# Patient Record
Sex: FEMALE | Race: WHITE | NOT HISPANIC OR LATINO | Employment: OTHER | URBAN - METROPOLITAN AREA
[De-identification: names, ages, dates, MRNs, and addresses within clinical notes are randomized per-mention and may not be internally consistent; named-entity substitution may affect disease eponyms.]

---

## 2017-03-15 DIAGNOSIS — R63.5 ABNORMAL WEIGHT GAIN: ICD-10-CM

## 2017-03-15 DIAGNOSIS — R73.02 IMPAIRED GLUCOSE TOLERANCE: ICD-10-CM

## 2017-03-15 DIAGNOSIS — Z12.31 ENCOUNTER FOR SCREENING MAMMOGRAM FOR MALIGNANT NEOPLASM OF BREAST: ICD-10-CM

## 2017-03-17 ENCOUNTER — ALLSCRIPTS OFFICE VISIT (OUTPATIENT)
Dept: OTHER | Facility: OTHER | Age: 62
End: 2017-03-17

## 2017-04-10 ENCOUNTER — ALLSCRIPTS OFFICE VISIT (OUTPATIENT)
Dept: OTHER | Facility: OTHER | Age: 62
End: 2017-04-10

## 2017-12-07 ENCOUNTER — ALLSCRIPTS OFFICE VISIT (OUTPATIENT)
Dept: OTHER | Facility: OTHER | Age: 62
End: 2017-12-07

## 2017-12-08 NOTE — PROGRESS NOTES
Assessment    1  Former smoker (V15 82) (Q26 777)   2  Benign paroxysmal vertigo, unspecified laterality (386 11) (H81 10)   3  Benign essential hypertension (401 1) (I10)    Plan  Benign essential hypertension    · HydroCHLOROthiazide 12 5 MG Oral Tablet; TAKE 1 TABLET DAILY   · Lisinopril 5 MG Oral Tablet; TAKE 1 TABLET BY MOUTH ONCE DAILY   · Meclizine HCl - 25 MG Oral Tablet; TAKE 1 TABLET 3 times daily   · EKG/ECG- POC; Status:Active - Perform Order; Requested for:[id number];   Need for influenza vaccination    · Stop: Fluzone Quadrivalent 0 5 ML Intramuscular Suspension PrefilledSyringe    Discussion/Summary    Patient has considerable stress due to family diagreements BP up due to stress  Chief Complaint  Two weeks ago patient had dental procedures done which caused considerable pain she has now reached a point where she no longer has dental pain but she has been feeling a little bit dizzy and a little bit nauseous for the past 48 hours today when she got up out of bed she felt extremely vertiginous she also had nausea and she had some vague chest discomfort in the subxiphoid area of her chest no dyspnea patient also is acutely vertiginous here in the office when she changed positions after having her cardiogram done she became vertiginous      History of Present Illness  HPI: see chief complaint      Review of Systems   Constitutional: feeling poorly-- and-- feeling tired, but-- no fever-- and-- no chills  ENT: severe vertigo  Cardiovascular: no complaints of slow or fast heart rate, no chest pain, no palpitations, no leg claudication or lower extremity edema  Respiratory: no complaints of shortness of breath, no wheezing, no dyspnea on exertion, no orthopnea or PND  Breasts: no complaints of breast pain, breast lump or nipple discharge  Gastrointestinal: no complaints of abdominal pain, no constipation, no nausea or diarrhea, no vomiting, no bloody stools    Genitourinary: no complaints of dysuria, no incontinence, no pelvic pain, no dysmenorrhea, no vaginal discharge or abnormal vaginal bleeding  Musculoskeletal: no complaints of arthralgia, no myalgia, no joint swelling or stiffness, no limb pain or swelling  Integumentary: no complaints of skin rash or lesion, no itching or dry skin, no skin wounds  Neurological: dizziness  ROS reviewed  Active Problems    1  Abnormal weight gain (783 1) (R63 5)   2  Bell's palsy (351 0) (G51 0)   3  Benign paroxysmal vertigo, unspecified laterality (386 11) (H81 10)   4  Encounter for screening mammogram for breast cancer (V76 12) (Z12 31)   5  Gastroesophageal reflux disease, esophagitis presence not specified (530 81) (K21 9)   6  Glucose intolerance (impaired glucose tolerance) (790 22) (R73 02)   7  Need for influenza vaccination (V04 81) (Z23)   8  Screening for colorectal cancer (V76 51) (Z12 11,Z12 12)   9  Screening for depression (V79 0) (Z13 89)    Past Medical History  1  History of Chronic maxillary sinusitis (473 0) (J32 0)   2  History of Chronic pansinusitis (473 8) (J32 4)   3  History of Community acquired pneumonia (5) (J18 9)   4  History of Exposure To Epidemic Keratoconjunctivitis (V01 79)   5  History of common cold (V12 09) (Z86 19)   6  History of common cold (V12 09) (Z86 19)   7  History of urinary tract infection (V13 02) (Z87 440)  Active Problems And Past Medical History Reviewed: The active problems and past medical history were reviewed and updated today  Family History  Mother    1  Family history of Heart disease (429 9) (I51 9)   2  Family history of Thyroid disorder (246 9) (E07 9)  Family History Reviewed: The family history was reviewed and updated today  Social History   · Former smoker (B03 56) (I22 813)   · Social drinker  The social history was reviewed and updated today  The social history was reviewed and is unchanged  Surgical History    1  History of Gallbladder Surgery   2   History of Laparoscopy (Diagnostic)   3  History of Tonsillectomy   4  History of Uterine Surgery  Surgical History Reviewed: The surgical history was reviewed and updated today  Current Meds   1  Chlorophyll TABS; TAKE 2 TABLET  with each meal as needed; Therapy: (Recorded:17Mar2017) to Recorded    The medication list was reviewed and updated today  Allergies  1  Cipro TABS   2  TraMADol HCl TABS    Vitals   Recorded: 59STC9794 52:68CA   Systolic 020   Diastolic 139   Height 5 ft 2 in   Weight 185 lb 3 2 oz   BMI Calculated 33 87   BSA Calculated 1 85       Physical Exam   Constitutional  General appearance: No acute distress, well appearing and well nourished  Eyes  Conjunctiva and lids: No swelling, erythema or discharge  Pupils and irises: Equal, round and reactive to light  Ears, Nose, Mouth, and Throat  External inspection of ears and nose: Normal    Otoscopic examination: Tympanic membranes translucent with normal light reflex  Canals patent without erythema  Nasal mucosa, septum, and turbinates: Normal without edema or erythema  Oropharynx: Normal with no erythema, edema, exudate or lesions  Pulmonary  Respiratory effort: No increased work of breathing or signs of respiratory distress  Auscultation of lungs: Clear to auscultation  Cardiovascular  Palpation of heart: Normal PMI, no thrills  Auscultation of heart: Normal rate and rhythm, normal S1 and S2, without murmurs  Examination of extremities for edema and/or varicosities: Normal    Carotid pulses: Normal    Abdomen  Abdomen: Non-tender, no masses  Liver and spleen: No hepatomegaly or splenomegaly  Lymphatic  Palpation of lymph nodes in neck: No lymphadenopathy  Musculoskeletal  Gait and station: Normal    Digits and nails: Normal without clubbing or cyanosis  Inspection/palpation of joints, bones, and muscles: Normal    Skin  Skin and subcutaneous tissue: Normal without rashes or lesions     Neurologic  Cranial nerves: Cranial nerves 2-12 intact  Reflexes: 2+ and symmetric  Sensation: No sensory loss     Psychiatric  Orientation to person, place, and time: Normal    Mood and affect: Normal          Signatures   Electronically signed by : Supriya Anderson DO; Dec  7 2017  1:56PM EST                       (Author)

## 2017-12-09 ENCOUNTER — HOSPITAL ENCOUNTER (EMERGENCY)
Facility: HOSPITAL | Age: 62
Discharge: HOME | End: 2017-12-09
Attending: EMERGENCY MEDICINE

## 2017-12-09 ENCOUNTER — APPOINTMENT (EMERGENCY)
Dept: RADIOLOGY | Facility: HOSPITAL | Age: 62
End: 2017-12-09

## 2017-12-09 VITALS
OXYGEN SATURATION: 98 % | HEART RATE: 82 BPM | RESPIRATION RATE: 18 BRPM | SYSTOLIC BLOOD PRESSURE: 124 MMHG | WEIGHT: 180.5 LBS | DIASTOLIC BLOOD PRESSURE: 78 MMHG | TEMPERATURE: 98 F | BODY MASS INDEX: 33.01 KG/M2

## 2017-12-09 DIAGNOSIS — I10 HYPERTENSION COMPLICATIONS: Primary | ICD-10-CM

## 2017-12-09 DIAGNOSIS — R42 EPISODIC LIGHTHEADEDNESS: ICD-10-CM

## 2017-12-09 LAB
ALBUMIN SERPL BCP-MCNC: 4 G/DL (ref 3.5–5)
ALP SERPL-CCNC: 96 U/L (ref 46–116)
ALT SERPL W P-5'-P-CCNC: 58 U/L (ref 12–78)
ANION GAP SERPL CALCULATED.3IONS-SCNC: 10 MMOL/L (ref 4–13)
AST SERPL W P-5'-P-CCNC: 29 U/L (ref 5–45)
BASOPHILS # BLD AUTO: 0.08 THOUSANDS/ΜL (ref 0–0.1)
BASOPHILS NFR BLD AUTO: 1 % (ref 0–1)
BILIRUB SERPL-MCNC: 0.4 MG/DL (ref 0.2–1)
BUN SERPL-MCNC: 20 MG/DL (ref 5–25)
CALCIUM SERPL-MCNC: 9.5 MG/DL (ref 8.3–10.1)
CHLORIDE SERPL-SCNC: 103 MMOL/L (ref 100–108)
CO2 SERPL-SCNC: 28 MMOL/L (ref 21–32)
CREAT SERPL-MCNC: 0.82 MG/DL (ref 0.6–1.3)
DEPRECATED D DIMER PPP: <270 NG/ML (FEU) (ref 0–424)
EOSINOPHIL # BLD AUTO: 0.3 THOUSAND/ΜL (ref 0–0.61)
EOSINOPHIL NFR BLD AUTO: 3 % (ref 0–6)
ERYTHROCYTE [DISTWIDTH] IN BLOOD BY AUTOMATED COUNT: 11.8 % (ref 11.6–15.1)
GFR SERPL CREATININE-BSD FRML MDRD: 77 ML/MIN/1.73SQ M
GLUCOSE SERPL-MCNC: 118 MG/DL (ref 65–140)
HCT VFR BLD AUTO: 49.7 % (ref 34.8–46.1)
HGB BLD-MCNC: 17 G/DL (ref 11.5–15.4)
LYMPHOCYTES # BLD AUTO: 3.04 THOUSANDS/ΜL (ref 0.6–4.47)
LYMPHOCYTES NFR BLD AUTO: 33 % (ref 14–44)
MCH RBC QN AUTO: 31.1 PG (ref 26.8–34.3)
MCHC RBC AUTO-ENTMCNC: 34.2 G/DL (ref 31.4–37.4)
MCV RBC AUTO: 91 FL (ref 82–98)
MONOCYTES # BLD AUTO: 0.99 THOUSAND/ΜL (ref 0.17–1.22)
MONOCYTES NFR BLD AUTO: 11 % (ref 4–12)
NEUTROPHILS # BLD AUTO: 4.71 THOUSANDS/ΜL (ref 1.85–7.62)
NEUTS SEG NFR BLD AUTO: 52 % (ref 43–75)
PLATELET # BLD AUTO: 321 THOUSANDS/UL (ref 149–390)
PMV BLD AUTO: 10.3 FL (ref 8.9–12.7)
POTASSIUM SERPL-SCNC: 4.4 MMOL/L (ref 3.5–5.3)
PROT SERPL-MCNC: 8.6 G/DL (ref 6.4–8.2)
RBC # BLD AUTO: 5.47 MILLION/UL (ref 3.81–5.12)
SODIUM SERPL-SCNC: 141 MMOL/L (ref 136–145)
TROPONIN I SERPL-MCNC: <0.02 NG/ML
WBC # BLD AUTO: 9.12 THOUSAND/UL (ref 4.31–10.16)

## 2017-12-09 PROCEDURE — 93005 ELECTROCARDIOGRAM TRACING: CPT | Performed by: EMERGENCY MEDICINE

## 2017-12-09 PROCEDURE — 85025 COMPLETE CBC W/AUTO DIFF WBC: CPT | Performed by: EMERGENCY MEDICINE

## 2017-12-09 PROCEDURE — 80053 COMPREHEN METABOLIC PANEL: CPT | Performed by: EMERGENCY MEDICINE

## 2017-12-09 PROCEDURE — 85379 FIBRIN DEGRADATION QUANT: CPT | Performed by: EMERGENCY MEDICINE

## 2017-12-09 PROCEDURE — 71020 HB CHEST X-RAY 2VW FRONTAL&LATL: CPT

## 2017-12-09 PROCEDURE — 99285 EMERGENCY DEPT VISIT HI MDM: CPT

## 2017-12-09 PROCEDURE — 93005 ELECTROCARDIOGRAM TRACING: CPT

## 2017-12-09 PROCEDURE — 36415 COLL VENOUS BLD VENIPUNCTURE: CPT | Performed by: EMERGENCY MEDICINE

## 2017-12-09 PROCEDURE — 84484 ASSAY OF TROPONIN QUANT: CPT | Performed by: EMERGENCY MEDICINE

## 2017-12-09 RX ORDER — ASPIRIN 81 MG/1
162 TABLET, CHEWABLE ORAL ONCE
Status: COMPLETED | OUTPATIENT
Start: 2017-12-09 | End: 2017-12-09

## 2017-12-09 RX ORDER — 0.9 % SODIUM CHLORIDE 0.9 %
3 VIAL (ML) INJECTION AS NEEDED
Status: DISCONTINUED | OUTPATIENT
Start: 2017-12-09 | End: 2017-12-09 | Stop reason: HOSPADM

## 2017-12-09 RX ORDER — HYDROCHLOROTHIAZIDE 12.5 MG/1
12.5 CAPSULE, GELATIN COATED ORAL DAILY
COMMUNITY
End: 2018-06-18 | Stop reason: SDUPTHER

## 2017-12-09 RX ADMIN — ASPIRIN 81 MG CHEWABLE TABLET 162 MG: 81 TABLET CHEWABLE at 12:24

## 2017-12-09 NOTE — ED PROCEDURE NOTE
PROCEDURE  ECG 12 Lead Documentation  Date/Time: 12/9/2017 12:07 PM  Performed by: Adriane Aguayo  Authorized by: Adriane Aguayo     Indications / Diagnosis:  Chest pain  ECG reviewed by me, the ED Provider: yes    Patient location:  ED  Interpretation:     Interpretation: non-specific    Quality:     Tracing quality:  Limited by artifact  Rate:     ECG rate:  95    ECG rate assessment: normal    Rhythm:     Rhythm: sinus rhythm    Ectopy:     Ectopy: none    Conduction:     Conduction: normal    ST segments:     ST segments:  Non-specific  T waves:     T waves: normal

## 2017-12-09 NOTE — DISCHARGE INSTRUCTIONS

## 2017-12-09 NOTE — ED PROVIDER NOTES
History  Chief Complaint   Patient presents with    Chest Pain     feels chest fluttering today     Patient presents for complaint of lightheadedness, twinges of left-sided chest pain, nausea and episodes of occipital headache occurring for the past week  She saw her PCP 2 days ago and was newly diagnosed with hypertension for which she was given lisinopril and HCTZ  After 1 dose of the lisinopril she became dizzy/lightheaded, had micturition near syncope and was unable to tolerate further dosing  In consultation with her PCP she stopped the lisinopril and continues the HCTZ  She states her blood pressure was 180/101 2 days ago prior to starting medications  Prior to that she indicates that her pressure was always in the 130s over 80s  She states she has a prior h/o vertigo like symptoms but has never taken medication for it  She denies other medical conditions or medications  She denies prior h/o cardiac stress testing  Today she states she got diaphoretic and a fluttering sensation in her chest while shopping and this made her more nervous  In the ED she has no chest pain or associated cardiac symptoms  No recent travel or sick contacts  Denies f/c, HA, LH/dizziness, diaphoresis, SOB, abdominal pain, n/v/d  12 system ROS o/w negative                History provided by:  Medical records and patient  Chest Pain   Pain location:  L chest  Pain quality: sharp    Pain radiates to:  Does not radiate  Pain radiates to the back: no    Pain severity:  Mild  Onset quality:  Sudden  Duration:  3 days  Timing:  Sporadic  Progression:  Waxing and waning  Chronicity:  New  Context: at rest    Context: not breathing, not eating, not lifting, no movement, not raising an arm, no stress and no trauma    Relieved by:  None tried  Worsened by:  Nothing tried  Ineffective treatments:  None tried  Associated symptoms: anxiety, diaphoresis, headache (Occipital), nausea and near-syncope    Associated symptoms: no abdominal pain, no altered mental status, no back pain, no claudication, no cough, no dizziness, no fatigue, no fever, no lower extremity edema, no numbness, no palpitations, no shortness of breath, no syncope, not vomiting and no weakness    Nausea:     Severity:  Mild    Onset quality:  Unable to specify    Timing:  Sporadic    Progression:  Resolved  Risk factors: hypertension and obesity    Risk factors: no coronary artery disease, no diabetes mellitus, no high cholesterol, no immobilization, not male, not pregnant, no prior DVT/PE, no smoking and no surgery        Prior to Admission Medications   Prescriptions Last Dose Informant Patient Reported? Taking?   hydrochlorothiazide (MICROZIDE) 12 5 mg capsule   Yes Yes   Sig: Take 12 5 mg by mouth daily      Facility-Administered Medications: None       Past Medical History:   Diagnosis Date    Bell's palsy     Hypertension     Kidney calculi        Past Surgical History:   Procedure Laterality Date    CHOLECYSTECTOMY      ENDOMETRIAL ABLATION         History reviewed  No pertinent family history  I have reviewed and agree with the history as documented  Social History   Substance Use Topics    Smoking status: Never Smoker    Smokeless tobacco: Never Used    Alcohol use No        Review of Systems   Constitutional: Positive for diaphoresis  Negative for activity change, appetite change, chills, fatigue and fever  HENT: Negative for rhinorrhea and sore throat  Respiratory: Negative for cough, shortness of breath and wheezing  Cardiovascular: Positive for chest pain and near-syncope  Negative for palpitations, claudication, leg swelling and syncope  Gastrointestinal: Positive for nausea  Negative for abdominal distention, abdominal pain, constipation, diarrhea and vomiting  Genitourinary: Negative for difficulty urinating, dysuria, flank pain, frequency, hematuria and urgency  Musculoskeletal: Negative for back pain and neck pain     Skin: Negative for pallor and rash  Neurological: Positive for headaches (Occipital)  Negative for dizziness, syncope, weakness, light-headedness and numbness  Hematological: Negative for adenopathy  Psychiatric/Behavioral: Negative for confusion  The patient is nervous/anxious  All other systems reviewed and are negative  Physical Exam  ED Triage Vitals [12/09/17 1209]   Temperature Pulse Respirations Blood Pressure SpO2   98 °F (36 7 °C) 98 18 (!) 176/123 99 %      Temp Source Heart Rate Source Patient Position - Orthostatic VS BP Location FiO2 (%)   Temporal Monitor Lying Left arm --      Pain Score       No Pain           Orthostatic Vital Signs  Vitals:    12/09/17 1209 12/09/17 1226 12/09/17 1245   BP: (!) 176/123 148/86 131/80   Pulse: 98 92 87   Patient Position - Orthostatic VS: Lying Sitting Sitting       Physical Exam   Constitutional: She is oriented to person, place, and time  She appears well-developed and well-nourished  No distress  HENT:   Head: Normocephalic  Right Ear: External ear normal    Left Ear: External ear normal    Mouth/Throat: Oropharynx is clear and moist    Eyes: Conjunctivae and EOM are normal  Pupils are equal, round, and reactive to light  Normal funduscopic exam   Neck: Normal range of motion  Neck supple  Cardiovascular: Normal rate and regular rhythm  No murmur heard  Pulmonary/Chest: Effort normal and breath sounds normal    Abdominal: Soft  Bowel sounds are normal  She exhibits no distension  There is no tenderness  Obese   Musculoskeletal: Normal range of motion  She exhibits no edema or tenderness  Lymphadenopathy:     She has no cervical adenopathy  Neurological: She is alert and oriented to person, place, and time  She has normal reflexes  No cranial nerve deficit or sensory deficit  She exhibits normal muscle tone  Skin: Skin is warm and dry  Capillary refill takes less than 2 seconds  No rash noted  She is not diaphoretic  No erythema  No pallor  Psychiatric: Her behavior is normal  Thought content normal    Anxious   Vitals reviewed  ED Medications  Medications   sodium chloride (PF) 0 9 % injection 3 mL (not administered)   aspirin chewable tablet 162 mg (162 mg Oral Given 12/9/17 1224)       Diagnostic Studies  Results Reviewed     Procedure Component Value Units Date/Time    D-dimer, quantitative [[id number]]  (Normal) Resulted:  12/09/17 1256    Lab Status:  Final result Specimen:  Blood Updated:  12/09/17 1256     D-Dimer, Quant <270 ng/ml (FEU)     Troponin I [[id number]]  (Normal) Collected:  12/09/17 1210    Lab Status:  Final result Specimen:  Blood from Arm, Right Updated:  12/09/17 1243     Troponin I <0 02 ng/mL     Narrative:         [employer] Chemistry analyzer 99% cutoff is > 0 04 ng/mL in network labs    o cTnI 99% cutoff is useful only when applied to patients in the clinical setting of myocardial ischemia  o cTnI 99% cutoff should be interpreted in the context of clinical history, ECG findings and possibly cardiac imaging to establish correct diagnosis  o cTnI 99% cutoff may be suggestive but clearly not indicative of a coronary event without the clinical setting of myocardial ischemia      Comprehensive metabolic panel [[id number]]  (Abnormal) Collected:  12/09/17 1210    Lab Status:  Final result Specimen:  Blood from Arm, Right Updated:  12/09/17 1238     Sodium 141 mmol/L      Potassium 4 4 mmol/L      Chloride 103 mmol/L      CO2 28 mmol/L      Anion Gap 10 mmol/L      BUN 20 mg/dL      Creatinine 0 82 mg/dL      Glucose 118 mg/dL      Calcium 9 5 mg/dL      AST 29 U/L      ALT 58 U/L      Alkaline Phosphatase 96 U/L      Total Protein 8 6 (H) g/dL      Albumin 4 0 g/dL      Total Bilirubin 0 40 mg/dL      eGFR 77 ml/min/1 73sq m     Narrative:         National Kidney Disease Education Program recommendations are as follows:  GFR calculation is accurate only with a steady state creatinine  Chronic Kidney disease less than 60 ml/min/1 73 sq  meters  Kidney failure less than 15 ml/min/1 73 sq  meters  CBC and differential [[id number]]  (Abnormal) Collected:  12/09/17 1210    Lab Status:  Final result Specimen:  Blood from Arm, Right Updated:  12/09/17 1224     WBC 9 12 Thousand/uL      RBC 5 47 (H) Million/uL      Hemoglobin 17 0 (H) g/dL      Hematocrit 49 7 (H) %      MCV 91 fL      MCH 31 1 pg      MCHC 34 2 g/dL      RDW 11 8 %      MPV 10 3 fL      Platelets 039 Thousands/uL      Neutrophils Relative 52 %      Lymphocytes Relative 33 %      Monocytes Relative 11 %      Eosinophils Relative 3 %      Basophils Relative 1 %      Neutrophils Absolute 4 71 Thousands/µL      Lymphocytes Absolute 3 04 Thousands/µL      Monocytes Absolute 0 99 Thousand/µL      Eosinophils Absolute 0 30 Thousand/µL      Basophils Absolute 0 08 Thousands/µL                  X-ray chest 2 views   ED Interpretation by Ronnald Collet, DO (12/09 1241)   No acute cardiopulmonary pathology      Final Result by Claudine Pacheco DO (12/09 1302)      No active pulmonary disease  Workstation performed: [id number]                    Procedures  Procedures       Phone Contacts  ED Phone Contact    ED Course  ED Course as of Dec 09 1305   Sat Dec 09, 2017   1241 Blood pressure much improved spontaneously with decreased anxiety  1302 Results reviewed with patient  Feels better  Recommend continued treatment at home and follow up with PCP as scheduled in 4 days            HEART Risk Score    Flowsheet Row Most Recent Value   History  0 Filed at: 12/09/2017 1219   ECG  1 Filed at: 12/09/2017 1219   Age  1 Filed at: 12/09/2017 1219   Risk Factors  1 Filed at: 12/09/2017 1219   Troponin  0 Filed at: 12/09/2017 1219   Heart Score Risk Calculator   History  0 Filed at: 12/09/2017 1219   ECG  1 Filed at: 12/09/2017 1219   Age  1 Filed at: 12/09/2017 1219   Risk Factors  1 Filed at: 12/09/2017 1219   Troponin  0 Filed at: 12/09/2017 1219   HEART Score  3 Filed at: 12/09/2017 1219   HEART Score  3 Filed at: 12/09/2017 1219                            [facility]  Number of Diagnoses or Management Options  Diagnosis management comments: DDx: Chest pain - uncontrolled hypertension, GERD, less likely ACS/MI, doubt pneumonia, pneumothorax, PE  A/P: Will check cardiac w/u, treat symptoms, reevaluate for disposition  Amount and/or Complexity of Data Reviewed  Clinical lab tests: reviewed and ordered  Tests in the radiology section of CPT®: ordered and reviewed  Obtain history from someone other than the patient: yes ()  Review and summarize past medical records: yes      CritCare Time    Disposition  Final diagnoses:   Hypertension complications   Episodic lightheadedness     Time reflects when diagnosis was documented in both MDM as applicable and the Disposition within this note     Time User Action Codes Description Comment    12/9/2017  1:03 PM [address]  [address] Hypertension complications     01/4/2288  1:04 PM [address],[place]  [address], [address] [R42] Intermittent lightheadedness     12/9/2017  1:04 PM Mavis Carrillo Modify [R42] Intermittent lightheadedness     12/9/2017  1:04 PM [address],[place]  [address], Jordin Remove [I10] Hypertension complications     66/4/2205  1:04 PM Mavis Gerardo Remove [R42] Intermittent lightheadedness     12/9/2017  1:04 PM [address],[place]  [address], [facility] Hypertension complications     32/7/3998  1:04 PM Mavis Gerardo Add [R42] Episodic lightheadedness       ED Disposition     ED Disposition Condition Comment    Discharge  [facility] discharge to home/self care  Condition at discharge: Stable        Follow-up Information     Follow up With Specialties Details Why Contact Claire Hernandez DO Family Medicine Go on 12/14/2017 As scheduled [address]  [phone]          Patient's Medications   Discharge Prescriptions    No medications on file     No discharge procedures on file      ED Provider  Electronically Signed by           Santana Rodriguez DO  12/09/17 1305

## 2017-12-11 LAB
ATRIAL RATE: 95 BPM
P AXIS: 40 DEGREES
PR INTERVAL: 158 MS
QRS AXIS: 29 DEGREES
QRSD INTERVAL: 78 MS
QT INTERVAL: 338 MS
QTC INTERVAL: 424 MS
T WAVE AXIS: 22 DEGREES
VENTRICULAR RATE: 95 BPM

## 2017-12-14 ENCOUNTER — GENERIC CONVERSION - ENCOUNTER (OUTPATIENT)
Dept: OTHER | Facility: OTHER | Age: 62
End: 2017-12-14

## 2018-01-12 VITALS
SYSTOLIC BLOOD PRESSURE: 138 MMHG | WEIGHT: 184.4 LBS | BODY MASS INDEX: 33.93 KG/M2 | HEIGHT: 62 IN | DIASTOLIC BLOOD PRESSURE: 92 MMHG

## 2018-01-12 VITALS
BODY MASS INDEX: 33.86 KG/M2 | DIASTOLIC BLOOD PRESSURE: 88 MMHG | HEIGHT: 62 IN | SYSTOLIC BLOOD PRESSURE: 124 MMHG | WEIGHT: 184 LBS | TEMPERATURE: 98.1 F

## 2018-01-15 NOTE — PROGRESS NOTES
Assessment    1  Bell's palsy (351 0) (G51 0)    Plan  Bell's palsy    · Acyclovir 800 MG Oral Tablet; one po tid x 10 days   · PredniSONE 20 MG Oral Tablet; one po tid x 3 days, bid x 3 days, qd x 3 days    Discussion/Summary    Bell's Palsy:   - Acyclovir 800 tid x 10 days  - Prednisone as prescribed  - Lacrilube eye drops q1 hour while awake  - avoid contact lenses, discussed eye rest    The patient was counseled regarding diagnostic results, instructions for management, risk factor reductions, prognosis, patient and family education, impressions, risks and benefits of treatment options, importance of compliance with treatment  Possible side effects of new medications were reviewed with the patient/guardian today  The treatment plan was reviewed with the patient/guardian  The patient/guardian understands and agrees with the treatment plan      Chief Complaint    1  Facial Pain    History of Present Illness  Syed Palsy (Brief): The patient is being seen for an initial evaluation of Bell palsy  Symptoms:  asymmetric facial paresis and asymmetric facial paralysis, but no increased tearing and no facial numbness    The patient presents with complaints of sudden onset of constant episodes of moderate difficulty closing eyelid  Episodes started about 1 day ago  The patient is currently experiencing symptoms  Associated symptoms:  ear pain and facial pain, but no facial rash and no facial vesicles  The patient is not currently being treated for this problem  Pertinent medical history:  prior Bell palsy  This problem has not been previously evaluated  Review of Systems    Constitutional: No fever, no chills, feels well, no tiredness, no recent weight gain or loss  ENT: no ear ache, no loss of hearing, no nosebleeds or nasal discharge, no sore throat or hoarseness  Cardiovascular: no complaints of slow or fast heart rate, no chest pain, no palpitations, no leg claudication or lower extremity edema  Respiratory: no complaints of shortness of breath, no wheezing, no dyspnea on exertion, no orthopnea or PND  Breasts: no complaints of breast pain, breast lump or nipple discharge  Gastrointestinal: no complaints of abdominal pain, no constipation, no nausea or diarrhea, no vomiting, no bloody stools  Genitourinary: no complaints of dysuria, no incontinence, no pelvic pain, no dysmenorrhea, no vaginal discharge or abnormal vaginal bleeding  Musculoskeletal: no complaints of arthralgia, no myalgia, no joint swelling or stiffness, no limb pain or swelling  Integumentary: no complaints of skin rash or lesion, no itching or dry skin, no skin wounds  Neurological: no complaints of headache, no confusion, no numbness or tingling, no dizziness or fainting  Active Problems    1  Exposure To Epidemic Keratoconjunctivitis (V01 79)    Past Medical History    1  History of common cold (V12 09) (Z86 19)   2  History of common cold (V12 09) (Z86 19)  Active Problems And Past Medical History Reviewed: The active problems and past medical history were reviewed and updated today  Family History    1  Family history of Heart disease (429 9) (I51 9)   2  Family history of Thyroid disorder (246 9) (E07 9)  Family History Reviewed: The family history was reviewed and updated today  Social History    · Former smoker (G76 68) (C41 236)   · Social drinker  The social history was reviewed and updated today  The social history was reviewed and is unchanged  Surgical History    1  History of Gallbladder Surgery   2  History of Laparoscopy (Diagnostic)   3  History of Tonsillectomy   4  History of Uterine Surgery  Surgical History Reviewed: The surgical history was reviewed and updated today  Current Meds   1  No Reported Medications Recorded    The medication list was reviewed and updated today  Allergies    1   No Known Drug Allergies    Vitals   Recorded: [id number] 11:14AM   Heart Rate 79 Systolic 038, LUE, Sitting   Diastolic 98, LUE, Sitting   Height 5 ft 2 in   Weight 185 lb 2 08 oz   BMI Calculated 33 86   BSA Calculated 1 85     Physical Exam    Constitutional   General appearance: No acute distress, well appearing and well nourished  Eyes   Conjunctiva and lids: No swelling, erythema or discharge  Pupils and irises: Equal, round and reactive to light  Ears, Nose, Mouth, and Throat   External inspection of ears and nose: Normal     Otoscopic examination: Tympanic membranes translucent with normal light reflex  Canals patent without erythema  Nasal mucosa, septum, and turbinates: Normal without edema or erythema  Oropharynx: Normal with no erythema, edema, exudate or lesions  Pulmonary   Respiratory effort: No increased work of breathing or signs of respiratory distress  Auscultation of lungs: Clear to auscultation  Cardiovascular   Auscultation of heart: Normal rate and rhythm, normal S1 and S2, without murmurs  Examination of extremities for edema and/or varicosities: Normal     Abdomen   Abdomen: Non-tender, no masses  Liver and spleen: No hepatomegaly or splenomegaly  Lymphatic   Palpation of lymph nodes in neck: No lymphadenopathy  Musculoskeletal   Gait and station: Normal     Digits and nails: Normal without clubbing or cyanosis  Inspection/palpation of joints, bones, and muscles: Abnormal   right sided facial droop involving the right forehead, unable to close right eye c/w bell's palsy  Skin   Skin and subcutaneous tissue: Normal without rashes or lesions  Neurologic   Cranial nerves: Cranial nerves 2-12 intact  no pronator drift, no hand or leg weakness  Reflexes: 2+ and symmetric  Sensation: No sensory loss      Psychiatric   Orientation to person, place, and time: Normal     Mood and affect: Normal          Signatures   Electronically signed by : TYRONE Coleman; Jan 25 2016 12:47PM EST                       (Author) Electronically signed by : Ananth Jacobson DO; Jan 25 2016  3:41PM EST                       (Author)

## 2018-01-23 VITALS
WEIGHT: 185.2 LBS | HEIGHT: 62 IN | BODY MASS INDEX: 34.08 KG/M2 | SYSTOLIC BLOOD PRESSURE: 162 MMHG | DIASTOLIC BLOOD PRESSURE: 100 MMHG

## 2018-01-24 VITALS
DIASTOLIC BLOOD PRESSURE: 86 MMHG | WEIGHT: 181 LBS | BODY MASS INDEX: 33.31 KG/M2 | HEIGHT: 62 IN | SYSTOLIC BLOOD PRESSURE: 128 MMHG

## 2018-06-18 DIAGNOSIS — I10 ESSENTIAL HYPERTENSION: Primary | ICD-10-CM

## 2018-06-18 RX ORDER — HYDROCHLOROTHIAZIDE 12.5 MG/1
12.5 CAPSULE, GELATIN COATED ORAL DAILY
Qty: 30 CAPSULE | Refills: 0 | Status: SHIPPED | OUTPATIENT
Start: 2018-06-18 | End: 2018-11-28 | Stop reason: ALTCHOICE

## 2018-08-27 ENCOUNTER — OFFICE VISIT (OUTPATIENT)
Dept: FAMILY MEDICINE CLINIC | Facility: CLINIC | Age: 63
End: 2018-08-27

## 2018-08-27 VITALS
HEIGHT: 62 IN | DIASTOLIC BLOOD PRESSURE: 68 MMHG | WEIGHT: 181.2 LBS | BODY MASS INDEX: 33.34 KG/M2 | SYSTOLIC BLOOD PRESSURE: 126 MMHG | TEMPERATURE: 97.8 F

## 2018-08-27 DIAGNOSIS — J32.9 SINUSITIS, UNSPECIFIED CHRONICITY, UNSPECIFIED LOCATION: ICD-10-CM

## 2018-08-27 DIAGNOSIS — J02.9 PHARYNGITIS, UNSPECIFIED ETIOLOGY: Primary | ICD-10-CM

## 2018-08-27 DIAGNOSIS — J40 BRONCHITIS: ICD-10-CM

## 2018-08-27 PROBLEM — R73.02 GLUCOSE INTOLERANCE (IMPAIRED GLUCOSE TOLERANCE): Status: ACTIVE | Noted: 2017-03-17

## 2018-08-27 PROBLEM — I10 BENIGN ESSENTIAL HYPERTENSION: Status: ACTIVE | Noted: 2017-12-07

## 2018-08-27 PROBLEM — K21.9 GASTROESOPHAGEAL REFLUX DISEASE: Status: ACTIVE | Noted: 2017-03-17

## 2018-08-27 PROCEDURE — 99212 OFFICE O/P EST SF 10 MIN: CPT | Performed by: PHYSICIAN ASSISTANT

## 2018-08-27 RX ORDER — AMOXICILLIN 500 MG/1
500 CAPSULE ORAL EVERY 8 HOURS SCHEDULED
Qty: 30 CAPSULE | Refills: 0 | Status: SHIPPED | OUTPATIENT
Start: 2018-08-27 | End: 2018-09-06

## 2018-08-27 NOTE — PROGRESS NOTES
Assessment/Plan:         Diagnoses and all orders for this visit:    Pharyngitis, unspecified etiology  -     amoxicillin (AMOXIL) 500 mg capsule; Take 1 capsule (500 mg total) by mouth every 8 (eight) hours for 10 days    Sinusitis, unspecified chronicity, unspecified location  -     amoxicillin (AMOXIL) 500 mg capsule; Take 1 capsule (500 mg total) by mouth every 8 (eight) hours for 10 days    Bronchitis  -     amoxicillin (AMOXIL) 500 mg capsule; Take 1 capsule (500 mg total) by mouth every 8 (eight) hours for 10 days          Subjective:      Patient ID: Tyler Horner is a 61 y o  female  Earache    There is pain in both ears  This is a new problem  The current episode started 1 to 4 weeks ago  The problem occurs constantly  The problem has been unchanged  There has been no fever  The pain is mild  Associated symptoms include coughing and a sore throat  Pertinent negatives include no abdominal pain, diarrhea, ear discharge, headaches, rash, rhinorrhea or vomiting  She has tried NSAIDs for the symptoms  The treatment provided mild relief  Sore Throat    This is a new problem  The current episode started 1 to 4 weeks ago  The problem has been unchanged  There has been no fever  Associated symptoms include coughing, ear pain and swollen glands  Pertinent negatives include no abdominal pain, diarrhea, ear discharge, headaches, hoarse voice, shortness of breath, trouble swallowing or vomiting  She has tried NSAIDs for the symptoms  The treatment provided mild relief  The following portions of the patient's history were reviewed and updated as appropriate:   She  has a past medical history of Bell's palsy; Hypertension; and Kidney calculi    She   Patient Active Problem List    Diagnosis Date Noted    Benign essential hypertension 12/07/2017    Glucose intolerance (impaired glucose tolerance) 03/17/2017    Gastroesophageal reflux disease 03/17/2017    Benign paroxysmal vertigo 03/08/2016    Bell's palsy 01/25/2016     She  has a past surgical history that includes Cholecystectomy and Endometrial ablation  Her family history is not on file  She  reports that she has never smoked  She has never used smokeless tobacco  She reports that she does not drink alcohol or use drugs  Current Outpatient Prescriptions   Medication Sig Dispense Refill    amoxicillin (AMOXIL) 500 mg capsule Take 1 capsule (500 mg total) by mouth every 8 (eight) hours for 10 days 30 capsule 0    hydrochlorothiazide (MICROZIDE) 12 5 mg capsule Take 1 capsule (12 5 mg total) by mouth daily (Patient not taking: Reported on 8/27/2018 ) 30 capsule 0     No current facility-administered medications for this visit  Current Outpatient Prescriptions on File Prior to Visit   Medication Sig    hydrochlorothiazide (MICROZIDE) 12 5 mg capsule Take 1 capsule (12 5 mg total) by mouth daily (Patient not taking: Reported on 8/27/2018 )     No current facility-administered medications on file prior to visit  She is allergic to ciprofloxacin; lisinopril; prednisone; and tramadol       Review of Systems   Constitutional: Negative for chills, fatigue and fever  HENT: Positive for ear pain and sore throat  Negative for ear discharge, hoarse voice, postnasal drip, rhinorrhea, sinus pressure and trouble swallowing  Eyes: Negative for pain  Respiratory: Positive for cough  Negative for shortness of breath and wheezing  Cardiovascular: Negative for chest pain, palpitations and leg swelling  Gastrointestinal: Negative for abdominal pain, constipation, diarrhea and vomiting  Genitourinary: Negative for dysuria, flank pain and hematuria  Musculoskeletal: Negative for arthralgias, gait problem and joint swelling  Skin: Negative for rash  Neurological: Negative for dizziness, light-headedness and headaches  Psychiatric/Behavioral: Negative for agitation  The patient is not nervous/anxious and is not hyperactive  Objective:      /68   Temp 97 8 °F (36 6 °C)   Ht 5' 2" (1 575 m)   Wt 82 2 kg (181 lb 3 2 oz)   BMI 33 14 kg/m²          Physical Exam   Constitutional: She is oriented to person, place, and time  She appears well-developed and well-nourished  No distress  HENT:   Head: Normocephalic and atraumatic  Right Ear: Hearing, external ear and ear canal normal  Tympanic membrane is erythematous  Left Ear: External ear and ear canal normal  Tympanic membrane is erythematous  Mouth/Throat: Uvula is midline, oropharynx is clear and moist and mucous membranes are normal  No oropharyngeal exudate  Eyes: Conjunctivae are normal    Neck: Neck supple  Carotid bruit is not present  Cardiovascular: Normal rate, regular rhythm and normal heart sounds  Pulmonary/Chest: Effort normal  No respiratory distress  She has no wheezes  Coarse bs   Musculoskeletal: Normal range of motion  Lymphadenopathy:     She has cervical adenopathy  Neurological: She is alert and oriented to person, place, and time  Skin: Skin is warm  No rash noted  She is not diaphoretic  No erythema  Psychiatric: She has a normal mood and affect  Her behavior is normal  Judgment and thought content normal    Vitals reviewed

## 2018-10-26 ENCOUNTER — TELEPHONE (OUTPATIENT)
Dept: FAMILY MEDICINE CLINIC | Facility: CLINIC | Age: 63
End: 2018-10-26

## 2018-10-26 DIAGNOSIS — N30.00 ACUTE CYSTITIS WITHOUT HEMATURIA: Primary | ICD-10-CM

## 2018-10-26 RX ORDER — NITROFURANTOIN 25; 75 MG/1; MG/1
100 CAPSULE ORAL 2 TIMES DAILY
Qty: 14 CAPSULE | Refills: 0 | Status: SHIPPED | OUTPATIENT
Start: 2018-10-26 | End: 2018-11-02

## 2018-10-26 NOTE — TELEPHONE ENCOUNTER
She has a uti, started last night, now she has the chills and feels sick to her stomach  Uses [facility]

## 2018-11-16 DIAGNOSIS — Z12.31 ENCOUNTER FOR SCREENING MAMMOGRAM FOR BREAST CANCER: Primary | ICD-10-CM

## 2018-11-28 ENCOUNTER — OFFICE VISIT (OUTPATIENT)
Dept: FAMILY MEDICINE CLINIC | Facility: CLINIC | Age: 63
End: 2018-11-28

## 2018-11-28 VITALS
TEMPERATURE: 97.8 F | WEIGHT: 185 LBS | BODY MASS INDEX: 34.04 KG/M2 | SYSTOLIC BLOOD PRESSURE: 142 MMHG | DIASTOLIC BLOOD PRESSURE: 96 MMHG | HEIGHT: 62 IN

## 2018-11-28 DIAGNOSIS — J04.2 ACUTE LARYNGITIS AND TRACHEITIS: Primary | ICD-10-CM

## 2018-11-28 PROCEDURE — 99213 OFFICE O/P EST LOW 20 MIN: CPT | Performed by: FAMILY MEDICINE

## 2018-11-28 RX ORDER — MELATONIN
5000 DAILY
COMMUNITY

## 2018-11-28 RX ORDER — AZITHROMYCIN 250 MG/1
TABLET, FILM COATED ORAL
Qty: 6 TABLET | Refills: 0 | Status: SHIPPED | OUTPATIENT
Start: 2018-11-28 | End: 2018-12-02

## 2018-11-28 NOTE — PROGRESS NOTES
Assessment/Plan:  Patient will be given a Zithromax and mucolytic agent for her laryngitis    No problem-specific Assessment & Plan notes found for this encounter  Diagnoses and all orders for this visit:    Acute laryngitis and tracheitis    Other orders  -     cholecalciferol (VITAMIN D3) 1,000 units tablet; Take 2,000 Units by mouth daily  -     Ascorbic Acid (VITAMIN C PO); Take by mouth daily 2 gummies daily          Subjective:      Patient ID: Milli Taylor is a 61 y o  female  Patient here for upper respiratory tract infection with hoarseness pressure in her left ear sinus congestion cough expectoration of mucus no fever chills or signs of sepsis        The following portions of the patient's history were reviewed and updated as appropriate:   She  has a past medical history of Bell's palsy; Hypertension; and Kidney calculi  She   Patient Active Problem List    Diagnosis Date Noted    Benign essential hypertension 12/07/2017    Glucose intolerance (impaired glucose tolerance) 03/17/2017    Gastroesophageal reflux disease 03/17/2017    Benign paroxysmal vertigo 03/08/2016    Bell's palsy 01/25/2016     She  has a past surgical history that includes Cholecystectomy and Endometrial ablation  Her family history includes Dementia in her mother; Emphysema in her father; Heart disease in her mother  She  reports that she has never smoked  She has never used smokeless tobacco  She reports that she does not drink alcohol or use drugs  Current Outpatient Prescriptions   Medication Sig Dispense Refill    Ascorbic Acid (VITAMIN C PO) Take by mouth daily 2 gummies daily      cholecalciferol (VITAMIN D3) 1,000 units tablet Take 2,000 Units by mouth daily       No current facility-administered medications for this visit        Current Outpatient Prescriptions on File Prior to Visit   Medication Sig    [DISCONTINUED] hydrochlorothiazide (MICROZIDE) 12 5 mg capsule Take 1 capsule (12 5 mg total) by mouth daily     No current facility-administered medications on file prior to visit  She is allergic to ciprofloxacin; lisinopril; prednisone; and tramadol       Review of Systems   Constitutional: Negative for activity change, appetite change, diaphoresis, fatigue and fever  HENT: Positive for sinus pain, sinus pressure, sore throat and voice change  Eyes: Negative  Respiratory: Negative for apnea, cough, chest tightness, shortness of breath and wheezing  Cardiovascular: Negative for chest pain, palpitations and leg swelling  Gastrointestinal: Negative for abdominal distention, abdominal pain, anal bleeding, constipation, diarrhea, nausea and vomiting  Endocrine: Negative for cold intolerance, heat intolerance, polydipsia, polyphagia and polyuria  Genitourinary: Negative for difficulty urinating, dysuria, flank pain, hematuria and urgency  Musculoskeletal: Negative for arthralgias, back pain, gait problem, joint swelling and myalgias  Skin: Negative for color change, rash and wound  Allergic/Immunologic: Negative for environmental allergies, food allergies and immunocompromised state  Neurological: Negative for dizziness, seizures, syncope, speech difficulty, numbness and headaches  Hematological: Negative for adenopathy  Does not bruise/bleed easily  Psychiatric/Behavioral: Negative for agitation, behavioral problems, hallucinations, sleep disturbance and suicidal ideas  Objective:      /96 (BP Location: Left arm, Patient Position: Sitting, Cuff Size: Standard)   Temp 97 8 °F (36 6 °C) (Tympanic) Comment (Src): Left  Ht 5' 2" (1 575 m)   Wt 83 9 kg (185 lb)   BMI 33 84 kg/m²          Physical Exam   Constitutional: She is oriented to person, place, and time  She appears well-developed and well-nourished  No distress  HENT:   Head: Normocephalic     Right Ear: External ear normal    Left Ear: External ear normal    Nose: Nose normal    Mouth/Throat: Oropharyngeal exudate present  Eyes: Pupils are equal, round, and reactive to light  Conjunctivae and EOM are normal  Right eye exhibits no discharge  Left eye exhibits no discharge  No scleral icterus  Neck: Normal range of motion  No tracheal deviation present  No thyromegaly present  Cardiovascular: Normal rate, regular rhythm and normal heart sounds  Exam reveals no gallop and no friction rub  No murmur heard  Pulmonary/Chest: Effort normal and breath sounds normal  No respiratory distress  She has no wheezes  Abdominal: Soft  Bowel sounds are normal  She exhibits no mass  There is no tenderness  There is no guarding  Musculoskeletal: She exhibits no edema or deformity  Lymphadenopathy:     She has no cervical adenopathy  Neurological: She is alert and oriented to person, place, and time  No cranial nerve deficit  Skin: Skin is warm and dry  No rash noted  She is not diaphoretic  No erythema  Psychiatric: She has a normal mood and affect   Thought content normal

## 2019-02-01 ENCOUNTER — OFFICE VISIT (OUTPATIENT)
Dept: FAMILY MEDICINE CLINIC | Facility: CLINIC | Age: 64
End: 2019-02-01
Payer: COMMERCIAL

## 2019-02-01 VITALS
WEIGHT: 184.6 LBS | DIASTOLIC BLOOD PRESSURE: 82 MMHG | HEIGHT: 62 IN | BODY MASS INDEX: 33.97 KG/M2 | SYSTOLIC BLOOD PRESSURE: 142 MMHG

## 2019-02-01 DIAGNOSIS — E66.9 CLASS 1 OBESITY WITHOUT SERIOUS COMORBIDITY WITH BODY MASS INDEX (BMI) OF 31.0 TO 31.9 IN ADULT, UNSPECIFIED OBESITY TYPE: ICD-10-CM

## 2019-02-01 DIAGNOSIS — R73.02 GLUCOSE INTOLERANCE (IMPAIRED GLUCOSE TOLERANCE): ICD-10-CM

## 2019-02-01 DIAGNOSIS — Z12.31 ENCOUNTER FOR SCREENING MAMMOGRAM FOR BREAST CANCER: ICD-10-CM

## 2019-02-01 DIAGNOSIS — Z12.11 SCREEN FOR COLON CANCER: Primary | ICD-10-CM

## 2019-02-01 DIAGNOSIS — M48.062 SPINAL STENOSIS OF LUMBAR REGION WITH NEUROGENIC CLAUDICATION: ICD-10-CM

## 2019-02-01 PROCEDURE — 99213 OFFICE O/P EST LOW 20 MIN: CPT | Performed by: FAMILY MEDICINE

## 2019-02-01 PROCEDURE — 3008F BODY MASS INDEX DOCD: CPT | Performed by: FAMILY MEDICINE

## 2019-02-01 NOTE — PROGRESS NOTES
Assessment/Plan:    No problem-specific Assessment & Plan notes found for this encounter  Diagnoses and all orders for this visit:    Screen for colon cancer  -     Occult Bloood,Fecal Immunochemical; Future    Encounter for screening mammogram for breast cancer  -     Mammo screening bilateral w 3d & cad; Future    Spinal stenosis of lumbar region with neurogenic claudication  -     XR spine lumbar minimum 4 views non injury; Future  -     MRI lumbar spine wo contrast; Future          Subjective:      Patient ID: Anjelica Lock is a 61 y o  female  Patient here today with symptoms of of severe neurogenic claudication of both legs and severe sciatic pain in the right leg and numbness and anesthesia in the left leg patient was diagnosed over 20 years ago by a neurosurgeon with lumbar spinal stenosis and was advised to have surgery but she refused the surgery at that time she has been symptomatic all on a intermittent basis up until about a year ago symptoms have become daily they are limiting her ability to carry out her activities of daily living she can walk about 100 ft and till she has so much neurogenic claudication that she is unable to continue she cannot walk up even small slight grades and she has so much discomfort in the morning that her  massages her legs in order to allow her to get out of her bed and go to the restroom she is not having any on neurogenic bladder or bowel symptoms just has severe ambulatory dysfunction secondary to neurogenic claudication                                                                              secong issue is weight gain and elevated sugars because of inactivity  Family history of diabetes  Needs screening for diabetes        The following portions of the patient's history were reviewed and updated as appropriate:   She  has a past medical history of Bell's palsy; Hypertension; Kidney calculi; and Spinal stenosis    She   Patient Active Problem List Diagnosis Date Noted    Benign essential hypertension 12/07/2017    Glucose intolerance (impaired glucose tolerance) 03/17/2017    Gastroesophageal reflux disease 03/17/2017    Benign paroxysmal vertigo 03/08/2016    Bell's palsy 01/25/2016     She  has a past surgical history that includes Cholecystectomy and Endometrial ablation  Her family history includes Dementia in her mother; Emphysema in her father; Heart disease in her mother  She  reports that she has never smoked  She has never used smokeless tobacco  She reports that she does not drink alcohol or use drugs  Current Outpatient Prescriptions   Medication Sig Dispense Refill    Ascorbic Acid (VITAMIN C PO) Take by mouth daily 2 gummies daily      cholecalciferol (VITAMIN D3) 1,000 units tablet Take 2,000 Units by mouth daily       No current facility-administered medications for this visit  Current Outpatient Prescriptions on File Prior to Visit   Medication Sig    Ascorbic Acid (VITAMIN C PO) Take by mouth daily 2 gummies daily    cholecalciferol (VITAMIN D3) 1,000 units tablet Take 2,000 Units by mouth daily     No current facility-administered medications on file prior to visit  She is allergic to ciprofloxacin; lisinopril; prednisone; and tramadol       Review of Systems   Constitutional: Positive for activity change  Negative for appetite change, diaphoresis, fatigue and fever  HENT: Negative  Eyes: Negative  Respiratory: Negative for apnea, cough, chest tightness, shortness of breath and wheezing  Cardiovascular: Negative for chest pain, palpitations and leg swelling  Gastrointestinal: Negative for abdominal distention, abdominal pain, anal bleeding, constipation, diarrhea, nausea and vomiting  Endocrine: Negative for cold intolerance, heat intolerance, polydipsia, polyphagia and polyuria  Genitourinary: Negative for difficulty urinating, dysuria, flank pain, hematuria and urgency     Musculoskeletal: Positive for gait problem  Negative for arthralgias, back pain, joint swelling and myalgias  Severe ambulatory dysfunction   Skin: Negative for color change, rash and wound  Allergic/Immunologic: Negative for environmental allergies, food allergies and immunocompromised state  Neurological: Positive for numbness  Negative for dizziness, seizures, syncope, speech difficulty and headaches  Numbness left leg   Hematological: Negative for adenopathy  Does not bruise/bleed easily  Psychiatric/Behavioral: Negative for agitation, behavioral problems, hallucinations, sleep disturbance and suicidal ideas  Objective:      /82 (BP Location: Left arm, Patient Position: Sitting, Cuff Size: Standard)   Ht 5' 2" (1 575 m)   Wt 83 7 kg (184 lb 9 6 oz)   BMI 33 76 kg/m²          Physical Exam   Constitutional: She is oriented to person, place, and time  She appears well-developed and well-nourished  No distress  HENT:   Head: Normocephalic  Right Ear: External ear normal    Left Ear: External ear normal    Nose: Nose normal    Mouth/Throat: Oropharynx is clear and moist    Eyes: Pupils are equal, round, and reactive to light  Conjunctivae and EOM are normal  Right eye exhibits no discharge  Left eye exhibits no discharge  No scleral icterus  Neck: Normal range of motion  No tracheal deviation present  No thyromegaly present  Cardiovascular: Normal rate, regular rhythm and normal heart sounds  Exam reveals no gallop and no friction rub  No murmur heard  Pulmonary/Chest: Effort normal and breath sounds normal  No respiratory distress  She has no wheezes  Abdominal: Soft  Bowel sounds are normal  She exhibits no mass  There is no tenderness  There is no guarding  Musculoskeletal: She exhibits no edema or deformity  Lymphadenopathy:     She has no cervical adenopathy  Neurological: She is alert and oriented to person, place, and time  No cranial nerve deficit     Skin: Skin is warm and dry  No rash noted  She is not diaphoretic  No erythema  Psychiatric: She has a normal mood and affect   Thought content normal

## 2019-02-05 ENCOUNTER — LAB (OUTPATIENT)
Dept: LAB | Facility: MEDICAL CENTER | Age: 64
End: 2019-02-05
Payer: COMMERCIAL

## 2019-02-05 DIAGNOSIS — R73.02 GLUCOSE INTOLERANCE (IMPAIRED GLUCOSE TOLERANCE): ICD-10-CM

## 2019-02-05 LAB
ALBUMIN SERPL BCP-MCNC: 3.9 G/DL (ref 3.5–5)
ALP SERPL-CCNC: 81 U/L (ref 46–116)
ALT SERPL W P-5'-P-CCNC: 39 U/L (ref 12–78)
ANION GAP SERPL CALCULATED.3IONS-SCNC: 6 MMOL/L (ref 4–13)
AST SERPL W P-5'-P-CCNC: 27 U/L (ref 5–45)
BILIRUB SERPL-MCNC: 0.44 MG/DL (ref 0.2–1)
BUN SERPL-MCNC: 19 MG/DL (ref 5–25)
CALCIUM SERPL-MCNC: 9.3 MG/DL (ref 8.3–10.1)
CHLORIDE SERPL-SCNC: 108 MMOL/L (ref 100–108)
CHOLEST SERPL-MCNC: 198 MG/DL (ref 50–200)
CO2 SERPL-SCNC: 24 MMOL/L (ref 21–32)
CREAT SERPL-MCNC: 0.75 MG/DL (ref 0.6–1.3)
EST. AVERAGE GLUCOSE BLD GHB EST-MCNC: 128 MG/DL
GFR SERPL CREATININE-BSD FRML MDRD: 85 ML/MIN/1.73SQ M
GLUCOSE P FAST SERPL-MCNC: 108 MG/DL (ref 65–99)
HBA1C MFR BLD: 6.1 % (ref 4.2–6.3)
HDLC SERPL-MCNC: 55 MG/DL (ref 40–60)
LDLC SERPL CALC-MCNC: 109 MG/DL (ref 0–100)
NONHDLC SERPL-MCNC: 143 MG/DL
POTASSIUM SERPL-SCNC: 4.2 MMOL/L (ref 3.5–5.3)
PROT SERPL-MCNC: 8 G/DL (ref 6.4–8.2)
SODIUM SERPL-SCNC: 138 MMOL/L (ref 136–145)
TRIGL SERPL-MCNC: 169 MG/DL

## 2019-02-05 PROCEDURE — 83036 HEMOGLOBIN GLYCOSYLATED A1C: CPT

## 2019-02-05 PROCEDURE — 80061 LIPID PANEL: CPT | Performed by: FAMILY MEDICINE

## 2019-02-05 PROCEDURE — 36415 COLL VENOUS BLD VENIPUNCTURE: CPT | Performed by: FAMILY MEDICINE

## 2019-02-05 PROCEDURE — 80053 COMPREHEN METABOLIC PANEL: CPT

## 2019-02-06 NOTE — PROGRESS NOTES
Please call the patient regarding her abnormal result    Chemistry plan will is good blood sugar was 108 hemoglobin A1c was 6 1 which means her average blood glucose for the preceding 90 days was 128 she qualifies is prediabetic but not diabetic

## 2019-02-15 ENCOUNTER — HOSPITAL ENCOUNTER (OUTPATIENT)
Dept: MAMMOGRAPHY | Facility: HOSPITAL | Age: 64
Discharge: HOME | End: 2019-02-15
Attending: FAMILY MEDICINE
Payer: COMMERCIAL

## 2019-02-15 VITALS — HEIGHT: 62 IN | WEIGHT: 184 LBS | BODY MASS INDEX: 33.86 KG/M2

## 2019-02-15 DIAGNOSIS — Z12.31 ENCOUNTER FOR SCREENING MAMMOGRAM FOR BREAST CANCER: ICD-10-CM

## 2019-02-15 PROCEDURE — 77063 BREAST TOMOSYNTHESIS BI: CPT

## 2019-02-15 PROCEDURE — 77067 SCR MAMMO BI INCL CAD: CPT

## 2019-04-01 ENCOUNTER — TRANSCRIBE ORDERS (OUTPATIENT)
Dept: PHYSICAL THERAPY | Facility: CLINIC | Age: 64
End: 2019-04-01

## 2019-04-01 ENCOUNTER — EVALUATION (OUTPATIENT)
Dept: PHYSICAL THERAPY | Facility: CLINIC | Age: 64
End: 2019-04-01
Payer: COMMERCIAL

## 2019-04-01 DIAGNOSIS — M48.062 SPINAL STENOSIS, LUMBAR REGION WITH NEUROGENIC CLAUDICATION: Primary | ICD-10-CM

## 2019-04-01 PROCEDURE — 97163 PT EVAL HIGH COMPLEX 45 MIN: CPT | Performed by: PHYSICAL THERAPIST

## 2019-04-22 ENCOUNTER — HOSPITAL ENCOUNTER (OUTPATIENT)
Dept: MRI IMAGING | Facility: HOSPITAL | Age: 64
Discharge: HOME | End: 2019-04-22
Attending: FAMILY MEDICINE
Payer: COMMERCIAL

## 2019-04-22 DIAGNOSIS — M48.062 SPINAL STENOSIS OF LUMBAR REGION WITH NEUROGENIC CLAUDICATION: ICD-10-CM

## 2019-04-22 PROCEDURE — 72148 MRI LUMBAR SPINE W/O DYE: CPT

## 2019-06-05 ENCOUNTER — OFFICE VISIT (OUTPATIENT)
Dept: FAMILY MEDICINE CLINIC | Facility: CLINIC | Age: 64
End: 2019-06-05
Payer: COMMERCIAL

## 2019-06-05 VITALS
HEIGHT: 62 IN | BODY MASS INDEX: 34.12 KG/M2 | DIASTOLIC BLOOD PRESSURE: 94 MMHG | WEIGHT: 185.4 LBS | SYSTOLIC BLOOD PRESSURE: 162 MMHG

## 2019-06-05 DIAGNOSIS — H69.83 DYSFUNCTION OF BOTH EUSTACHIAN TUBES: Primary | ICD-10-CM

## 2019-06-05 PROCEDURE — 99213 OFFICE O/P EST LOW 20 MIN: CPT | Performed by: FAMILY MEDICINE

## 2019-06-18 ENCOUNTER — TRANSITIONAL CARE MANAGEMENT (OUTPATIENT)
Dept: FAMILY MEDICINE CLINIC | Facility: CLINIC | Age: 64
End: 2019-06-18

## 2019-06-18 ENCOUNTER — TELEPHONE (OUTPATIENT)
Dept: FAMILY MEDICINE CLINIC | Facility: CLINIC | Age: 64
End: 2019-06-18

## 2019-06-21 ENCOUNTER — OFFICE VISIT (OUTPATIENT)
Dept: FAMILY MEDICINE CLINIC | Facility: CLINIC | Age: 64
End: 2019-06-21
Payer: COMMERCIAL

## 2019-06-21 VITALS
BODY MASS INDEX: 33.79 KG/M2 | HEIGHT: 62 IN | TEMPERATURE: 100.2 F | SYSTOLIC BLOOD PRESSURE: 128 MMHG | WEIGHT: 183.6 LBS | DIASTOLIC BLOOD PRESSURE: 86 MMHG

## 2019-06-21 DIAGNOSIS — IMO0001 TRANSITION OF CARE PERFORMED WITH SHARING OF CLINICAL SUMMARY: Primary | ICD-10-CM

## 2019-06-21 PROCEDURE — 99495 TRANSJ CARE MGMT MOD F2F 14D: CPT | Performed by: FAMILY MEDICINE

## 2019-06-21 RX ORDER — CEPHALEXIN 500 MG/1
CAPSULE ORAL
Refills: 0 | COMMUNITY
Start: 2019-06-19 | End: 2020-04-08 | Stop reason: ALTCHOICE

## 2019-06-21 RX ORDER — ACETAMINOPHEN 500 MG
1000 TABLET ORAL EVERY 6 HOURS PRN
COMMUNITY
End: 2021-10-11 | Stop reason: ALTCHOICE

## 2019-06-21 RX ORDER — OXYCODONE HYDROCHLORIDE 5 MG/1
5 TABLET ORAL EVERY 4 HOURS PRN
COMMUNITY
Start: 2019-06-17 | End: 2020-04-08 | Stop reason: ALTCHOICE

## 2019-06-21 RX ORDER — CYCLOBENZAPRINE HCL 5 MG
5 TABLET ORAL 3 TIMES DAILY
COMMUNITY
Start: 2019-06-17 | End: 2020-04-08 | Stop reason: ALTCHOICE

## 2019-09-13 ENCOUNTER — TELEPHONE (OUTPATIENT)
Dept: FAMILY MEDICINE CLINIC | Facility: CLINIC | Age: 64
End: 2019-09-13

## 2019-09-13 DIAGNOSIS — B37.3 VAGINAL MONILIASIS: Primary | ICD-10-CM

## 2019-09-13 RX ORDER — FLUCONAZOLE 150 MG/1
150 TABLET ORAL ONCE
Qty: 1 TABLET | Refills: 0 | Status: SHIPPED | OUTPATIENT
Start: 2019-09-13 | End: 2019-09-13

## 2019-09-13 NOTE — TELEPHONE ENCOUNTER
She was on 2 antibiotics from her dentist and feels she has a yeast infection and requesting something be sent to [address] [place], [place] [id number] Raines

## 2020-04-08 ENCOUNTER — TELEMEDICINE (OUTPATIENT)
Dept: FAMILY MEDICINE CLINIC | Facility: CLINIC | Age: 65
End: 2020-04-08
Payer: COMMERCIAL

## 2020-04-08 VITALS — BODY MASS INDEX: 31.65 KG/M2 | WEIGHT: 172 LBS | HEIGHT: 62 IN

## 2020-04-08 DIAGNOSIS — I10 BENIGN ESSENTIAL HYPERTENSION: ICD-10-CM

## 2020-04-08 DIAGNOSIS — K21.00 GASTROESOPHAGEAL REFLUX DISEASE WITH ESOPHAGITIS: ICD-10-CM

## 2020-04-08 DIAGNOSIS — J30.1 SEASONAL ALLERGIC RHINITIS DUE TO POLLEN: Primary | ICD-10-CM

## 2020-04-08 PROCEDURE — 99213 OFFICE O/P EST LOW 20 MIN: CPT | Performed by: FAMILY MEDICINE

## 2020-04-08 PROCEDURE — 3008F BODY MASS INDEX DOCD: CPT | Performed by: FAMILY MEDICINE

## 2020-04-08 RX ORDER — FEXOFENADINE HCL 180 MG/1
180 TABLET ORAL DAILY
Qty: 30 TABLET | Refills: 5 | Status: SHIPPED | OUTPATIENT
Start: 2020-04-08 | End: 2020-10-20 | Stop reason: ALTCHOICE

## 2020-04-08 RX ORDER — MONTELUKAST SODIUM 10 MG/1
10 TABLET ORAL
Qty: 30 TABLET | Refills: 5 | Status: SHIPPED | OUTPATIENT
Start: 2020-04-08 | End: 2020-10-20 | Stop reason: ALTCHOICE

## 2020-04-08 RX ORDER — FLUTICASONE PROPIONATE 50 MCG
2 SPRAY, SUSPENSION (ML) NASAL DAILY
Qty: 1 BOTTLE | Refills: 5 | Status: SHIPPED | OUTPATIENT
Start: 2020-04-08 | End: 2020-10-20 | Stop reason: ALTCHOICE

## 2020-04-23 ENCOUNTER — TELEPHONE (OUTPATIENT)
Dept: FAMILY MEDICINE CLINIC | Facility: CLINIC | Age: 65
End: 2020-04-23

## 2020-04-23 DIAGNOSIS — J01.90 ACUTE SINUSITIS, RECURRENCE NOT SPECIFIED, UNSPECIFIED LOCATION: Primary | ICD-10-CM

## 2020-04-23 RX ORDER — AMOXICILLIN 500 MG/1
500 CAPSULE ORAL EVERY 8 HOURS SCHEDULED
Qty: 30 CAPSULE | Refills: 0 | Status: SHIPPED | OUTPATIENT
Start: 2020-04-23 | End: 2020-05-03

## 2020-10-20 ENCOUNTER — OFFICE VISIT (OUTPATIENT)
Dept: FAMILY MEDICINE CLINIC | Facility: CLINIC | Age: 65
End: 2020-10-20
Payer: MEDICARE

## 2020-10-20 ENCOUNTER — LAB (OUTPATIENT)
Dept: LAB | Facility: MEDICAL CENTER | Age: 65
End: 2020-10-20
Payer: MEDICARE

## 2020-10-20 VITALS
BODY MASS INDEX: 30.88 KG/M2 | WEIGHT: 167.8 LBS | HEIGHT: 62 IN | DIASTOLIC BLOOD PRESSURE: 98 MMHG | SYSTOLIC BLOOD PRESSURE: 154 MMHG | TEMPERATURE: 97.8 F

## 2020-10-20 DIAGNOSIS — Z12.31 ENCOUNTER FOR SCREENING MAMMOGRAM FOR BREAST CANCER: Primary | ICD-10-CM

## 2020-10-20 DIAGNOSIS — R10.9 ABDOMINAL CRAMPS: ICD-10-CM

## 2020-10-20 DIAGNOSIS — R10.13 EPIGASTRIC PAIN: ICD-10-CM

## 2020-10-20 LAB
ALBUMIN SERPL BCP-MCNC: 3.9 G/DL (ref 3.5–5)
ALP SERPL-CCNC: 89 U/L (ref 46–116)
ALT SERPL W P-5'-P-CCNC: 38 U/L (ref 12–78)
AMYLASE SERPL-CCNC: 62 IU/L (ref 25–115)
ANION GAP SERPL CALCULATED.3IONS-SCNC: 5 MMOL/L (ref 4–13)
AST SERPL W P-5'-P-CCNC: 23 U/L (ref 5–45)
BILIRUB SERPL-MCNC: 0.45 MG/DL (ref 0.2–1)
BUN SERPL-MCNC: 17 MG/DL (ref 5–25)
CALCIUM SERPL-MCNC: 9.4 MG/DL (ref 8.3–10.1)
CHLORIDE SERPL-SCNC: 106 MMOL/L (ref 100–108)
CO2 SERPL-SCNC: 29 MMOL/L (ref 21–32)
CREAT SERPL-MCNC: 0.76 MG/DL (ref 0.6–1.3)
ERYTHROCYTE [DISTWIDTH] IN BLOOD BY AUTOMATED COUNT: 11.4 % (ref 11.6–15.1)
GFR SERPL CREATININE-BSD FRML MDRD: 83 ML/MIN/1.73SQ M
GLUCOSE P FAST SERPL-MCNC: 92 MG/DL (ref 65–99)
HCT VFR BLD AUTO: 48.5 % (ref 34.8–46.1)
HGB BLD-MCNC: 16.1 G/DL (ref 11.5–15.4)
LIPASE SERPL-CCNC: 102 U/L (ref 73–393)
MCH RBC QN AUTO: 31.3 PG (ref 26.8–34.3)
MCHC RBC AUTO-ENTMCNC: 33.2 G/DL (ref 31.4–37.4)
MCV RBC AUTO: 94 FL (ref 82–98)
PLATELET # BLD AUTO: 292 THOUSANDS/UL (ref 149–390)
PMV BLD AUTO: 10.2 FL (ref 8.9–12.7)
POTASSIUM SERPL-SCNC: 4.4 MMOL/L (ref 3.5–5.3)
PROT SERPL-MCNC: 8.2 G/DL (ref 6.4–8.2)
RBC # BLD AUTO: 5.14 MILLION/UL (ref 3.81–5.12)
SODIUM SERPL-SCNC: 140 MMOL/L (ref 136–145)
WBC # BLD AUTO: 7.09 THOUSAND/UL (ref 4.31–10.16)

## 2020-10-20 PROCEDURE — 83690 ASSAY OF LIPASE: CPT

## 2020-10-20 PROCEDURE — 36415 COLL VENOUS BLD VENIPUNCTURE: CPT

## 2020-10-20 PROCEDURE — 80053 COMPREHEN METABOLIC PANEL: CPT | Performed by: FAMILY MEDICINE

## 2020-10-20 PROCEDURE — 85027 COMPLETE CBC AUTOMATED: CPT | Performed by: FAMILY MEDICINE

## 2020-10-20 PROCEDURE — 82150 ASSAY OF AMYLASE: CPT

## 2020-10-20 PROCEDURE — 99214 OFFICE O/P EST MOD 30 MIN: CPT | Performed by: FAMILY MEDICINE

## 2020-10-23 ENCOUNTER — HOSPITAL ENCOUNTER (OUTPATIENT)
Dept: ULTRASOUND IMAGING | Facility: HOSPITAL | Age: 65
Discharge: HOME | End: 2020-10-23
Attending: FAMILY MEDICINE
Payer: MEDICARE

## 2020-10-23 ENCOUNTER — HOSPITAL ENCOUNTER (OUTPATIENT)
Dept: RADIOLOGY | Facility: HOSPITAL | Age: 65
Discharge: HOME | End: 2020-10-23
Attending: FAMILY MEDICINE
Payer: MEDICARE

## 2020-10-23 DIAGNOSIS — R10.13 EPIGASTRIC PAIN: ICD-10-CM

## 2020-10-23 DIAGNOSIS — R19.4 CHANGE IN BOWEL HABITS: ICD-10-CM

## 2020-10-23 DIAGNOSIS — K21.00 GASTROESOPHAGEAL REFLUX DISEASE WITH ESOPHAGITIS, UNSPECIFIED WHETHER HEMORRHAGE: Primary | ICD-10-CM

## 2020-10-23 PROCEDURE — 76700 US EXAM ABDOM COMPLETE: CPT

## 2020-10-23 PROCEDURE — 74240 X-RAY XM UPR GI TRC 1CNTRST: CPT

## 2020-10-26 ENCOUNTER — TELEPHONE (OUTPATIENT)
Dept: GASTROENTEROLOGY | Facility: CLINIC | Age: 65
End: 2020-10-26

## 2020-12-02 ENCOUNTER — CONSULT (OUTPATIENT)
Dept: GASTROENTEROLOGY | Facility: CLINIC | Age: 65
End: 2020-12-02
Payer: MEDICARE

## 2020-12-02 VITALS
TEMPERATURE: 98.5 F | DIASTOLIC BLOOD PRESSURE: 75 MMHG | HEIGHT: 62 IN | WEIGHT: 172 LBS | SYSTOLIC BLOOD PRESSURE: 140 MMHG | HEART RATE: 78 BPM | BODY MASS INDEX: 31.65 KG/M2

## 2020-12-02 DIAGNOSIS — R10.84 GENERALIZED ABDOMINAL PAIN: ICD-10-CM

## 2020-12-02 DIAGNOSIS — K21.9 GASTROESOPHAGEAL REFLUX DISEASE WITHOUT ESOPHAGITIS: Primary | ICD-10-CM

## 2020-12-02 DIAGNOSIS — K21.00 GASTROESOPHAGEAL REFLUX DISEASE WITH ESOPHAGITIS, UNSPECIFIED WHETHER HEMORRHAGE: ICD-10-CM

## 2020-12-02 DIAGNOSIS — R10.13 EPIGASTRIC PAIN: ICD-10-CM

## 2020-12-02 DIAGNOSIS — R11.0 NAUSEA: ICD-10-CM

## 2020-12-02 DIAGNOSIS — R19.4 CHANGE IN BOWEL HABITS: ICD-10-CM

## 2020-12-02 DIAGNOSIS — Z86.010 HISTORY OF COLON POLYPS: ICD-10-CM

## 2020-12-02 DIAGNOSIS — K59.00 CONSTIPATION, UNSPECIFIED CONSTIPATION TYPE: ICD-10-CM

## 2020-12-02 PROCEDURE — 99204 OFFICE O/P NEW MOD 45 MIN: CPT | Performed by: INTERNAL MEDICINE

## 2020-12-02 RX ORDER — SODIUM, POTASSIUM,MAG SULFATES 17.5-3.13G
180 SOLUTION, RECONSTITUTED, ORAL ORAL ONCE
Qty: 180 ML | Refills: 0 | Status: SHIPPED | OUTPATIENT
Start: 2020-12-02 | End: 2020-12-16 | Stop reason: ALTCHOICE

## 2020-12-16 ENCOUNTER — OFFICE VISIT (OUTPATIENT)
Dept: FAMILY MEDICINE CLINIC | Facility: CLINIC | Age: 65
End: 2020-12-16
Payer: MEDICARE

## 2020-12-16 VITALS
BODY MASS INDEX: 31.83 KG/M2 | DIASTOLIC BLOOD PRESSURE: 88 MMHG | SYSTOLIC BLOOD PRESSURE: 144 MMHG | TEMPERATURE: 96.5 F | WEIGHT: 173 LBS | HEIGHT: 62 IN

## 2020-12-16 DIAGNOSIS — I10 BENIGN ESSENTIAL HYPERTENSION: ICD-10-CM

## 2020-12-16 DIAGNOSIS — L30.9 DERMATITIS: Primary | ICD-10-CM

## 2020-12-16 DIAGNOSIS — A69.20 LYME DISEASE: ICD-10-CM

## 2020-12-16 PROCEDURE — 99213 OFFICE O/P EST LOW 20 MIN: CPT | Performed by: FAMILY MEDICINE

## 2020-12-16 RX ORDER — KETOCONAZOLE 20 MG/G
CREAM TOPICAL DAILY
Qty: 15 G | Refills: 1 | Status: SHIPPED | OUTPATIENT
Start: 2020-12-16 | End: 2021-10-11 | Stop reason: ALTCHOICE

## 2020-12-16 RX ORDER — DOXYCYCLINE HYCLATE 100 MG
100 TABLET ORAL 2 TIMES DAILY
Qty: 20 TABLET | Refills: 0 | Status: SHIPPED | OUTPATIENT
Start: 2020-12-16 | End: 2020-12-26

## 2020-12-17 NOTE — TELEPHONE ENCOUNTER
Gris Davis is here for Initial evaluation of potential COVID-19 exposure.     TT TM r/t [employer] alert w/sx  Sx began 12/15    No Exposure  TM self scheduled testing for 12/18 at 1100 at [facility]    Emailed manager  Off work-symptomatic TM letter          Your lab appointment was scheduled during today's call.  You are to remain off work and out of public places except to seek medical care, and complete the symptom tracker daily on [employer].  YOU MAY NOT RETURN TO WORK WITHOUT CLEARANCE FROM EMPLOYEE HEALTH.    Off work start date: 12/17/20                Prescription sent to her pharmacy

## 2020-12-24 ENCOUNTER — TELEPHONE (OUTPATIENT)
Dept: FAMILY MEDICINE CLINIC | Facility: CLINIC | Age: 65
End: 2020-12-24

## 2020-12-24 DIAGNOSIS — L30.9 DERMATITIS: Primary | ICD-10-CM

## 2020-12-24 RX ORDER — MOMETASONE FUROATE 1 MG/G
CREAM TOPICAL 2 TIMES DAILY
Qty: 30 G | Refills: 0 | Status: SHIPPED | OUTPATIENT
Start: 2020-12-24 | End: 2021-10-11 | Stop reason: ALTCHOICE

## 2020-12-28 ENCOUNTER — TELEPHONE (OUTPATIENT)
Dept: FAMILY MEDICINE CLINIC | Facility: CLINIC | Age: 65
End: 2020-12-28

## 2021-01-18 ENCOUNTER — TELEPHONE (OUTPATIENT)
Dept: GASTROENTEROLOGY | Facility: CLINIC | Age: 66
End: 2021-01-18

## 2021-01-20 ENCOUNTER — TELEPHONE (OUTPATIENT)
Dept: GASTROENTEROLOGY | Facility: CLINIC | Age: 66
End: 2021-01-20

## 2021-05-25 ENCOUNTER — TELEPHONE (OUTPATIENT)
Dept: FAMILY MEDICINE CLINIC | Facility: CLINIC | Age: 66
End: 2021-05-25

## 2021-05-25 NOTE — TELEPHONE ENCOUNTER
Patient called started with bad upper stomach pains, started taking Omeprazole and probiotic and pain started to ease a little bit but comes back worse  Cant get in for an appt until Thursday  Do you recommend anything else?

## 2021-05-26 ENCOUNTER — TELEPHONE (OUTPATIENT)
Dept: GASTROENTEROLOGY | Facility: CLINIC | Age: 66
End: 2021-05-26

## 2021-05-26 ENCOUNTER — PREP FOR PROCEDURE (OUTPATIENT)
Dept: GASTROENTEROLOGY | Facility: CLINIC | Age: 66
End: 2021-05-26

## 2021-05-26 DIAGNOSIS — R11.0 NAUSEA: ICD-10-CM

## 2021-05-26 DIAGNOSIS — K21.9 GASTROESOPHAGEAL REFLUX DISEASE, UNSPECIFIED WHETHER ESOPHAGITIS PRESENT: Primary | ICD-10-CM

## 2021-05-26 DIAGNOSIS — K21.9 GASTROESOPHAGEAL REFLUX DISEASE WITHOUT ESOPHAGITIS: Primary | ICD-10-CM

## 2021-05-26 RX ORDER — SUCRALFATE 1 G/1
1 TABLET ORAL 4 TIMES DAILY
Qty: 120 TABLET | Refills: 1 | Status: SHIPPED | OUTPATIENT
Start: 2021-05-26 | End: 2021-10-11 | Stop reason: SDUPTHER

## 2021-05-26 NOTE — TELEPHONE ENCOUNTER
Patient said she cancelled due to Jie, she tried rescheduling but they haven't returned her call  I told her I will have the  try to look into this to get her an appt  She is agreeable

## 2021-05-26 NOTE — TELEPHONE ENCOUNTER
Why did she cancel her upper GI endoscopy that would have given us an answer as to what is wrong the only thing I can suggest is a continue the omeprazole avoid all caffeinated beverages or spicy foods or alcohol do not take ibuprofen or Aleve and I will call in some Carafate to see if that helps but she really needs to get her stomach scoped

## 2021-05-26 NOTE — TELEPHONE ENCOUNTER
Called patient to reschedule Colonoscopy and EGD  Scheduled 7/27/21 with Dr Carlos Corley  Procedure packet with Miralax/Dulcolax instructions sent via mail  All procedure directions explained to patient  She expressed understanding

## 2021-06-08 ENCOUNTER — TELEPHONE (OUTPATIENT)
Dept: GASTROENTEROLOGY | Facility: CLINIC | Age: 66
End: 2021-06-08

## 2021-06-08 NOTE — TELEPHONE ENCOUNTER
HUGO - I called Marvin Thomas  It does sound like she has a pelvic floor issue  She strains and does manual maneuvers to allow herself to have a BM  She denies any rectal bleeding, abdominal pain, or abnormal weight loss  I explained the doctor can perform rectal exam under anesthesia prior to colonoscopy next month  After colonoscopy, the doctor can refer to colorectal surgery if they feel necessary  I asked her to start MiraLax 1-2 times daily to prevent straining  She expressed understanding and all questions were answered

## 2021-07-26 ENCOUNTER — TELEPHONE (OUTPATIENT)
Dept: SURGERY | Facility: HOSPITAL | Age: 66
End: 2021-07-26

## 2021-07-27 ENCOUNTER — HOSPITAL ENCOUNTER (OUTPATIENT)
Dept: PERIOP | Facility: HOSPITAL | Age: 66
Setting detail: OUTPATIENT SURGERY
Discharge: HOME | End: 2021-07-27
Attending: INTERNAL MEDICINE | Admitting: INTERNAL MEDICINE
Payer: MEDICARE

## 2021-07-27 ENCOUNTER — ANESTHESIA (OUTPATIENT)
Dept: PERIOP | Facility: HOSPITAL | Age: 66
End: 2021-07-27

## 2021-07-27 ENCOUNTER — ANESTHESIA EVENT (OUTPATIENT)
Dept: PERIOP | Facility: HOSPITAL | Age: 66
End: 2021-07-27

## 2021-07-27 VITALS
OXYGEN SATURATION: 97 % | SYSTOLIC BLOOD PRESSURE: 121 MMHG | TEMPERATURE: 97 F | DIASTOLIC BLOOD PRESSURE: 86 MMHG | HEART RATE: 63 BPM | RESPIRATION RATE: 18 BRPM

## 2021-07-27 DIAGNOSIS — K21.9 GASTROESOPHAGEAL REFLUX DISEASE WITHOUT ESOPHAGITIS: ICD-10-CM

## 2021-07-27 DIAGNOSIS — R11.0 NAUSEA: ICD-10-CM

## 2021-07-27 PROCEDURE — 43239 EGD BIOPSY SINGLE/MULTIPLE: CPT | Performed by: INTERNAL MEDICINE

## 2021-07-27 PROCEDURE — 45385 COLONOSCOPY W/LESION REMOVAL: CPT | Performed by: INTERNAL MEDICINE

## 2021-07-27 PROCEDURE — 88342 IMHCHEM/IMCYTCHM 1ST ANTB: CPT | Performed by: PATHOLOGY

## 2021-07-27 PROCEDURE — 88305 TISSUE EXAM BY PATHOLOGIST: CPT | Performed by: PATHOLOGY

## 2021-07-27 RX ORDER — LIDOCAINE HYDROCHLORIDE 20 MG/ML
INJECTION, SOLUTION EPIDURAL; INFILTRATION; INTRACAUDAL; PERINEURAL AS NEEDED
Status: DISCONTINUED | OUTPATIENT
Start: 2021-07-27 | End: 2021-07-27

## 2021-07-27 RX ORDER — SODIUM CHLORIDE, SODIUM LACTATE, POTASSIUM CHLORIDE, CALCIUM CHLORIDE 600; 310; 30; 20 MG/100ML; MG/100ML; MG/100ML; MG/100ML
100 INJECTION, SOLUTION INTRAVENOUS CONTINUOUS
Status: DISCONTINUED | OUTPATIENT
Start: 2021-07-27 | End: 2021-07-31 | Stop reason: HOSPADM

## 2021-07-27 RX ORDER — PROPOFOL 10 MG/ML
INJECTION, EMULSION INTRAVENOUS CONTINUOUS PRN
Status: DISCONTINUED | OUTPATIENT
Start: 2021-07-27 | End: 2021-07-27

## 2021-07-27 RX ORDER — SODIUM CHLORIDE, SODIUM LACTATE, POTASSIUM CHLORIDE, CALCIUM CHLORIDE 600; 310; 30; 20 MG/100ML; MG/100ML; MG/100ML; MG/100ML
125 INJECTION, SOLUTION INTRAVENOUS CONTINUOUS
Status: DISCONTINUED | OUTPATIENT
Start: 2021-07-27 | End: 2021-07-31 | Stop reason: HOSPADM

## 2021-07-27 RX ORDER — ONDANSETRON 2 MG/ML
4 INJECTION INTRAMUSCULAR; INTRAVENOUS ONCE AS NEEDED
Status: DISCONTINUED | OUTPATIENT
Start: 2021-07-27 | End: 2021-07-31 | Stop reason: HOSPADM

## 2021-07-27 RX ORDER — PROPOFOL 10 MG/ML
INJECTION, EMULSION INTRAVENOUS AS NEEDED
Status: DISCONTINUED | OUTPATIENT
Start: 2021-07-27 | End: 2021-07-27

## 2021-07-27 RX ADMIN — PROPOFOL 100 MG: 10 INJECTION, EMULSION INTRAVENOUS at 08:39

## 2021-07-27 RX ADMIN — PROPOFOL 150 MCG/KG/MIN: 10 INJECTION, EMULSION INTRAVENOUS at 08:47

## 2021-07-27 RX ADMIN — SODIUM CHLORIDE, SODIUM LACTATE, POTASSIUM CHLORIDE, AND CALCIUM CHLORIDE: .6; .31; .03; .02 INJECTION, SOLUTION INTRAVENOUS at 08:34

## 2021-07-27 RX ADMIN — SODIUM CHLORIDE, SODIUM LACTATE, POTASSIUM CHLORIDE, AND CALCIUM CHLORIDE 125 ML/HR: .6; .31; .03; .02 INJECTION, SOLUTION INTRAVENOUS at 09:12

## 2021-07-27 RX ADMIN — PROPOFOL 50 MG: 10 INJECTION, EMULSION INTRAVENOUS at 08:41

## 2021-07-27 RX ADMIN — LIDOCAINE HYDROCHLORIDE 100 MG: 20 INJECTION, SOLUTION EPIDURAL; INFILTRATION; INTRACAUDAL at 08:39

## 2021-07-27 NOTE — ANESTHESIA PREPROCEDURE EVALUATION
Procedure:  COLONOSCOPY  EGD    Relevant Problems   CARDIO   (+) Benign essential hypertension      GI/HEPATIC   (+) Gastroesophageal reflux disease      Other   (+) Benign paroxysmal vertigo        Physical Exam    Airway    Mallampati score: II  TM Distance: >3 FB  Neck ROM: full     Dental       Cardiovascular      Pulmonary      Other Findings        Anesthesia Plan  ASA Score- 2     Anesthesia Type- IV sedation with anesthesia with ASA Monitors  Additional Monitors:   Airway Plan:           Plan Factors-Exercise tolerance (METS): >4 METS  Chart reviewed  Existing labs reviewed  Patient summary reviewed  Induction- intravenous  Postoperative Plan-     Informed Consent- Anesthetic plan and risks discussed with patient  I personally reviewed this patient with the CRNA  Discussed and agreed on the Anesthesia Plan with the CRNA  Deja Dykes

## 2021-07-27 NOTE — H&P
History and Physical -  [facility]  Rakan Jay 77 y o  female MRN: [id number]                  HPI: Rakan Jay is a 77y o  year old female who presents for:    1  Gastroesophageal reflux disease without esophagitis  2  Nausea  3  Constipation, unspecified constipation type  4  Generalized abdominal pain  5  History of colon polyps      REVIEW OF SYSTEMS: Per the HPI, and otherwise unremarkable      Historical Information   Past Medical History:   Diagnosis Date    Bell's palsy     Colon polyp     Hypertension     Kidney calculi     Spinal stenosis      Past Surgical History:   Procedure Laterality Date    CHOLECYSTECTOMY      COLONOSCOPY      ENDOMETRIAL ABLATION      LAMINECTOMY  06/14/2019    Lumbar spine - With placement of Trial device nonfusion device     Social History   Social History     Substance and Sexual Activity   Alcohol Use Yes    Comment: occasionally     Social History     Substance and Sexual Activity   Drug Use No     Social History     Tobacco Use   Smoking Status Never Smoker   Smokeless Tobacco Never Used     Family History   Problem Relation Age of Onset    Dementia Mother     Heart disease Mother     Emphysema Father     Colon cancer Maternal Uncle        Meds/Allergies       Current Outpatient Medications:     acetaminophen (TYLENOL) 500 mg tablet    Ascorbic Acid (VITAMIN C PO)    cholecalciferol (VITAMIN D3) 1,000 units tablet    ketoconazole (NIZORAL) 2 % cream    mometasone (ELOCON) 0 1 % cream    sucralfate (CARAFATE) 1 g tablet    Current Facility-Administered Medications:     lactated ringers infusion, 100 mL/hr, Intravenous, Continuous    Allergies   Allergen Reactions    Ciprofloxacin     Lisinopril      Pt had one dose and became became presyncopal    Prednisone GI Intolerance    Tramadol        Objective     /82   Pulse 80   Temp (!) 96 4 °F (35 8 °C)   Resp 18   SpO2 95%       PHYSICAL EXAM    Gen: NAD  Head: NCAT  CV: RRR  CHEST: Clear  ABD: soft, NT/ND  EXT: no edema      ASSESSMENT/PLAN:  This is a 77y o  year old female here for upper endoscopy and colonoscopy, and she is stable and optimized for her procedure

## 2021-08-02 ENCOUNTER — TELEPHONE (OUTPATIENT)
Dept: GASTROENTEROLOGY | Facility: CLINIC | Age: 66
End: 2021-08-02

## 2021-08-02 NOTE — TELEPHONE ENCOUNTER
Patients GI provider:  Dr Osei Cornelius    Number to return call:  [phone]    Reason for call: Pt calling stating she received a notice in her mychart regarding her colon/egd results that was alarming to her and would like to speak to someone  Pt would like to be called to discuss      Scheduled procedure/appointment date if applicable:NA

## 2021-08-02 NOTE — TELEPHONE ENCOUNTER
Pt of Dr Oz Rizo last seen in office 12/02/2020   EGD/colonoscopy performed 7/27/2021- colonoscopy will need to be repeated d/t poor prep  EGD was normal  Biopsies showed minimal gastritis but overall unremarkable   HX: GERD, constipation/pelvic floor dysfunction     Pt was concerned with biopsy results d/t seeing the results on my chart and misunderstanding some of the results  I explained to pt the biopsies were unremarkable and I reviewed what mild gastritis meant and common causes  Pt understood and stated she does not drink and will avoid NSAIDs  Went over GERD diet  She takes her Carafate as prescribed  Explained to me still having trouble with BMs  Was doing okay but did not have BM yet today and has pain in back again  I explained to increase mirilax to twice a day  Add fiber supplement like metamucil  Drink 6-8 glasses of water  Increase activity  Explained avoiding straining  She would like f/u appt

## 2021-08-05 NOTE — RESULT ENCOUNTER NOTE
I called her with her results  Cecal polyp was an adenoma  Repeat colonoscopy in one year because of the inadequate bowel preparation  Repeat upper endoscopy in three years because of the possible intestinal metaplasia  It will also be an opportunity to re-biopsy for Helicobacter pylori infection since these biopsies were equivocal favoring negative

## 2021-09-17 ENCOUNTER — OFFICE VISIT (OUTPATIENT)
Dept: GASTROENTEROLOGY | Facility: CLINIC | Age: 66
End: 2021-09-17
Payer: MEDICARE

## 2021-09-17 VITALS
DIASTOLIC BLOOD PRESSURE: 84 MMHG | SYSTOLIC BLOOD PRESSURE: 146 MMHG | HEIGHT: 62 IN | HEART RATE: 78 BPM | BODY MASS INDEX: 31.1 KG/M2 | TEMPERATURE: 98.8 F | WEIGHT: 169 LBS

## 2021-09-17 DIAGNOSIS — K59.00 CONSTIPATION, UNSPECIFIED CONSTIPATION TYPE: ICD-10-CM

## 2021-09-17 DIAGNOSIS — K21.9 GASTROESOPHAGEAL REFLUX DISEASE WITHOUT ESOPHAGITIS: Primary | ICD-10-CM

## 2021-09-17 PROCEDURE — 99214 OFFICE O/P EST MOD 30 MIN: CPT | Performed by: NURSE PRACTITIONER

## 2021-09-17 NOTE — PROGRESS NOTES
[facility] - Outpatient Follow-up Note  Bree Jacobo 77 y o  female MRN: [id number]  Encounter: [id number]          ASSESSMENT AND PLAN:      1  Gastroesophageal reflux disease without esophagitis  Patient originally seen in December of 2020 with complaints of reflux and nausea  She underwent EGD with normal appearing esophagus, stomach and duodenum with biopsies revealing chronic inactive gastritis, equivocal immunostain for H pylori, favor negative, a minute focus suspicious for intestinal metaplasia, no dysplasia or neoplasia identified  Patient denies nausea, vomiting, dysphagia or reflux at this time  She is taking Carafate with good relief  It was recommended that she repeat her EGD in 3 years due to possible intestinal metaplasia  - continue Carafate   - repeat EGD in 2024    2  Constipation, unspecified constipation type    Patient originally seen in December 2020 with complaints of constipation and generalized abdominal pain  She underwent colonoscopy that revealed a poor prep, 1 cecal polyp with portions of tubular adenoma, negative for high-grade dysplasia and a transverse colon polyp with polypoid colonic mucosa suggesting inflammatory polyp  It was recommended that patient repeat colonoscopy in 1 year due to poor prep  Patient currently taking MiraLax 1 cap full daily and denies abdominal pain  She reports going up to 5 times a day, stool is still formed  Discussed titrating MiraLax up or down to produce at least 1 bowel movement a day  - continue MiraLax, titrate up or down as needed  - repeat colonoscopy next year    Will see patient back in 9 months or sooner if needed  ______________________________________________________________________    SUBJECTIVE:    Vladislav Rodriguez is a 77year old female that presents today for a follow-up after EGD and colonoscopy    Patient was originally seen here in December of 2020 with complaints of reflux, nausea, constipation and generalized abdominal pain  She underwent colonoscopy and EGD in July of this year  The EGD revealed a normal appearing esophagus, stomach and duodenum with biopsies revealing chronic inactive gastritis, equivocal immunostain for H pylori, favor negative, a minute focus suspicious for intestinal metaplasia, no dysplasia or neoplasia identified  The colonoscopy revealed a poor prep, 1 cecal polyp with portions of tubular adenoma, negative for high-grade dysplasia and a transverse colon polyp with polypoid colonic mucosa suggesting inflammatory polyp  It was recommended that patient repeat colonoscopy in 1 year due to poor prep and repeat upper endoscopy in 3 years due to possible intestinal metaplasia and re-biopsy for H pylori  Patient reports feeling well, she denies abdominal pain, bloating, nausea, vomiting, dysphagia or reflux  She reports taking a Carafate which gives her good relief  She reports taking 1 cap full of MiraLax daily which seems to be helpful for her, however she reports going up to 5 times a day but states that it is still formed  She denies unexpected weight loss, black or bloody stools  REVIEW OF SYSTEMS:  Review of Systems   Constitutional: Negative for fatigue, fever and unexpected weight change  HENT: Negative for trouble swallowing  Respiratory: Negative for shortness of breath  Cardiovascular: Negative for chest pain  Gastrointestinal: Negative for abdominal distention, abdominal pain, anal bleeding, blood in stool, constipation, diarrhea, nausea, rectal pain and vomiting  Neurological: Negative for weakness           Historical Information   Past Medical History:   Diagnosis Date    Bell's palsy     Colon polyp     Hypertension     Kidney calculi     Spinal stenosis      Past Surgical History:   Procedure Laterality Date    CHOLECYSTECTOMY      COLONOSCOPY      ENDOMETRIAL ABLATION      LAMINECTOMY  06/14/2019    Lumbar spine - With placement of Trial device nonfusion device     Social History   Social History     Substance and Sexual Activity   Alcohol Use Yes    Comment: occasionally     Social History     Substance and Sexual Activity   Drug Use No     Social History     Tobacco Use   Smoking Status Never Smoker   Smokeless Tobacco Never Used     Family History   Problem Relation Age of Onset    Dementia Mother     Heart disease Mother     Emphysema Father     Colon cancer Maternal Uncle        Meds/Allergies       Current Outpatient Medications:     acetaminophen (TYLENOL) 500 mg tablet    Ascorbic Acid (VITAMIN C PO)    cholecalciferol (VITAMIN D3) 1,000 units tablet    ketoconazole (NIZORAL) 2 % cream    mometasone (ELOCON) 0 1 % cream    sucralfate (CARAFATE) 1 g tablet    Allergies   Allergen Reactions    Ciprofloxacin     Lisinopril      Pt had one dose and became became presyncopal    Prednisone GI Intolerance    Tramadol            Objective     There were no vitals taken for this visit  There is no height or weight on file to calculate BMI  PHYSICAL EXAM:      General Appearance:   Alert, cooperative, no distress   HEENT:   Normocephalic, atraumatic, anicteric  Neck:  Supple, symmetrical, trachea midline   Lungs:   Clear to auscultation bilaterally; no rales, rhonchi or wheezing; respirations unlabored    Heart[de-identified]   Regular rate and rhythm; no murmur, rub, or gallop  Abdomen:   Soft, non-tender, non-distended; normal bowel sounds; no masses, no organomegaly    Genitalia:   Deferred    Rectal:   Deferred    Extremities:  No cyanosis, clubbing or edema    Skin:  No jaundice, rashes, or lesions             Lab Results:   No visits with results within 1 Day(s) from this visit     Latest known visit with results is:   Hospital Outpatient Visit on 07/27/2021   Component Date Value    Case Report 07/27/2021                      Value:Surgical Pathology Report                         Case: [id number] Authorizing Provider:  Rafael Benites MD           Collected:           07/27/2021 0841              Ordering Location:     [facility] Received:            07/27/2021 1048                                     Operating Room                                                               Pathologist:           Primitivo Harden MD                                                                Specimens:   A) - Stomach, gastric biopsies, rule out h pylori, retrieved with cold biopsy forcep                B) - Large Intestine, Cecum, cecal polyp, retrieved with cold snare                                 C) - Large Intestine, Transverse Colon, transverse colon polyp, retrieved with cold                 snare                                                                                      Final Diagnosis 07/27/2021                      Value: This result contains rich text formatting which cannot be displayed here   Additional Information 07/27/2021                      Value: This result contains rich text formatting which cannot be displayed here   Synoptic Checklist 07/27/2021                      Value:  (COLON/RECTUM POLYP FORM - GI - B)                                                                                                                 : Adenoma(s)     Armani Resendiz Description 07/27/2021                      Value: This result contains rich text formatting which cannot be displayed here  Radiology Results:   No results found

## 2021-10-11 ENCOUNTER — OFFICE VISIT (OUTPATIENT)
Dept: FAMILY MEDICINE CLINIC | Facility: CLINIC | Age: 66
End: 2021-10-11
Payer: MEDICARE

## 2021-10-11 ENCOUNTER — TELEPHONE (OUTPATIENT)
Dept: FAMILY MEDICINE CLINIC | Facility: CLINIC | Age: 66
End: 2021-10-11

## 2021-10-11 ENCOUNTER — APPOINTMENT (OUTPATIENT)
Dept: RADIOLOGY | Facility: MEDICAL CENTER | Age: 66
End: 2021-10-11
Payer: MEDICARE

## 2021-10-11 VITALS
BODY MASS INDEX: 32.72 KG/M2 | WEIGHT: 177.8 LBS | DIASTOLIC BLOOD PRESSURE: 86 MMHG | TEMPERATURE: 97.5 F | HEIGHT: 62 IN | SYSTOLIC BLOOD PRESSURE: 154 MMHG

## 2021-10-11 DIAGNOSIS — Z12.31 ENCOUNTER FOR SCREENING MAMMOGRAM FOR BREAST CANCER: ICD-10-CM

## 2021-10-11 DIAGNOSIS — R05.3 CHRONIC COUGH: ICD-10-CM

## 2021-10-11 DIAGNOSIS — K21.9 GASTROESOPHAGEAL REFLUX DISEASE, UNSPECIFIED WHETHER ESOPHAGITIS PRESENT: ICD-10-CM

## 2021-10-11 DIAGNOSIS — H69.82 CHRONIC EUSTACHIAN TUBE DYSFUNCTION, LEFT: Primary | ICD-10-CM

## 2021-10-11 PROCEDURE — 71046 X-RAY EXAM CHEST 2 VIEWS: CPT

## 2021-10-11 PROCEDURE — 99213 OFFICE O/P EST LOW 20 MIN: CPT | Performed by: FAMILY MEDICINE

## 2021-10-11 RX ORDER — AMOXICILLIN 500 MG/1
500 CAPSULE ORAL EVERY 8 HOURS SCHEDULED
Qty: 30 CAPSULE | Refills: 0 | Status: SHIPPED | OUTPATIENT
Start: 2021-10-11 | End: 2021-10-21

## 2021-10-11 RX ORDER — SUCRALFATE 1 G/1
1 TABLET ORAL DAILY
Qty: 30 TABLET | Refills: 3 | Status: SHIPPED | OUTPATIENT
Start: 2021-10-11 | End: 2021-11-09 | Stop reason: HOSPADM

## 2021-10-11 RX ORDER — METHYLPREDNISOLONE 4 MG/1
4 TABLET ORAL 2 TIMES DAILY WITH MEALS
Qty: 10 TABLET | Refills: 0 | Status: ON HOLD | OUTPATIENT
Start: 2021-10-11 | End: 2021-11-08 | Stop reason: ALTCHOICE

## 2021-10-18 ENCOUNTER — TELEPHONE (OUTPATIENT)
Dept: FAMILY MEDICINE CLINIC | Facility: CLINIC | Age: 66
End: 2021-10-18

## 2021-10-27 ENCOUNTER — TELEPHONE (OUTPATIENT)
Dept: FAMILY MEDICINE CLINIC | Facility: CLINIC | Age: 66
End: 2021-10-27

## 2021-11-06 ENCOUNTER — APPOINTMENT (EMERGENCY)
Dept: RADIOLOGY | Facility: HOSPITAL | Age: 66
DRG: 177 | End: 2021-11-06
Payer: MEDICARE

## 2021-11-06 ENCOUNTER — HOSPITAL ENCOUNTER (INPATIENT)
Facility: HOSPITAL | Age: 66
LOS: 3 days | Discharge: HOME | DRG: 177 | End: 2021-11-09
Attending: EMERGENCY MEDICINE | Admitting: INTERNAL MEDICINE
Payer: MEDICARE

## 2021-11-06 DIAGNOSIS — U07.1 COVID-19: Primary | ICD-10-CM

## 2021-11-06 PROBLEM — J96.01 ACUTE RESPIRATORY FAILURE WITH HYPOXIA (HCC): Status: ACTIVE | Noted: 2021-11-06

## 2021-11-06 LAB
ALBUMIN SERPL BCP-MCNC: 3.7 G/DL (ref 3.5–5.7)
ALP SERPL-CCNC: 60 U/L (ref 55–165)
ALT SERPL W P-5'-P-CCNC: 49 U/L (ref 7–52)
ANION GAP SERPL CALCULATED.3IONS-SCNC: 12 MMOL/L (ref 4–13)
APTT PPP: 34 SECONDS (ref 23–37)
AST SERPL W P-5'-P-CCNC: 67 U/L (ref 13–39)
BASOPHILS # BLD AUTO: 0 THOUSANDS/ΜL (ref 0–0.1)
BASOPHILS NFR BLD AUTO: 0 % (ref 0–2)
BILIRUB SERPL-MCNC: 0.5 MG/DL (ref 0.2–1)
BUN SERPL-MCNC: 19 MG/DL (ref 7–25)
CALCIUM SERPL-MCNC: 9.7 MG/DL (ref 8.6–10.5)
CHLORIDE SERPL-SCNC: 103 MMOL/L (ref 98–107)
CO2 SERPL-SCNC: 24 MMOL/L (ref 21–31)
CREAT SERPL-MCNC: 0.67 MG/DL (ref 0.6–1.2)
CRP SERPL QL: 55.7 MG/L
D DIMER PPP FEU-MCNC: 0.81 UG/ML FEU
EOSINOPHIL # BLD AUTO: 0 THOUSAND/ΜL (ref 0–0.61)
EOSINOPHIL NFR BLD AUTO: 1 % (ref 0–5)
ERYTHROCYTE [DISTWIDTH] IN BLOOD BY AUTOMATED COUNT: 11.7 % (ref 11.5–14.5)
GFR SERPL CREATININE-BSD FRML MDRD: 92 ML/MIN/1.73SQ M
GLUCOSE SERPL-MCNC: 101 MG/DL (ref 65–99)
HCT VFR BLD AUTO: 46.2 % (ref 42–47)
HGB BLD-MCNC: 15.8 G/DL (ref 12–16)
INR PPP: 0.98 (ref 0.84–1.19)
LACTATE SERPL-SCNC: 1.7 MMOL/L (ref 0.5–2)
LYMPHOCYTES # BLD AUTO: 1.3 THOUSANDS/ΜL (ref 0.6–4.47)
LYMPHOCYTES NFR BLD AUTO: 23 % (ref 21–51)
MCH RBC QN AUTO: 31.4 PG (ref 26–34)
MCHC RBC AUTO-ENTMCNC: 34.2 G/DL (ref 31–37)
MCV RBC AUTO: 92 FL (ref 81–99)
MONOCYTES # BLD AUTO: 0.6 THOUSAND/ΜL (ref 0.17–1.22)
MONOCYTES NFR BLD AUTO: 12 % (ref 2–12)
NEUTROPHILS # BLD AUTO: 3.6 THOUSANDS/ΜL (ref 1.4–6.5)
NEUTS SEG NFR BLD AUTO: 65 % (ref 42–75)
PLATELET # BLD AUTO: 415 THOUSANDS/UL (ref 149–390)
PMV BLD AUTO: 6.9 FL (ref 8.6–11.7)
POTASSIUM SERPL-SCNC: 3.8 MMOL/L (ref 3.5–5.5)
PROT SERPL-MCNC: 7.6 G/DL (ref 6.4–8.9)
PROTHROMBIN TIME: 13.1 SECONDS (ref 11.6–14.5)
RBC # BLD AUTO: 5.03 MILLION/UL (ref 3.9–5.2)
SODIUM SERPL-SCNC: 139 MMOL/L (ref 134–143)
TROPONIN I SERPL-MCNC: <0.03 NG/ML
WBC # BLD AUTO: 5.6 THOUSAND/UL (ref 4.8–10.8)

## 2021-11-06 PROCEDURE — 85379 FIBRIN DEGRADATION QUANT: CPT | Performed by: PHYSICIAN ASSISTANT

## 2021-11-06 PROCEDURE — 84145 PROCALCITONIN (PCT): CPT | Performed by: PHYSICIAN ASSISTANT

## 2021-11-06 PROCEDURE — 87040 BLOOD CULTURE FOR BACTERIA: CPT | Performed by: PHYSICIAN ASSISTANT

## 2021-11-06 PROCEDURE — 96374 THER/PROPH/DIAG INJ IV PUSH: CPT

## 2021-11-06 PROCEDURE — 85610 PROTHROMBIN TIME: CPT | Performed by: PHYSICIAN ASSISTANT

## 2021-11-06 PROCEDURE — 99222 1ST HOSP IP/OBS MODERATE 55: CPT | Performed by: INTERNAL MEDICINE

## 2021-11-06 PROCEDURE — 84484 ASSAY OF TROPONIN QUANT: CPT | Performed by: PHYSICIAN ASSISTANT

## 2021-11-06 PROCEDURE — 99285 EMERGENCY DEPT VISIT HI MDM: CPT

## 2021-11-06 PROCEDURE — 71045 X-RAY EXAM CHEST 1 VIEW: CPT

## 2021-11-06 PROCEDURE — 36415 COLL VENOUS BLD VENIPUNCTURE: CPT | Performed by: PHYSICIAN ASSISTANT

## 2021-11-06 PROCEDURE — 80053 COMPREHEN METABOLIC PANEL: CPT | Performed by: PHYSICIAN ASSISTANT

## 2021-11-06 PROCEDURE — 83605 ASSAY OF LACTIC ACID: CPT | Performed by: PHYSICIAN ASSISTANT

## 2021-11-06 PROCEDURE — 1124F ACP DISCUSS-NO DSCNMKR DOCD: CPT | Performed by: INTERNAL MEDICINE

## 2021-11-06 PROCEDURE — 99285 EMERGENCY DEPT VISIT HI MDM: CPT | Performed by: PHYSICIAN ASSISTANT

## 2021-11-06 PROCEDURE — 86140 C-REACTIVE PROTEIN: CPT | Performed by: PHYSICIAN ASSISTANT

## 2021-11-06 PROCEDURE — XW0DXM6 INTRODUCTION OF BARICITINIB INTO MOUTH AND PHARYNX, EXTERNAL APPROACH, NEW TECHNOLOGY GROUP 6: ICD-10-PCS | Performed by: INTERNAL MEDICINE

## 2021-11-06 PROCEDURE — XW033E5 INTRODUCTION OF REMDESIVIR ANTI-INFECTIVE INTO PERIPHERAL VEIN, PERCUTANEOUS APPROACH, NEW TECHNOLOGY GROUP 5: ICD-10-PCS | Performed by: INTERNAL MEDICINE

## 2021-11-06 PROCEDURE — 85730 THROMBOPLASTIN TIME PARTIAL: CPT | Performed by: PHYSICIAN ASSISTANT

## 2021-11-06 PROCEDURE — 93005 ELECTROCARDIOGRAM TRACING: CPT

## 2021-11-06 PROCEDURE — 85025 COMPLETE CBC W/AUTO DIFF WBC: CPT | Performed by: PHYSICIAN ASSISTANT

## 2021-11-06 RX ORDER — DEXAMETHASONE SODIUM PHOSPHATE 4 MG/ML
8 INJECTION, SOLUTION INTRA-ARTICULAR; INTRALESIONAL; INTRAMUSCULAR; INTRAVENOUS; SOFT TISSUE ONCE
Status: COMPLETED | OUTPATIENT
Start: 2021-11-06 | End: 2021-11-06

## 2021-11-06 RX ORDER — DEXAMETHASONE SODIUM PHOSPHATE 4 MG/ML
6 INJECTION, SOLUTION INTRA-ARTICULAR; INTRALESIONAL; INTRAMUSCULAR; INTRAVENOUS; SOFT TISSUE EVERY 24 HOURS
Status: DISCONTINUED | OUTPATIENT
Start: 2021-11-07 | End: 2021-11-09

## 2021-11-06 RX ORDER — ONDANSETRON 2 MG/ML
4 INJECTION INTRAMUSCULAR; INTRAVENOUS EVERY 6 HOURS PRN
Status: DISCONTINUED | OUTPATIENT
Start: 2021-11-06 | End: 2021-11-09 | Stop reason: HOSPADM

## 2021-11-06 RX ORDER — POLYETHYLENE GLYCOL 3350 17 G/17G
17 POWDER, FOR SOLUTION ORAL DAILY PRN
Status: DISCONTINUED | OUTPATIENT
Start: 2021-11-06 | End: 2021-11-09 | Stop reason: HOSPADM

## 2021-11-06 RX ORDER — KETOROLAC TROMETHAMINE 30 MG/ML
15 INJECTION, SOLUTION INTRAMUSCULAR; INTRAVENOUS EVERY 6 HOURS PRN
Status: DISCONTINUED | OUTPATIENT
Start: 2021-11-06 | End: 2021-11-09

## 2021-11-06 RX ORDER — CALCIUM CARBONATE 200(500)MG
1000 TABLET,CHEWABLE ORAL DAILY PRN
Status: DISCONTINUED | OUTPATIENT
Start: 2021-11-06 | End: 2021-11-09 | Stop reason: HOSPADM

## 2021-11-06 RX ORDER — ACETAMINOPHEN 325 MG/1
650 TABLET ORAL EVERY 6 HOURS PRN
Status: DISCONTINUED | OUTPATIENT
Start: 2021-11-06 | End: 2021-11-09 | Stop reason: HOSPADM

## 2021-11-06 RX ORDER — ATORVASTATIN CALCIUM 40 MG/1
40 TABLET, FILM COATED ORAL
Status: DISCONTINUED | OUTPATIENT
Start: 2021-11-06 | End: 2021-11-09

## 2021-11-06 RX ORDER — SUCRALFATE 1 G/1
1 TABLET ORAL DAILY
Status: DISCONTINUED | OUTPATIENT
Start: 2021-11-07 | End: 2021-11-06

## 2021-11-06 RX ORDER — SUCRALFATE 1 G/1
1 TABLET ORAL
Status: DISCONTINUED | OUTPATIENT
Start: 2021-11-06 | End: 2021-11-08

## 2021-11-06 RX ADMIN — ENOXAPARIN SODIUM 30 MG: 30 INJECTION SUBCUTANEOUS at 19:54

## 2021-11-06 RX ADMIN — SUCRALFATE 1 G: 1 TABLET ORAL at 21:30

## 2021-11-06 RX ADMIN — KETOROLAC TROMETHAMINE 15 MG: 30 INJECTION, SOLUTION INTRAMUSCULAR at 19:53

## 2021-11-07 LAB
ALBUMIN SERPL BCP-MCNC: 3.2 G/DL (ref 3.5–5.7)
ALP SERPL-CCNC: 55 U/L (ref 55–165)
ALT SERPL W P-5'-P-CCNC: 40 U/L (ref 7–52)
ANION GAP SERPL CALCULATED.3IONS-SCNC: 7 MMOL/L (ref 4–13)
AST SERPL W P-5'-P-CCNC: 41 U/L (ref 13–39)
ATRIAL RATE: 91 BPM
BILIRUB SERPL-MCNC: 0.5 MG/DL (ref 0.2–1)
BUN SERPL-MCNC: 20 MG/DL (ref 7–25)
CALCIUM ALBUM COR SERPL-MCNC: 9.9 MG/DL (ref 8.3–10.1)
CALCIUM SERPL-MCNC: 9.3 MG/DL (ref 8.6–10.5)
CHLORIDE SERPL-SCNC: 107 MMOL/L (ref 98–107)
CO2 SERPL-SCNC: 23 MMOL/L (ref 21–31)
CREAT SERPL-MCNC: 0.5 MG/DL (ref 0.6–1.2)
CRP SERPL QL: 47.7 MG/L
ERYTHROCYTE [DISTWIDTH] IN BLOOD BY AUTOMATED COUNT: 11.9 % (ref 11.5–14.5)
GFR SERPL CREATININE-BSD FRML MDRD: 101 ML/MIN/1.73SQ M
GLUCOSE SERPL-MCNC: 159 MG/DL (ref 65–99)
HCT VFR BLD AUTO: 41.9 % (ref 42–47)
HGB BLD-MCNC: 14.2 G/DL (ref 12–16)
LYMPHOCYTES # BLD AUTO: 0.73 THOUSAND/UL (ref 0.6–4.47)
LYMPHOCYTES # BLD AUTO: 26 % (ref 20–51)
MCH RBC QN AUTO: 30.9 PG (ref 26–34)
MCHC RBC AUTO-ENTMCNC: 34 G/DL (ref 31–37)
MCV RBC AUTO: 91 FL (ref 81–99)
MONOCYTES # BLD AUTO: 0.2 THOUSAND/UL (ref 0–1.22)
MONOCYTES NFR BLD AUTO: 7 % (ref 4–12)
NEUTS BAND NFR BLD MANUAL: 1 % (ref 0–8)
NEUTS SEG # BLD: 1.88 THOUSAND/UL (ref 1.81–6.82)
NEUTS SEG NFR BLD AUTO: 66 % (ref 42–75)
P AXIS: 44 DEGREES
PLATELET # BLD AUTO: 366 THOUSANDS/UL (ref 149–390)
PLATELET BLD QL SMEAR: ADEQUATE
PMV BLD AUTO: 7.2 FL (ref 8.6–11.7)
POTASSIUM SERPL-SCNC: 4.1 MMOL/L (ref 3.5–5.5)
PR INTERVAL: 164 MS
PROCALCITONIN SERPL-MCNC: <0.05 NG/ML
PROCALCITONIN SERPL-MCNC: <0.05 NG/ML
PROT SERPL-MCNC: 6.7 G/DL (ref 6.4–8.9)
QRS AXIS: 37 DEGREES
QRSD INTERVAL: 80 MS
QT INTERVAL: 360 MS
QTC INTERVAL: 442 MS
RBC # BLD AUTO: 4.61 MILLION/UL (ref 3.9–5.2)
RBC MORPH BLD: NORMAL
SODIUM SERPL-SCNC: 137 MMOL/L (ref 134–143)
T WAVE AXIS: 17 DEGREES
TOTAL CELLS COUNTED SPEC: 100
VENTRICULAR RATE: 91 BPM
WBC # BLD AUTO: 2.8 THOUSAND/UL (ref 4.8–10.8)

## 2021-11-07 PROCEDURE — 84145 PROCALCITONIN (PCT): CPT | Performed by: PHYSICIAN ASSISTANT

## 2021-11-07 PROCEDURE — 97163 PT EVAL HIGH COMPLEX 45 MIN: CPT

## 2021-11-07 PROCEDURE — 85027 COMPLETE CBC AUTOMATED: CPT | Performed by: INTERNAL MEDICINE

## 2021-11-07 PROCEDURE — 80053 COMPREHEN METABOLIC PANEL: CPT | Performed by: INTERNAL MEDICINE

## 2021-11-07 PROCEDURE — 85007 BL SMEAR W/DIFF WBC COUNT: CPT | Performed by: INTERNAL MEDICINE

## 2021-11-07 PROCEDURE — 99232 SBSQ HOSP IP/OBS MODERATE 35: CPT | Performed by: INTERNAL MEDICINE

## 2021-11-07 PROCEDURE — 36415 COLL VENOUS BLD VENIPUNCTURE: CPT | Performed by: INTERNAL MEDICINE

## 2021-11-07 PROCEDURE — 97166 OT EVAL MOD COMPLEX 45 MIN: CPT

## 2021-11-07 PROCEDURE — 86140 C-REACTIVE PROTEIN: CPT | Performed by: INTERNAL MEDICINE

## 2021-11-07 PROCEDURE — 93010 ELECTROCARDIOGRAM REPORT: CPT | Performed by: INTERNAL MEDICINE

## 2021-11-07 RX ADMIN — SUCRALFATE 1 G: 1 TABLET ORAL at 16:47

## 2021-11-07 RX ADMIN — ENOXAPARIN SODIUM 30 MG: 30 INJECTION SUBCUTANEOUS at 20:28

## 2021-11-07 RX ADMIN — KETOROLAC TROMETHAMINE 15 MG: 30 INJECTION, SOLUTION INTRAMUSCULAR at 17:49

## 2021-11-07 RX ADMIN — BARICITINIB 4 MG: 2 TABLET, FILM COATED ORAL at 11:44

## 2021-11-07 RX ADMIN — ENOXAPARIN SODIUM 30 MG: 30 INJECTION SUBCUTANEOUS at 10:39

## 2021-11-07 RX ADMIN — DEXAMETHASONE SODIUM PHOSPHATE 6 MG: 4 INJECTION, SOLUTION INTRAMUSCULAR; INTRAVENOUS at 20:28

## 2021-11-07 RX ADMIN — REMDESIVIR 200 MG: 100 INJECTION, POWDER, LYOPHILIZED, FOR SOLUTION INTRAVENOUS at 12:00

## 2021-11-07 RX ADMIN — SUCRALFATE 1 G: 1 TABLET ORAL at 06:49

## 2021-11-08 LAB
ANION GAP SERPL CALCULATED.3IONS-SCNC: 8 MMOL/L (ref 4–13)
BASOPHILS # BLD AUTO: 0 THOUSANDS/ΜL (ref 0–0.1)
BASOPHILS NFR BLD AUTO: 1 % (ref 0–2)
BILIRUB UR QL STRIP: NEGATIVE
BUN SERPL-MCNC: 27 MG/DL (ref 7–25)
CALCIUM SERPL-MCNC: 9.3 MG/DL (ref 8.6–10.5)
CHLORIDE SERPL-SCNC: 107 MMOL/L (ref 98–107)
CLARITY UR: CLEAR
CO2 SERPL-SCNC: 24 MMOL/L (ref 21–31)
COLOR UR: YELLOW
CREAT SERPL-MCNC: 0.62 MG/DL (ref 0.6–1.2)
CRP SERPL QL: 26.9 MG/L
EOSINOPHIL # BLD AUTO: 0 THOUSAND/ΜL (ref 0–0.61)
EOSINOPHIL NFR BLD AUTO: 0 % (ref 0–5)
ERYTHROCYTE [DISTWIDTH] IN BLOOD BY AUTOMATED COUNT: 11.9 % (ref 11.5–14.5)
GFR SERPL CREATININE-BSD FRML MDRD: 94 ML/MIN/1.73SQ M
GLUCOSE SERPL-MCNC: 148 MG/DL (ref 65–99)
GLUCOSE UR STRIP-MCNC: NEGATIVE MG/DL
HCT VFR BLD AUTO: 40.7 % (ref 42–47)
HGB BLD-MCNC: 13.8 G/DL (ref 12–16)
HGB UR QL STRIP.AUTO: NEGATIVE
KETONES UR STRIP-MCNC: NEGATIVE MG/DL
LEUKOCYTE ESTERASE UR QL STRIP: NEGATIVE
LYMPHOCYTES # BLD AUTO: 0.8 THOUSANDS/ΜL (ref 0.6–4.47)
LYMPHOCYTES NFR BLD AUTO: 16 % (ref 21–51)
MCH RBC QN AUTO: 31 PG (ref 26–34)
MCHC RBC AUTO-ENTMCNC: 34 G/DL (ref 31–37)
MCV RBC AUTO: 91 FL (ref 81–99)
MONOCYTES # BLD AUTO: 0.5 THOUSAND/ΜL (ref 0.17–1.22)
MONOCYTES NFR BLD AUTO: 9 % (ref 2–12)
NEUTROPHILS # BLD AUTO: 3.9 THOUSANDS/ΜL (ref 1.4–6.5)
NEUTS SEG NFR BLD AUTO: 75 % (ref 42–75)
NITRITE UR QL STRIP: NEGATIVE
PH UR STRIP.AUTO: 6.5 [PH]
PLATELET # BLD AUTO: 483 THOUSANDS/UL (ref 149–390)
PMV BLD AUTO: 7.4 FL (ref 8.6–11.7)
POTASSIUM SERPL-SCNC: 4.2 MMOL/L (ref 3.5–5.5)
PROT UR STRIP-MCNC: NEGATIVE MG/DL
RBC # BLD AUTO: 4.46 MILLION/UL (ref 3.9–5.2)
SODIUM SERPL-SCNC: 139 MMOL/L (ref 134–143)
SP GR UR STRIP.AUTO: 1.02 (ref 1–1.03)
UROBILINOGEN UR QL STRIP.AUTO: 0.2 E.U./DL
WBC # BLD AUTO: 5.2 THOUSAND/UL (ref 4.8–10.8)

## 2021-11-08 PROCEDURE — 85025 COMPLETE CBC W/AUTO DIFF WBC: CPT | Performed by: INTERNAL MEDICINE

## 2021-11-08 PROCEDURE — 99232 SBSQ HOSP IP/OBS MODERATE 35: CPT | Performed by: HOSPITALIST

## 2021-11-08 PROCEDURE — 81003 URINALYSIS AUTO W/O SCOPE: CPT | Performed by: PHYSICIAN ASSISTANT

## 2021-11-08 PROCEDURE — 80048 BASIC METABOLIC PNL TOTAL CA: CPT | Performed by: INTERNAL MEDICINE

## 2021-11-08 PROCEDURE — 86140 C-REACTIVE PROTEIN: CPT | Performed by: INTERNAL MEDICINE

## 2021-11-08 RX ADMIN — ENOXAPARIN SODIUM 30 MG: 30 INJECTION SUBCUTANEOUS at 08:26

## 2021-11-08 RX ADMIN — DEXAMETHASONE SODIUM PHOSPHATE 6 MG: 4 INJECTION, SOLUTION INTRAMUSCULAR; INTRAVENOUS at 18:08

## 2021-11-08 RX ADMIN — BARICITINIB 4 MG: 2 TABLET, FILM COATED ORAL at 12:31

## 2021-11-08 RX ADMIN — SUCRALFATE 1 G: 1 TABLET ORAL at 10:48

## 2021-11-08 RX ADMIN — SUCRALFATE 1 G: 1 TABLET ORAL at 08:26

## 2021-11-08 RX ADMIN — REMDESIVIR 100 MG: 100 INJECTION, POWDER, LYOPHILIZED, FOR SOLUTION INTRAVENOUS at 11:14

## 2021-11-08 RX ADMIN — ATORVASTATIN CALCIUM 40 MG: 40 TABLET, FILM COATED ORAL at 21:48

## 2021-11-08 RX ADMIN — ENOXAPARIN SODIUM 30 MG: 30 INJECTION SUBCUTANEOUS at 21:48

## 2021-11-08 RX ADMIN — CALCIUM CARBONATE (ANTACID) CHEW TAB 500 MG 1000 MG: 500 CHEW TAB at 08:26

## 2021-11-09 VITALS
RESPIRATION RATE: 18 BRPM | TEMPERATURE: 97.6 F | WEIGHT: 162.37 LBS | SYSTOLIC BLOOD PRESSURE: 150 MMHG | BODY MASS INDEX: 29.7 KG/M2 | OXYGEN SATURATION: 91 % | HEART RATE: 80 BPM | DIASTOLIC BLOOD PRESSURE: 76 MMHG

## 2021-11-09 LAB
ALBUMIN SERPL BCP-MCNC: 3.2 G/DL (ref 3.5–5.7)
ALP SERPL-CCNC: 51 U/L (ref 55–165)
ALT SERPL W P-5'-P-CCNC: 32 U/L (ref 7–52)
ANION GAP SERPL CALCULATED.3IONS-SCNC: 9 MMOL/L (ref 4–13)
AST SERPL W P-5'-P-CCNC: 21 U/L (ref 13–39)
BASOPHILS # BLD AUTO: 0.1 THOUSANDS/ΜL (ref 0–0.1)
BASOPHILS NFR BLD AUTO: 1 % (ref 0–2)
BILIRUB SERPL-MCNC: 0.5 MG/DL (ref 0.2–1)
BUN SERPL-MCNC: 28 MG/DL (ref 7–25)
CALCIUM ALBUM COR SERPL-MCNC: 9.8 MG/DL (ref 8.3–10.1)
CALCIUM SERPL-MCNC: 9.2 MG/DL (ref 8.6–10.5)
CHLORIDE SERPL-SCNC: 106 MMOL/L (ref 98–107)
CO2 SERPL-SCNC: 24 MMOL/L (ref 21–31)
CREAT SERPL-MCNC: 0.58 MG/DL (ref 0.6–1.2)
CRP SERPL QL: 11.4 MG/L
EOSINOPHIL # BLD AUTO: 0 THOUSAND/ΜL (ref 0–0.61)
EOSINOPHIL NFR BLD AUTO: 0 % (ref 0–5)
ERYTHROCYTE [DISTWIDTH] IN BLOOD BY AUTOMATED COUNT: 12 % (ref 11.5–14.5)
GFR SERPL CREATININE-BSD FRML MDRD: 96 ML/MIN/1.73SQ M
GLUCOSE SERPL-MCNC: 152 MG/DL (ref 65–99)
HCT VFR BLD AUTO: 41.5 % (ref 42–47)
HGB BLD-MCNC: 13.8 G/DL (ref 12–16)
LYMPHOCYTES # BLD AUTO: 1 THOUSANDS/ΜL (ref 0.6–4.47)
LYMPHOCYTES NFR BLD AUTO: 14 % (ref 21–51)
MCH RBC QN AUTO: 30.6 PG (ref 26–34)
MCHC RBC AUTO-ENTMCNC: 33.2 G/DL (ref 31–37)
MCV RBC AUTO: 92 FL (ref 81–99)
MONOCYTES # BLD AUTO: 0.6 THOUSAND/ΜL (ref 0.17–1.22)
MONOCYTES NFR BLD AUTO: 8 % (ref 2–12)
NEUTROPHILS # BLD AUTO: 5.3 THOUSANDS/ΜL (ref 1.4–6.5)
NEUTS SEG NFR BLD AUTO: 77 % (ref 42–75)
PLATELET # BLD AUTO: 504 THOUSANDS/UL (ref 149–390)
PMV BLD AUTO: 7.9 FL (ref 8.6–11.7)
POTASSIUM SERPL-SCNC: 4.6 MMOL/L (ref 3.5–5.5)
PROT SERPL-MCNC: 6.6 G/DL (ref 6.4–8.9)
RBC # BLD AUTO: 4.51 MILLION/UL (ref 3.9–5.2)
SODIUM SERPL-SCNC: 139 MMOL/L (ref 134–143)
WBC # BLD AUTO: 6.9 THOUSAND/UL (ref 4.8–10.8)

## 2021-11-09 PROCEDURE — 99239 HOSP IP/OBS DSCHRG MGMT >30: CPT | Performed by: HOSPITALIST

## 2021-11-09 PROCEDURE — 85025 COMPLETE CBC W/AUTO DIFF WBC: CPT | Performed by: HOSPITALIST

## 2021-11-09 PROCEDURE — 86140 C-REACTIVE PROTEIN: CPT | Performed by: HOSPITALIST

## 2021-11-09 PROCEDURE — 80053 COMPREHEN METABOLIC PANEL: CPT | Performed by: HOSPITALIST

## 2021-11-09 RX ORDER — ATORVASTATIN CALCIUM 40 MG/1
40 TABLET, FILM COATED ORAL
Qty: 10 TABLET | Refills: 0 | Status: SHIPPED | OUTPATIENT
Start: 2021-11-09 | End: 2021-11-19

## 2021-11-09 RX ORDER — ATORVASTATIN CALCIUM 40 MG/1
40 TABLET, FILM COATED ORAL
Status: DISCONTINUED | OUTPATIENT
Start: 2021-11-09 | End: 2021-11-09 | Stop reason: HOSPADM

## 2021-11-09 RX ADMIN — ATORVASTATIN CALCIUM 40 MG: 40 TABLET, FILM COATED ORAL at 16:00

## 2021-11-09 RX ADMIN — ENOXAPARIN SODIUM 30 MG: 30 INJECTION SUBCUTANEOUS at 10:00

## 2021-11-09 RX ADMIN — BARICITINIB 4 MG: 2 TABLET, FILM COATED ORAL at 10:00

## 2021-11-09 RX ADMIN — REMDESIVIR 100 MG: 100 INJECTION, POWDER, LYOPHILIZED, FOR SOLUTION INTRAVENOUS at 12:30

## 2021-11-10 ENCOUNTER — TRANSITIONAL CARE MANAGEMENT (OUTPATIENT)
Dept: FAMILY MEDICINE CLINIC | Facility: CLINIC | Age: 66
End: 2021-11-10

## 2021-11-12 ENCOUNTER — TELEMEDICINE (OUTPATIENT)
Dept: FAMILY MEDICINE CLINIC | Facility: CLINIC | Age: 66
End: 2021-11-12
Payer: MEDICARE

## 2021-11-12 VITALS — HEIGHT: 62 IN | BODY MASS INDEX: 29.81 KG/M2 | WEIGHT: 162 LBS

## 2021-11-12 DIAGNOSIS — U07.1 COVID: Primary | ICD-10-CM

## 2021-11-12 LAB
BACTERIA BLD CULT: NORMAL
BACTERIA BLD CULT: NORMAL

## 2021-11-12 PROCEDURE — 99442 PR PHYS/QHP TELEPHONE EVALUATION 11-20 MIN: CPT | Performed by: FAMILY MEDICINE

## 2021-12-30 ENCOUNTER — TELEPHONE (OUTPATIENT)
Dept: FAMILY MEDICINE CLINIC | Facility: CLINIC | Age: 66
End: 2021-12-30

## 2022-01-11 ENCOUNTER — OFFICE VISIT (OUTPATIENT)
Dept: FAMILY MEDICINE CLINIC | Facility: CLINIC | Age: 67
End: 2022-01-11
Payer: MEDICARE

## 2022-01-11 VITALS
TEMPERATURE: 97.6 F | WEIGHT: 162 LBS | DIASTOLIC BLOOD PRESSURE: 96 MMHG | SYSTOLIC BLOOD PRESSURE: 162 MMHG | BODY MASS INDEX: 29.81 KG/M2 | HEIGHT: 62 IN

## 2022-01-11 DIAGNOSIS — N28.1 ACQUIRED RENAL CYST OF RIGHT KIDNEY: ICD-10-CM

## 2022-01-11 DIAGNOSIS — K59.04 CHRONIC IDIOPATHIC CONSTIPATION: Primary | ICD-10-CM

## 2022-01-11 DIAGNOSIS — N28.1 COMPLEX RENAL CYST: Primary | ICD-10-CM

## 2022-01-11 PROCEDURE — 99214 OFFICE O/P EST MOD 30 MIN: CPT | Performed by: FAMILY MEDICINE

## 2022-01-11 NOTE — PROGRESS NOTES
Assessment/Plan:    Problem List Items Addressed This Visit        Other    Constipation - Primary      Other Visit Diagnoses     Acquired renal cyst of right kidney        Relevant Orders    US Limited Kidney           Diagnoses and all orders for this visit:    Chronic idiopathic constipation    Acquired renal cyst of right kidney  -     US Limited Kidney; Future    Other orders  -     Probiotic Product (PROBIOTIC PO); Take by mouth in the morning        No problem-specific Assessment & Plan notes found for this encounter  Subjective:      Patient ID: Milli Taylor is a 77 y o  female  Mrs  Be go here she is on a ketogenic diet with literally no fiber she has a bit concerned because she has not had a bowel movement a few days and she feels pressure in her rectal area on her abdomen is soft and nontender and no signs of any bowel obstruction and her rectal exam is totally normal I asked her to on so add soluble fiber to her diet and drink double the amount of water or liquid that she is currently drinking      The following portions of the patient's history were reviewed and updated as appropriate:   She has a past medical history of Bell's palsy, Colon polyp, Hypertension, Kidney calculi, and Spinal stenosis  ,  does not have any pertinent problems on file  ,   has a past surgical history that includes Cholecystectomy; Endometrial ablation; Laminectomy (06/14/2019); and Colonoscopy  ,  family history includes Colon cancer in her maternal uncle; Dementia in her mother; Emphysema in her father; Heart disease in her mother  ,   reports that she has never smoked  She has never used smokeless tobacco  She reports current alcohol use  She reports that she does not use drugs  ,  is allergic to tramadol, ciprofloxacin, lisinopril, and prednisone     Current Outpatient Medications   Medication Sig Dispense Refill    Ascorbic Acid (VITAMIN C PO) Take 500 mg by mouth daily        cholecalciferol (VITAMIN D3) 1,000 units tablet Take 5,000 Units by mouth daily        Probiotic Product (PROBIOTIC PO) Take by mouth in the morning      atorvastatin (LIPITOR) 40 mg tablet Take 1 tablet (40 mg total) by mouth daily with dinner for 10 days 10 tablet 0     No current facility-administered medications for this visit  Review of Systems   Constitutional: Negative for activity change, appetite change, diaphoresis, fatigue and fever  HENT: Negative  Eyes: Negative  Respiratory: Negative for apnea, cough, chest tightness, shortness of breath and wheezing  Cardiovascular: Negative for chest pain, palpitations and leg swelling  Gastrointestinal: Positive for constipation  Negative for abdominal distention, abdominal pain, anal bleeding, diarrhea, nausea and vomiting  Endocrine: Negative for cold intolerance, heat intolerance, polydipsia, polyphagia and polyuria  Genitourinary: Negative for difficulty urinating, dysuria, flank pain, hematuria and urgency  Musculoskeletal: Negative for arthralgias, back pain, gait problem, joint swelling and myalgias  Skin: Negative for color change, rash and wound  Allergic/Immunologic: Negative for environmental allergies, food allergies and immunocompromised state  Neurological: Negative for dizziness, seizures, syncope, speech difficulty, numbness and headaches  Hematological: Negative for adenopathy  Does not bruise/bleed easily  Psychiatric/Behavioral: Negative for agitation, behavioral problems, hallucinations, sleep disturbance and suicidal ideas  Objective:  Vitals:    01/11/22 1550   BP: 162/96   BP Location: Left arm   Patient Position: Sitting   Cuff Size: Standard   Temp: 97 6 °F (36 4 °C)   TempSrc: Temporal   Weight: 73 5 kg (162 lb)   Height: 5' 2" (1 575 m)     Body mass index is 29 63 kg/m²  Physical Exam  Constitutional:       General: She is not in acute distress  Appearance: She is well-developed  She is not diaphoretic     HENT: Head: Normocephalic  Right Ear: External ear normal       Left Ear: External ear normal       Nose: Nose normal    Eyes:      General: No scleral icterus  Right eye: No discharge  Left eye: No discharge  Conjunctiva/sclera: Conjunctivae normal       Pupils: Pupils are equal, round, and reactive to light  Neck:      Thyroid: No thyromegaly  Trachea: No tracheal deviation  Cardiovascular:      Rate and Rhythm: Normal rate and regular rhythm  Heart sounds: Normal heart sounds  No murmur heard  No friction rub  No gallop  Pulmonary:      Effort: Pulmonary effort is normal  No respiratory distress  Breath sounds: Normal breath sounds  No wheezing  Abdominal:      General: Bowel sounds are normal       Palpations: Abdomen is soft  There is no mass  Tenderness: There is no abdominal tenderness  There is no guarding  Genitourinary:     Rectum: Normal    Musculoskeletal:         General: No deformity  Cervical back: Normal range of motion  Lymphadenopathy:      Cervical: No cervical adenopathy  Skin:     General: Skin is warm and dry  Findings: No erythema or rash  Neurological:      Mental Status: She is alert and oriented to person, place, and time  Cranial Nerves: No cranial nerve deficit  Psychiatric:         Thought Content:  Thought content normal

## 2022-06-24 ENCOUNTER — HOSPITAL ENCOUNTER (EMERGENCY)
Facility: HOSPITAL | Age: 67
Discharge: HOME | End: 2022-06-24
Attending: EMERGENCY MEDICINE
Payer: MEDICARE

## 2022-06-24 ENCOUNTER — APPOINTMENT (EMERGENCY)
Dept: CT IMAGING | Facility: HOSPITAL | Age: 67
End: 2022-06-24
Payer: MEDICARE

## 2022-06-24 VITALS
RESPIRATION RATE: 16 BRPM | OXYGEN SATURATION: 97 % | HEART RATE: 68 BPM | WEIGHT: 162 LBS | BODY MASS INDEX: 29.63 KG/M2 | TEMPERATURE: 97.9 F | DIASTOLIC BLOOD PRESSURE: 72 MMHG | SYSTOLIC BLOOD PRESSURE: 127 MMHG

## 2022-06-24 DIAGNOSIS — R10.9 ABDOMINAL PAIN: Primary | ICD-10-CM

## 2022-06-24 DIAGNOSIS — K29.70 GASTRITIS: ICD-10-CM

## 2022-06-24 LAB
ALBUMIN SERPL BCP-MCNC: 4.2 G/DL (ref 3.5–5)
ALP SERPL-CCNC: 76 U/L (ref 34–104)
ALT SERPL W P-5'-P-CCNC: 15 U/L (ref 7–52)
ANION GAP SERPL CALCULATED.3IONS-SCNC: 9 MMOL/L (ref 4–13)
AST SERPL W P-5'-P-CCNC: 18 U/L (ref 13–39)
BACTERIA UR QL AUTO: NORMAL /HPF
BASOPHILS # BLD AUTO: 0.07 THOUSANDS/ΜL (ref 0–0.1)
BASOPHILS NFR BLD AUTO: 1 % (ref 0–1)
BILIRUB SERPL-MCNC: 0.33 MG/DL (ref 0.2–1)
BILIRUB UR QL STRIP: NEGATIVE
BUN SERPL-MCNC: 21 MG/DL (ref 5–25)
CALCIUM SERPL-MCNC: 9.7 MG/DL (ref 8.4–10.2)
CARDIAC TROPONIN I PNL SERPL HS: 3 NG/L
CHLORIDE SERPL-SCNC: 102 MMOL/L (ref 96–108)
CLARITY UR: CLEAR
CO2 SERPL-SCNC: 28 MMOL/L (ref 21–32)
COLOR UR: ABNORMAL
CREAT SERPL-MCNC: 0.91 MG/DL (ref 0.6–1.3)
EOSINOPHIL # BLD AUTO: 0.96 THOUSAND/ΜL (ref 0–0.61)
EOSINOPHIL NFR BLD AUTO: 10 % (ref 0–6)
ERYTHROCYTE [DISTWIDTH] IN BLOOD BY AUTOMATED COUNT: 11.5 % (ref 11.6–15.1)
GFR SERPL CREATININE-BSD FRML MDRD: 65 ML/MIN/1.73SQ M
GLUCOSE SERPL-MCNC: 142 MG/DL (ref 65–140)
GLUCOSE UR STRIP-MCNC: NEGATIVE MG/DL
HCT VFR BLD AUTO: 47.2 % (ref 34.8–46.1)
HGB BLD-MCNC: 15.4 G/DL (ref 11.5–15.4)
HGB UR QL STRIP.AUTO: NEGATIVE
IMM GRANULOCYTES # BLD AUTO: 0.02 THOUSAND/UL (ref 0–0.2)
IMM GRANULOCYTES NFR BLD AUTO: 0 % (ref 0–2)
KETONES UR STRIP-MCNC: NEGATIVE MG/DL
LEUKOCYTE ESTERASE UR QL STRIP: ABNORMAL
LIPASE SERPL-CCNC: 31 U/L (ref 11–82)
LYMPHOCYTES # BLD AUTO: 2.6 THOUSANDS/ΜL (ref 0.6–4.47)
LYMPHOCYTES NFR BLD AUTO: 26 % (ref 14–44)
MAGNESIUM SERPL-MCNC: 1.9 MG/DL (ref 1.9–2.7)
MCH RBC QN AUTO: 31.2 PG (ref 26.8–34.3)
MCHC RBC AUTO-ENTMCNC: 32.6 G/DL (ref 31.4–37.4)
MCV RBC AUTO: 96 FL (ref 82–98)
MONOCYTES # BLD AUTO: 0.74 THOUSAND/ΜL (ref 0.17–1.22)
MONOCYTES NFR BLD AUTO: 8 % (ref 4–12)
NEUTROPHILS # BLD AUTO: 5.5 THOUSANDS/ΜL (ref 1.85–7.62)
NEUTS SEG NFR BLD AUTO: 55 % (ref 43–75)
NITRITE UR QL STRIP: NEGATIVE
NON-SQ EPI CELLS URNS QL MICRO: NORMAL /HPF
NRBC BLD AUTO-RTO: 0 /100 WBCS
PH UR STRIP.AUTO: 7 [PH]
PLATELET # BLD AUTO: 258 THOUSANDS/UL (ref 149–390)
PMV BLD AUTO: 9.6 FL (ref 8.9–12.7)
POTASSIUM SERPL-SCNC: 3.8 MMOL/L (ref 3.5–5.3)
PROT SERPL-MCNC: 7.6 G/DL (ref 6.4–8.4)
PROT UR STRIP-MCNC: NEGATIVE MG/DL
RBC # BLD AUTO: 4.94 MILLION/UL (ref 3.81–5.12)
RBC #/AREA URNS AUTO: NORMAL /HPF
SODIUM SERPL-SCNC: 139 MMOL/L (ref 135–147)
SP GR UR STRIP.AUTO: 1.01 (ref 1–1.03)
UROBILINOGEN UR QL STRIP.AUTO: 0.2 E.U./DL
WBC # BLD AUTO: 9.89 THOUSAND/UL (ref 4.31–10.16)
WBC #/AREA URNS AUTO: NORMAL /HPF

## 2022-06-24 PROCEDURE — 85025 COMPLETE CBC W/AUTO DIFF WBC: CPT | Performed by: EMERGENCY MEDICINE

## 2022-06-24 PROCEDURE — 99284 EMERGENCY DEPT VISIT MOD MDM: CPT | Performed by: EMERGENCY MEDICINE

## 2022-06-24 PROCEDURE — 80053 COMPREHEN METABOLIC PANEL: CPT | Performed by: EMERGENCY MEDICINE

## 2022-06-24 PROCEDURE — 81001 URINALYSIS AUTO W/SCOPE: CPT | Performed by: EMERGENCY MEDICINE

## 2022-06-24 PROCEDURE — 36415 COLL VENOUS BLD VENIPUNCTURE: CPT | Performed by: EMERGENCY MEDICINE

## 2022-06-24 PROCEDURE — 93005 ELECTROCARDIOGRAM TRACING: CPT

## 2022-06-24 PROCEDURE — 83735 ASSAY OF MAGNESIUM: CPT | Performed by: EMERGENCY MEDICINE

## 2022-06-24 PROCEDURE — 81003 URINALYSIS AUTO W/O SCOPE: CPT | Performed by: EMERGENCY MEDICINE

## 2022-06-24 PROCEDURE — 74176 CT ABD & PELVIS W/O CONTRAST: CPT

## 2022-06-24 PROCEDURE — G1004 CDSM NDSC: HCPCS

## 2022-06-24 PROCEDURE — 99284 EMERGENCY DEPT VISIT MOD MDM: CPT

## 2022-06-24 PROCEDURE — 84484 ASSAY OF TROPONIN QUANT: CPT | Performed by: EMERGENCY MEDICINE

## 2022-06-24 PROCEDURE — 83690 ASSAY OF LIPASE: CPT | Performed by: EMERGENCY MEDICINE

## 2022-06-24 RX ORDER — PANTOPRAZOLE SODIUM 40 MG/1
40 TABLET, DELAYED RELEASE ORAL ONCE
Status: COMPLETED | OUTPATIENT
Start: 2022-06-24 | End: 2022-06-24

## 2022-06-24 RX ORDER — PANTOPRAZOLE SODIUM 20 MG/1
20 TABLET, DELAYED RELEASE ORAL DAILY
Qty: 30 TABLET | Refills: 0 | Status: SHIPPED | OUTPATIENT
Start: 2022-06-24 | End: 2022-07-29 | Stop reason: SDUPTHER

## 2022-06-24 RX ADMIN — PANTOPRAZOLE SODIUM 40 MG: 40 TABLET, DELAYED RELEASE ORAL at 19:15

## 2022-06-24 NOTE — ED PROVIDER NOTES
History  Chief Complaint   Patient presents with    Epigastric Pain     Pt reports she has epigastric pain that radiates to her back x1 and worsened over the last two days     40-year-old female presenting with midepigastric pain intermittent over last several days to week  Patient states she has had epigastric pain which was intermittent, progressively worsening  Seems to get worse with spicy food  Patient states she does not eaten greasy food  She did have some nausea with it  She denies chest pain or shortness of breath  Distant history of a cholecystectomy, no fevers, chills, vomiting  Describes burning in the epigastrium  Additionally patient complaining of right-sided flank pain which radiates around thoracic ribs has been intermittent over the last several days as well  Patient states this started after her neighbor's dog pulled her arm and believes that this is likely a musculoskeletal sprain  Epigastric Pain  Pain location:  Epigastric  Pain quality: dull    Pain severity:  Mild  Timing:  Constant  Chronicity:  New  Associated symptoms: abdominal pain and nausea    Associated symptoms: no back pain, no cough, no fever, no numbness, not vomiting and no weakness        Prior to Admission Medications   Prescriptions Last Dose Informant Patient Reported? Taking?    Ascorbic Acid (VITAMIN C PO)  Self Yes No   Sig: Take 500 mg by mouth daily     Probiotic Product (PROBIOTIC PO)   Yes No   Sig: Take by mouth in the morning   atorvastatin (LIPITOR) 40 mg tablet   No No   Sig: Take 1 tablet (40 mg total) by mouth daily with dinner for 10 days   cholecalciferol (VITAMIN D3) 1,000 units tablet   Yes No   Sig: Take 5,000 Units by mouth daily        Facility-Administered Medications: None       Past Medical History:   Diagnosis Date    Bell's palsy     Colon polyp     Hypertension     Kidney calculi     Spinal stenosis        Past Surgical History:   Procedure Laterality Date    CHOLECYSTECTOMY      COLONOSCOPY      ENDOMETRIAL ABLATION      LAMINECTOMY  06/14/2019    Lumbar spine - With placement of Trial device nonfusion device       Family History   Problem Relation Age of Onset    Dementia Mother     Heart disease Mother     Emphysema Father     Colon cancer Maternal Uncle      I have reviewed and agree with the history as documented  E-Cigarette/Vaping    E-Cigarette Use Never User      E-Cigarette/Vaping Substances     Social History     Tobacco Use    Smoking status: Never Smoker    Smokeless tobacco: Never Used   Vaping Use    Vaping Use: Never used   Substance Use Topics    Alcohol use: Yes     Comment: occasionally    Drug use: No       Review of Systems   Constitutional: Negative for chills and fever  HENT: Negative for congestion, nosebleeds, rhinorrhea and sore throat  Eyes: Negative for pain and visual disturbance  Respiratory: Negative for cough and wheezing  Cardiovascular: Negative for chest pain and leg swelling  Gastrointestinal: Positive for abdominal pain and nausea  Negative for abdominal distention, diarrhea and vomiting  Genitourinary: Negative for dysuria and frequency  Musculoskeletal: Negative for back pain and joint swelling  Skin: Negative for rash and wound  Neurological: Negative for weakness and numbness  Psychiatric/Behavioral: Negative for decreased concentration and suicidal ideas  All other systems reviewed and are negative  Physical Exam  Physical Exam  Vitals and nursing note reviewed  Constitutional:       Appearance: She is well-developed  HENT:      Head: Normocephalic and atraumatic  Eyes:      Conjunctiva/sclera: Conjunctivae normal       Pupils: Pupils are equal, round, and reactive to light  Neck:      Trachea: No tracheal deviation  Cardiovascular:      Rate and Rhythm: Normal rate and regular rhythm  Heart sounds: Normal heart sounds  No murmur heard    Pulmonary:      Effort: Pulmonary effort is normal  No respiratory distress  Breath sounds: Normal breath sounds  No wheezing or rales  Abdominal:      General: Bowel sounds are normal  There is no distension  Palpations: Abdomen is soft  Tenderness: There is no abdominal tenderness  Musculoskeletal:         General: No deformity  Cervical back: Normal range of motion and neck supple  Skin:     General: Skin is warm and dry  Capillary Refill: Capillary refill takes less than 2 seconds  Neurological:      Mental Status: She is alert and oriented to person, place, and time  Sensory: No sensory deficit     Psychiatric:         Judgment: Judgment normal          Vital Signs  ED Triage Vitals [06/24/22 1716]   Temperature Pulse Respirations Blood Pressure SpO2   97 9 °F (36 6 °C) 91 15 137/81 99 %      Temp Source Heart Rate Source Patient Position - Orthostatic VS BP Location FiO2 (%)   Temporal Monitor -- Right arm --      Pain Score       8           Vitals:    06/24/22 1716 06/24/22 1900   BP: 137/81 127/72   Pulse: 91 68         Visual Acuity      ED Medications  Medications   pantoprazole (PROTONIX) EC tablet 40 mg (40 mg Oral Given 6/24/22 1915)       Diagnostic Studies  Results Reviewed     Procedure Component Value Units Date/Time    HS Troponin 0hr (reflex protocol) [[id number]]  (Normal) Collected: 06/24/22 1743    Lab Status: Final result Specimen: Blood from Arm, Right Updated: 06/24/22 1813     hs TnI 0hr 3 ng/L     Comprehensive metabolic panel [[id number]]  (Abnormal) Collected: 06/24/22 1743    Lab Status: Final result Specimen: Blood from Arm, Right Updated: 06/24/22 1806     Sodium 139 mmol/L      Potassium 3 8 mmol/L      Chloride 102 mmol/L      CO2 28 mmol/L      ANION GAP 9 mmol/L      BUN 21 mg/dL      Creatinine 0 91 mg/dL      Glucose 142 mg/dL      Calcium 9 7 mg/dL      AST 18 U/L      ALT 15 U/L      Alkaline Phosphatase 76 U/L      Total Protein 7 6 g/dL      Albumin 4 2 g/dL      Total Bilirubin 0 33 mg/dL eGFR 65 ml/min/1 73sq m     Narrative:      [place] guidelines for Chronic Kidney Disease (CKD):     Stage 1 with normal or high GFR (GFR > 90 mL/min/1 73 square meters)    Stage 2 Mild CKD (GFR = 60-89 mL/min/1 73 square meters)    Stage 3A Moderate CKD (GFR = 45-59 mL/min/1 73 square meters)    Stage 3B Moderate CKD (GFR = 30-44 mL/min/1 73 square meters)    Stage 4 Severe CKD (GFR = 15-29 mL/min/1 73 square meters)    Stage 5 End Stage CKD (GFR <15 mL/min/1 73 square meters)  Note: GFR calculation is accurate only with a steady state creatinine    Lipase [[id number]]  (Normal) Collected: 06/24/22 1743    Lab Status: Final result Specimen: Blood from Arm, Right Updated: 06/24/22 1806     Lipase 31 u/L     Magnesium [[id number]]  (Normal) Collected: 06/24/22 1743    Lab Status: Final result Specimen: Blood from Arm, Right Updated: 06/24/22 1806     Magnesium 1 9 mg/dL     Urine Microscopic [[id number]]  (Normal) Collected: 06/24/22 1743    Lab Status: Final result Specimen: Urine, Clean Catch Updated: 06/24/22 1801     RBC, UA None Seen /hpf      WBC, UA 1-2 /hpf      Epithelial Cells None Seen /hpf      Bacteria, UA Occasional /hpf     UA w Reflex to Microscopic w Reflex to Culture [[id number]]  (Abnormal) Collected: 06/24/22 1743    Lab Status: Final result Specimen: Urine, Clean Catch Updated: 06/24/22 1750     Color, UA Straw     Clarity, UA Clear     Specific Gravity, UA 1 010     pH, UA 7 0     Leukocytes, UA 1+     Nitrite, UA Negative     Protein, UA Negative mg/dl      Glucose, UA Negative mg/dl      Ketones, UA Negative mg/dl      Urobilinogen, UA 0 2 E U /dl      Bilirubin, UA Negative     Occult Blood, UA Negative    CBC and differential [[id number]]  (Abnormal) Collected: 06/24/22 1743    Lab Status: Final result Specimen: Blood from Arm, Right Updated: 06/24/22 1749     WBC 9 89 Thousand/uL      RBC 4 94 Million/uL      Hemoglobin 15 4 g/dL      Hematocrit 47 2 %      MCV 96 fL      MCH 31 2 pg      MCHC 32 6 g/dL      RDW 11 5 %      MPV 9 6 fL      Platelets 858 Thousands/uL      nRBC 0 /100 WBCs      Neutrophils Relative 55 %      Immat GRANS % 0 %      Lymphocytes Relative 26 %      Monocytes Relative 8 %      Eosinophils Relative 10 %      Basophils Relative 1 %      Neutrophils Absolute 5 50 Thousands/µL      Immature Grans Absolute 0 02 Thousand/uL      Lymphocytes Absolute 2 60 Thousands/µL      Monocytes Absolute 0 74 Thousand/µL      Eosinophils Absolute 0 96 Thousand/µL      Basophils Absolute 0 07 Thousands/µL                  CT renal stone study abdomen pelvis wo contrast   Final Result by Marge Travis DO (06/24 1905)      Nonobstructing left-sided punctate intrarenal calculi  No evidence of hydronephrosis  Workstation performed: [id number]                    Procedures  Procedures         ED Course                                             MDM  Number of Diagnoses or Management Options  Abdominal pain: new and requires workup  Gastritis: new and requires workup  Diagnosis management comments: Signs symptoms consistent with peptic ulcer disease, however can not rule out alternative etiologies such as nephrolithiasis given she has also been having intermittent right-sided flank pain  Blood work reassuring, CT scan reassuring  No emergent etiology of her symptoms found  Pain not only patient is suggest cardiac her thoracic etiology  EKG and troponin reassuring    Will give GI follow-up, start Protonix, patient is comfortable with discharge plan       Amount and/or Complexity of Data Reviewed  Review and summarize past medical records: yes  Independent visualization of images, tracings, or specimens: yes    Risk of Complications, Morbidity, and/or Mortality  Presenting problems: high  Diagnostic procedures: minimal  Management options: high        Disposition  Final diagnoses:   Abdominal pain   Gastritis     Time reflects when diagnosis was documented in both MDM as applicable and the Disposition within this note     Time User Action Codes Description Comment    6/24/2022  7:11 PM Bhavna Salrao Add [R10 9] Abdominal pain     6/24/2022  7:11 PM Bhavna Salrao Add [K29 70] Gastritis       ED Disposition     ED Disposition   Discharge    Condition   Stable    Date/Time   Fri Jun 24, 2022  7:11 PM    Comment   Zak Boxer [facility] discharge to home/self care                 Follow-up Information     Follow up With Specialties Details Why Contact Info    Alex Cruz MD Gastroenterology Schedule an appointment as soon as possible for a visit   [facility] [address]  [phone]            Discharge Medication List as of 6/24/2022  7:14 PM      START taking these medications    Details   pantoprazole (PROTONIX) 20 mg tablet Take 1 tablet (20 mg total) by mouth daily, Starting Fri 6/24/2022, Normal         CONTINUE these medications which have NOT CHANGED    Details   Ascorbic Acid (VITAMIN C PO) Take 500 mg by mouth daily  , Historical Med      atorvastatin (LIPITOR) 40 mg tablet Take 1 tablet (40 mg total) by mouth daily with dinner for 10 days, Starting Tue 11/9/2021, Until Fri 11/19/2021, Normal      cholecalciferol (VITAMIN D3) 1,000 units tablet Take 5,000 Units by mouth daily  , Historical Med      Probiotic Product (PROBIOTIC PO) Take by mouth in the morning, Historical Med                 PDMP Review     None          ED Provider  Electronically Signed by           James Mitchell DO  06/24/22 5349

## 2022-06-24 NOTE — DISCHARGE INSTRUCTIONS
Eat a bland diet  Avoid spicy, fatty, or astringent/acidic foods  Return at anytime for any reason  Follow up with gastroenterology  Start the anti-acid medication

## 2022-06-25 LAB
ATRIAL RATE: 77 BPM
P AXIS: 83 DEGREES
PR INTERVAL: 174 MS
QRS AXIS: 63 DEGREES
QRSD INTERVAL: 74 MS
QT INTERVAL: 370 MS
QTC INTERVAL: 418 MS
T WAVE AXIS: 56 DEGREES
VENTRICULAR RATE: 77 BPM

## 2022-06-25 PROCEDURE — 93010 ELECTROCARDIOGRAM REPORT: CPT | Performed by: INTERNAL MEDICINE

## 2022-07-14 ENCOUNTER — TELEPHONE (OUTPATIENT)
Dept: MAMMOGRAPHY | Facility: HOSPITAL | Age: 67
End: 2022-07-14

## 2022-07-29 ENCOUNTER — TELEPHONE (OUTPATIENT)
Dept: OTHER | Facility: OTHER | Age: 67
End: 2022-07-29

## 2022-07-29 DIAGNOSIS — K29.70 GASTRITIS: ICD-10-CM

## 2022-07-29 RX ORDER — PANTOPRAZOLE SODIUM 20 MG/1
20 TABLET, DELAYED RELEASE ORAL DAILY
Qty: 30 TABLET | Refills: 0 | Status: SHIPPED | OUTPATIENT
Start: 2022-07-29 | End: 2022-08-24 | Stop reason: SDUPTHER

## 2022-07-29 NOTE — TELEPHONE ENCOUNTER
OV Dr Dany Mosquera on 8/30    Spoke with pt and confirmed she has an appt of 8/30 not a colonoscopy  Wants to schedule a colonoscopy & EGD     Pt reporting symptoms associated with hemorrhoids and using preparation H cream   Symptom of pressure in lower back and legs that is concerning post BM, pt eats a lot of fresh fruit and doesn't think its a fiber issue  Denies rectal bleeding or pain  Reports going 5-6 times a day intermittently and BM feels like it gets stuck  Advised to continue with the prep H cream PRN  Recommended Colace BID to reduce incidence of constipation symptoms   Pt agreeable and verbalized understanding of information

## 2022-07-31 DIAGNOSIS — Z86.010 HISTORY OF COLON POLYPS: Primary | ICD-10-CM

## 2022-08-02 ENCOUNTER — TELEPHONE (OUTPATIENT)
Dept: GASTROENTEROLOGY | Facility: MEDICAL CENTER | Age: 67
End: 2022-08-02

## 2022-08-02 NOTE — TELEPHONE ENCOUNTER
Spoke to patient and scheduled colonoscopy  Patient stated that she has some really bad internal and external hemorrhoids and the Preparation H isn't really helping  Patient also states that there is no way she can drink all of the Golytely that was prescribed for her colonoscopy  Patient wants to speak directly to Dr Lorie Levine about these issues   Please advise

## 2022-08-02 NOTE — TELEPHONE ENCOUNTER
Patient has been rescheduled for her Colonoscopy per Dr Mukul Farias request       Scheduled date of colon (as of today) 10/11/22  Physician performing Dr Mukul Farias:  Location of procedure  [place]: Bowel prep reviewed with patient:  To be determined by Dr Veronica Lind  Instructions reviewed with patient by: michell  Clearances: na

## 2022-08-03 ENCOUNTER — HOSPITAL ENCOUNTER (OUTPATIENT)
Dept: MAMMOGRAPHY | Facility: HOSPITAL | Age: 67
Discharge: HOME | End: 2022-08-03
Payer: MEDICARE

## 2022-08-03 ENCOUNTER — HOSPITAL ENCOUNTER (OUTPATIENT)
Dept: ULTRASOUND IMAGING | Facility: HOSPITAL | Age: 67
Discharge: HOME | End: 2022-08-03
Payer: MEDICARE

## 2022-08-03 VITALS — BODY MASS INDEX: 29.82 KG/M2 | HEIGHT: 62 IN | WEIGHT: 162.04 LBS

## 2022-08-03 DIAGNOSIS — Z12.31 ENCOUNTER FOR SCREENING MAMMOGRAM FOR MALIGNANT NEOPLASM OF BREAST: ICD-10-CM

## 2022-08-03 DIAGNOSIS — N64.4 MASTODYNIA: ICD-10-CM

## 2022-08-03 PROCEDURE — 77066 DX MAMMO INCL CAD BI: CPT

## 2022-08-03 PROCEDURE — G0279 TOMOSYNTHESIS, MAMMO: HCPCS

## 2022-08-03 PROCEDURE — 76642 ULTRASOUND BREAST LIMITED: CPT

## 2022-08-22 NOTE — TELEPHONE ENCOUNTER
Patients GI provider:  Dr Morro Kang     Number to return call: [phone]    Reason for call: Pt calling stating she believes she has a prolapse rectum and wanted to know if there is something can be done before her procedure    Scheduled procedure/appointment date if applicable: Procedure - 07/27/21 Length To Time In Minutes Device Was In Place: 10

## 2022-08-24 ENCOUNTER — APPOINTMENT (OUTPATIENT)
Dept: RADIOLOGY | Facility: MEDICAL CENTER | Age: 67
End: 2022-08-24
Payer: MEDICARE

## 2022-08-24 ENCOUNTER — OFFICE VISIT (OUTPATIENT)
Dept: FAMILY MEDICINE CLINIC | Facility: CLINIC | Age: 67
End: 2022-08-24
Payer: MEDICARE

## 2022-08-24 VITALS
BODY MASS INDEX: 29.3 KG/M2 | OXYGEN SATURATION: 96 % | DIASTOLIC BLOOD PRESSURE: 82 MMHG | TEMPERATURE: 97.5 F | HEIGHT: 62 IN | HEART RATE: 89 BPM | WEIGHT: 159.2 LBS | SYSTOLIC BLOOD PRESSURE: 142 MMHG

## 2022-08-24 DIAGNOSIS — R07.81 RIB PAIN ON LEFT SIDE: ICD-10-CM

## 2022-08-24 DIAGNOSIS — S50.312A ABRASION OF LEFT ELBOW, INITIAL ENCOUNTER: ICD-10-CM

## 2022-08-24 DIAGNOSIS — S80.212A ABRASION OF LEFT KNEE, INITIAL ENCOUNTER: ICD-10-CM

## 2022-08-24 DIAGNOSIS — S60.511A ABRASION OF RIGHT HAND, INITIAL ENCOUNTER: ICD-10-CM

## 2022-08-24 DIAGNOSIS — K29.70 GASTRITIS: ICD-10-CM
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
## 2022-08-24 PROBLEM — J96.01 ACUTE RESPIRATORY FAILURE WITH HYPOXIA (HCC): Status: RESOLVED | Noted: 2021-11-06 | Resolved: 2022-08-24

## 2022-08-24 PROCEDURE — 71101 X-RAY EXAM UNILAT RIBS/CHEST: CPT

## 2022-08-24 PROCEDURE — 99214 OFFICE O/P EST MOD 30 MIN: CPT | Performed by: PHYSICIAN ASSISTANT

## 2022-08-24 RX ORDER — PANTOPRAZOLE SODIUM 20 MG/1
20 TABLET, DELAYED RELEASE ORAL DAILY
Qty: 30 TABLET | Refills: 2 | Status: SHIPPED | OUTPATIENT
Start: 2022-08-24 | End: 2022-08-30

## 2022-08-24 NOTE — PROGRESS NOTES
Assessment/Plan:    Problem List Items Addressed This Visit    None     Visit Diagnoses     Fall, initial encounter    -  Primary    Relevant Orders    XR ribs left w pa chest min 3 views    Gastritis        Relevant Medications    pantoprazole (PROTONIX) 20 mg tablet    Rib pain on left side        Relevant Orders    XR ribs left w pa chest min 3 views    Abrasion of left knee, initial encounter        Abrasion of left elbow, initial encounter        Abrasion of right hand, initial encounter               Diagnoses and all orders for this visit:    Fall, initial encounter  -     XR ribs left w pa chest min 3 views; Future    Gastritis  -     pantoprazole (PROTONIX) 20 mg tablet; Take 1 tablet (20 mg total) by mouth daily    Rib pain on left side  -     XR ribs left w pa chest min 3 views; Future    Abrasion of left knee, initial encounter    Abrasion of left elbow, initial encounter    Abrasion of right hand, initial encounter      Xray of left ribs ordered  Recommended that she continue to take deep breaths  She may continue ibuprofen and alternate with tylenol  Try heating pad on side    Subjective:      Patient ID: John Telles is a 79 y o  female  Shree Mom is a pleasant 79year old female who is here today complaining of left sided rib pain  She fell on Saturday  She missed a curb and landed on her left side  She scraped her left knee, left elbow, and right hand  She has been taking motrin for the pain  This morning, the rib pain was more intense  It hurts if she laughs or takes a deep breath  The following portions of the patient's history were reviewed and updated as appropriate:   She has a past medical history of Bell's palsy, Colon polyp, Hypertension, Kidney calculi, and Spinal stenosis  ,  does not have any pertinent problems on file  ,   has a past surgical history that includes Cholecystectomy; Endometrial ablation; Laminectomy (06/14/2019); and Colonoscopy  ,  family history includes Colon cancer in her maternal uncle; Dementia in her mother; Emphysema in her father; Heart disease in her mother; No Known Problems in her brother, daughter, maternal grandfather, maternal grandmother, maternal uncle, maternal uncle, maternal uncle, maternal uncle, paternal grandfather, paternal grandmother, and son ,   reports that she has never smoked  She has never used smokeless tobacco  She reports current alcohol use  She reports that she does not use drugs  ,  is allergic to tramadol, ciprofloxacin, lisinopril, and prednisone     Current Outpatient Medications   Medication Sig Dispense Refill    Ascorbic Acid (VITAMIN C PO) Take 500 mg by mouth daily        cholecalciferol (VITAMIN D3) 1,000 units tablet Take 5,000 Units by mouth daily        pantoprazole (PROTONIX) 20 mg tablet Take 1 tablet (20 mg total) by mouth daily 30 tablet 2    Probiotic Product (PROBIOTIC PO) Take by mouth in the morning      atorvastatin (LIPITOR) 40 mg tablet Take 1 tablet (40 mg total) by mouth daily with dinner for 10 days 10 tablet 0    polyethylene glycol (GOLYTELY) 4000 mL solution Take 4,000 mL by mouth once for 1 dose 4000 mL 0     No current facility-administered medications for this visit  Review of Systems   Constitutional: Negative for chills, diaphoresis, fatigue and fever  HENT: Negative for congestion, ear pain, postnasal drip, rhinorrhea, sneezing, sore throat and trouble swallowing  Eyes: Negative for pain and visual disturbance  Respiratory: Negative for apnea, cough, shortness of breath and wheezing  Cardiovascular: Negative for chest pain and palpitations  Gastrointestinal: Negative for abdominal pain, constipation, diarrhea, nausea and vomiting  Genitourinary: Negative for dysuria  Musculoskeletal: Positive for arthralgias (left rib pain)  Negative for gait problem and myalgias  Skin: Positive for wound (abrasions of knee, elbow, and hand)     Neurological: Negative for dizziness, syncope, weakness, light-headedness, numbness and headaches  Psychiatric/Behavioral: Negative for suicidal ideas  The patient is not nervous/anxious  Objective:  Vitals:    08/24/22 1440   BP: 142/82   Pulse: 89   Temp: 97 5 °F (36 4 °C)   SpO2: 96%   Weight: 72 2 kg (159 lb 3 2 oz)   Height: 5' 2" (1 575 m)     Body mass index is 29 12 kg/m²  Physical Exam  Vitals and nursing note reviewed  Constitutional:       Appearance: She is well-developed  HENT:      Head: Normocephalic and atraumatic  Right Ear: Tympanic membrane, ear canal and external ear normal       Left Ear: Tympanic membrane, ear canal and external ear normal       Nose: Nose normal       Mouth/Throat:      Pharynx: No oropharyngeal exudate or posterior oropharyngeal erythema  Eyes:      Pupils: Pupils are equal, round, and reactive to light  Cardiovascular:      Rate and Rhythm: Normal rate and regular rhythm  Heart sounds: Normal heart sounds  No murmur heard  No friction rub  No gallop  Pulmonary:      Effort: Pulmonary effort is normal  No respiratory distress  Breath sounds: Normal breath sounds  No wheezing or rales  Chest:      Chest wall: Tenderness present  No crepitus  Musculoskeletal:         General: Normal range of motion  Cervical back: Normal range of motion and neck supple  Left knee: Normal  No erythema or bony tenderness  Normal range of motion  Lymphadenopathy:      Cervical: No cervical adenopathy  Skin:     General: Skin is warm and dry  Findings: No rash  Comments: Abrasions of left elbow, left knee, and right hand  No signs of infection   Neurological:      Mental Status: She is alert and oriented to person, place, and time  Psychiatric:         Behavior: Behavior normal          Thought Content:  Thought content normal          Judgment: Judgment normal

## 2022-08-30 ENCOUNTER — OFFICE VISIT (OUTPATIENT)
Dept: GASTROENTEROLOGY | Facility: CLINIC | Age: 67
End: 2022-08-30
Payer: MEDICARE

## 2022-08-30 VITALS
HEART RATE: 81 BPM | WEIGHT: 160.2 LBS | HEIGHT: 62 IN | BODY MASS INDEX: 29.48 KG/M2 | SYSTOLIC BLOOD PRESSURE: 141 MMHG | RESPIRATION RATE: 16 BRPM | TEMPERATURE: 97.3 F | DIASTOLIC BLOOD PRESSURE: 90 MMHG

## 2022-08-30 DIAGNOSIS — K59.00 CONSTIPATION, UNSPECIFIED CONSTIPATION TYPE: ICD-10-CM

## 2022-08-30 DIAGNOSIS — R10.84 GENERALIZED ABDOMINAL PAIN: ICD-10-CM

## 2022-08-30 DIAGNOSIS — R10.12 LEFT UPPER QUADRANT ABDOMINAL PAIN: ICD-10-CM

## 2022-08-30 DIAGNOSIS — K21.9 GASTROESOPHAGEAL REFLUX DISEASE WITHOUT ESOPHAGITIS: Primary | ICD-10-CM

## 2022-08-30 PROCEDURE — 99214 OFFICE O/P EST MOD 30 MIN: CPT | Performed by: NURSE PRACTITIONER

## 2022-08-30 RX ORDER — PANTOPRAZOLE SODIUM 40 MG/1
40 TABLET, DELAYED RELEASE ORAL DAILY
Qty: 30 TABLET | Refills: 7 | Status: SHIPPED | OUTPATIENT
Start: 2022-08-30

## 2022-08-30 NOTE — PROGRESS NOTES
[facility] - Outpatient Follow-up Note  Paola Kyle 79 y o  female MRN: [id number]  Encounter: [id number]          ASSESSMENT AND PLAN:      1  Gastroesophageal reflux disease without esophagitis  2  Left upper quadrant abdominal pain    Patient has a history of GERD in which she underwent EGD in July of 2021  This revealed normal in appearance however biopsies revealed chronic inactive gastritis, equivocal immunostain for H pylori favoring negative, a minute focus suspicious for intestinal metaplasia, no dysplasia or neoplasia identified  She was recommended to repeat in 3 years due to possible intestinal metaplasia  Patient is currently taking pantoprazole 40 mg daily and does report occasional esophageal discomfort  She does however report that she fell recently on her left side and has been having pain in her left upper quadrant  She did undergo an x-ray of the chest that was negative for fracture  Given previous EGD results and new left-sided upper abdominal discomfort, would recommend repeat EGD at this time  Process, risks and benefits discussed with patient, she is agreeable  - EGD  - pantoprazole (PROTONIX) 40 mg tablet; Take 1 tablet (40 mg total) by mouth daily  Dispense: 30 tablet; Refill: 7      3  Constipation, unspecified constipation type  4  Generalized abdominal pain    Patient reports that she has not been having significant difficulty having a bowel movement  She states that she must strain considerably to have a bowel movement  She often reports needing to apply pressure to the side of her rectal area to release the stool  She reports the stool often comes out in small pellets  She states she is only taking Colace at this time with no relief  She also reports abdominal discomfort and bloating    Patient's last colonoscopy was in July of 2021 that revealed a poor prep, 1 cecal polyp with portions of tubular adenoma but negative for high-grade dysplasia and a transverse colon polyp with polypoid colonic mucosa suggesting inflammatory polyp  She was recommended to repeat in 1 year and does have repeat scheduled for October  Discussed the use of MiraLax up to twice a day for significant constipation  Patient is agreeable to a trial of this  Discussed that if her constipation continues despite MiraLax and stool softeners, could consider a trial of Linzess  Patient verbalized understanding       - MiraLax 1 cap full once or twice a day   - colonoscopy  - polyethylene glycol (GOLYTELY) 4000 mL solution; Take 4,000 mL by mouth once for 1 dose  Dispense: 4000 mL; Refill: 0    Will see patient back after procedures  ________________________________________________________________    SUBJECTIVEWillette Raphael a 27-year-old female that presents today for follow-up of GERD and constipation  She was originally seen in December 2020 and underwent EGD and colonoscopy in July of 2021  Her EGD revealed a normal appearing esophagus, stomach and duodenum with biopsies revealing chronic inactive gastritis, equivocal immunostain for H pylori favoring negative, a minute focus suspicious for intestinal metaplasia, no dysplasia or neoplasia identified  She was recommended to repeat in 3 years due to possible intestinal metaplasia  Her colonoscopy revealed a poor prep, 1 cecal polyp with portions of tubular adenoma but negative for high-grade dysplasia and a transverse colon polyp with polypoid colonic mucosa suggesting inflammatory polyp  She was recommended to repeat in 1 year due to a poor prep  Patient reports today that she is not feeling well  She states that she has a very difficult time having a bowel movement  She states that she is straining significantly and will often to apply pressure to this side of her rectal area to relieve her stool  She is currently taking Colace with little relief  She reports that the stool is often like pellets    She states that she had taken MiraLax in the past but is not taking at this time  She states that she has fallen recently on to her left side and has had pain in her left upper quadrant  She did undergo x-rays with no fractures noted  She reports that the pain is not associated with eating  She does report taking pantoprazole 40 mg daily but will occasionally have epigastric and esophageal burning  REVIEW OF SYSTEMS:  Review of Systems   HENT: Negative for trouble swallowing  Gastrointestinal: Positive for abdominal distention, abdominal pain, constipation and rectal pain  Negative for nausea and vomiting  All other systems reviewed and are negative          Historical Information   Past Medical History:   Diagnosis Date    Bell's palsy     Colon polyp     Hypertension     Kidney calculi     Spinal stenosis      Past Surgical History:   Procedure Laterality Date    CHOLECYSTECTOMY      COLONOSCOPY      ENDOMETRIAL ABLATION      LAMINECTOMY  06/14/2019    Lumbar spine - With placement of Trial device nonfusion device     Social History   Social History     Substance and Sexual Activity   Alcohol Use Yes    Comment: occasionally     Social History     Substance and Sexual Activity   Drug Use No     Social History     Tobacco Use   Smoking Status Never Smoker   Smokeless Tobacco Never Used     Family History   Problem Relation Age of Onset    Dementia Mother     Heart disease Mother     Emphysema Father     No Known Problems Daughter     No Known Problems Maternal Grandmother     No Known Problems Maternal Grandfather     No Known Problems Paternal Grandmother     No Known Problems Paternal Grandfather     Colon cancer Maternal Uncle     No Known Problems Maternal Uncle     No Known Problems Maternal Uncle     No Known Problems Maternal Uncle     No Known Problems Maternal Uncle     No Known Problems Son     No Known Problems Brother        Meds/Allergies       Current Outpatient Medications:     Ascorbic Acid (VITAMIN C PO)    cholecalciferol (VITAMIN D3) 1,000 units tablet    pantoprazole (PROTONIX) 20 mg tablet    Probiotic Product (PROBIOTIC PO)    atorvastatin (LIPITOR) 40 mg tablet    polyethylene glycol (GOLYTELY) 4000 mL solution    Allergies   Allergen Reactions    Tramadol Vomiting    Ciprofloxacin Other (See Comments)     unknown    Lisinopril      Pt had one dose and became became presyncopal    Prednisone GI Intolerance           Objective     Blood pressure 141/90, pulse 81, temperature (!) 97 3 °F (36 3 °C), temperature source Tympanic, resp  rate 16, height 5' 2" (1 575 m), weight 72 7 kg (160 lb 3 2 oz)  Body mass index is 29 3 kg/m²  PHYSICAL EXAM:      General Appearance:   Alert, cooperative, no distress   HEENT:   Normocephalic, atraumatic, anicteric  Neck:  Supple, symmetrical, trachea midline   Lungs:   Clear to auscultation bilaterally; no rales, rhonchi or wheezing; respirations unlabored    Heart[de-identified]   Regular rate and rhythm; no murmur, rub, or gallop  Abdomen:   Soft, non-tender, non-distended; normal bowel sounds; no masses, no organomegaly    Genitalia:   Deferred    Rectal:   Deferred    Extremities:  No cyanosis, clubbing or edema    Skin:  No jaundice, rashes, or lesions             Lab Results:   No visits with results within 1 Day(s) from this visit     Latest known visit with results is:   Admission on 06/24/2022, Discharged on 06/24/2022   Component Date Value    WBC 06/24/2022 9 89     RBC 06/24/2022 4 94     Hemoglobin 06/24/2022 15 4     Hematocrit 06/24/2022 47 2 (A)    MCV 06/24/2022 96     MCH 06/24/2022 31 2     MCHC 06/24/2022 32 6     RDW 06/24/2022 11 5 (A)    MPV 06/24/2022 9 6     Platelets 30/07/4082 258     nRBC 06/24/2022 0     Neutrophils Relative 06/24/2022 55     Immat GRANS % 06/24/2022 0     Lymphocytes Relative 06/24/2022 26     Monocytes Relative 06/24/2022 8     Eosinophils Relative 06/24/2022 10 (A)    Basophils Relative 06/24/2022 1     Neutrophils Absolute 06/24/2022 5 50     Immature Grans Absolute 06/24/2022 0 02     Lymphocytes Absolute 06/24/2022 2 60     Monocytes Absolute 06/24/2022 0 74     Eosinophils Absolute 06/24/2022 0 96 (A)    Basophils Absolute 06/24/2022 0 07     Sodium 06/24/2022 139     Potassium 06/24/2022 3 8     Chloride 06/24/2022 102     CO2 06/24/2022 28     ANION GAP 06/24/2022 9     BUN 06/24/2022 21     Creatinine 06/24/2022 0 91     Glucose 06/24/2022 142 (A)    Calcium 06/24/2022 9 7     AST 06/24/2022 18     ALT 06/24/2022 15     Alkaline Phosphatase 06/24/2022 76     Total Protein 06/24/2022 7 6     Albumin 06/24/2022 4 2     Total Bilirubin 06/24/2022 0 33     eGFR 06/24/2022 65     Magnesium 06/24/2022 1 9     Lipase 06/24/2022 31     hs TnI 0hr 06/24/2022 3     Color, UA 06/24/2022 Straw     Clarity, UA 06/24/2022 Clear     Specific Gravity, UA 06/24/2022 1 010     pH, UA 06/24/2022 7 0     Leukocytes, UA 06/24/2022 1+ (A)    Nitrite, UA 06/24/2022 Negative     Protein, UA 06/24/2022 Negative     Glucose, UA 06/24/2022 Negative     Ketones, UA 06/24/2022 Negative     Urobilinogen, UA 06/24/2022 0 2     Bilirubin, UA 06/24/2022 Negative     Occult Blood, UA 06/24/2022 Negative     RBC, UA 06/24/2022 None Seen     WBC, UA 06/24/2022 1-2     Epithelial Cells 06/24/2022 None Seen     Bacteria, UA 06/24/2022 Occasional     Ventricular Rate 06/24/2022 77     Atrial Rate 06/24/2022 77     AR Interval 06/24/2022 174     QRSD Interval 06/24/2022 74     QT Interval 06/24/2022 370     QTC Interval 06/24/2022 418     P Axis 06/24/2022 83     QRS Axis 06/24/2022 63     T Wave Axis 06/24/2022 56          Radiology Results:   XR ribs left w pa chest min 3 views    Result Date: 8/24/2022  Narrative: LEFT RIBS AND CHEST INDICATION:   W19  XXXA: Unspecified fall, initial encounter R07 81: Pleurodynia   COMPARISON:  AP chest 11/6/2021 VIEWS: XR RIBS LEFT W PA CHEST MIN 3 VIEWS FINDINGS: Cardiomediastinal silhouette appears unremarkable  Lungs are clear  No pleural effusions  There is no pneumothorax  No rib fractures are identified  Impression: No acute cardiopulmonary disease  No evidence of rib fractures  Workstation performed: [id number]     Mammo diagnostic bilateral w 3d & cad, US breast left limited (diagnostic)    Result Date: 8/3/2022  Narrative: DIAGNOSIS: Mastodynia; Encounter for screening mammogram for malignant neoplasm of breast TECHNIQUE: Digital diagnostic mammography was performed  Computer Aided Detection (CAD) analyzed all applicable images  Ultrasound of the bilateral breast(s) was performed  COMPARISONS: Prior breast imaging dated: 02/15/2019 and 06/28/2011 RELEVANT HISTORY: Family Breast Cancer History: No known family history of breast cancer  Family Medical History: Family medical history includes colon cancer in maternal uncle  Personal History: No known relevant hormone history  No known relevant surgical history  No known relevant medical history  RISK ASSESSMENT: 5 Year Tyrer-Cuzick: 1 03 % 10 Year Tyrer-Cuzick: 2 02 % Lifetime Tyrer-Cuzick: 3 88 % TISSUE DENSITY: The breasts are almost entirely fatty  INDICATION: Gavin Brooke is a 79 y o  female presenting for breast pain right  FINDINGS: LEFT A) MASS Mammo diagnostic bilateral w 3d & cad: There is an equal density, oval mass with circumscribed margins seen in the inner central region of the left breast in the posterior depth  This is a new finding  The finding is very superficial, just below the dermis  US breast left limited (diagnostic): There is a 4 mm x 2 mm x 3 mm anechoic mass in or on skin seen in the left breast at 9 o'clock, 12 cm from the nipple  Clear tract to the skin surface  Imaging appearance consistent with miniscule benign sebaceous cyst requiring no additional imaging evaluation   There are no suspicious masses, grouped microcalcifications or areas of unexplained architectural distortion  The skin and nipple areolar complex are unremarkable  Right There are no suspicious masses, grouped microcalcifications or areas of unexplained architectural distortion  The skin and nipple areolar complex are unremarkable  No mammographic findings to explain the patient's inframammary pain rating to the abdominal wall and back  No focal target for ultrasound  Pattern of pain is inconsistent with malignancy and there is no focal target, so ultrasound was forgone  Impression: 1  No imaging findings to explain the patient's lower right breast, abdominal wall, and back pain  Recommend clinical management  2   Miniscule benign sebaceous cyst noted incidentally in the 09:00 left breast  3   No evidence for malignancy in either breast   Recommend routine screening in 12 months  ASSESSMENT/BI-RADS CATEGORY: Left: 2 - Benign Overall: 2 - Benign RECOMMENDATION:      - Routine screening mammogram in 1 year for both breasts   Workstation ID: [id number]

## 2022-10-03 ENCOUNTER — TELEPHONE (OUTPATIENT)
Dept: GASTROENTEROLOGY | Facility: CLINIC | Age: 67
End: 2022-10-03

## 2022-10-03 NOTE — TELEPHONE ENCOUNTER
Confirmed upcomming procedure for 10/11/22  Patient states she was not given any prep information  She is requesting miralax/dulcolax as it is hard for her to tolerate preps  Please advise if miralax/dulcolax prep is okay to given patient  Should this be 2 day or just 1 day prep?

## 2022-10-04 NOTE — TELEPHONE ENCOUNTER
2 Day dulcolax/miralax prep sent via [employer]  Explained all procedure instructions via telephone  Patient expressed understanding

## 2022-10-11 ENCOUNTER — TELEPHONE (OUTPATIENT)
Dept: FAMILY MEDICINE CLINIC | Facility: CLINIC | Age: 67
End: 2022-10-11

## 2022-10-11 NOTE — TELEPHONE ENCOUNTER
CC: Wasp stings x 2    1  L Hand at base of thumb towards wrist w/ redness & swelling at wrist going up arm about 3"  2   R arm - Swollen, red & blotchy     - Used Ice last night & using benadryl today - swollen sore & itchy  Asking if there is anything else she can do?

## 2022-10-12 ENCOUNTER — OFFICE VISIT (OUTPATIENT)
Dept: URGENT CARE | Facility: CLINIC | Age: 67
End: 2022-10-12
Payer: MEDICARE

## 2022-10-12 ENCOUNTER — HOSPITAL ENCOUNTER (OUTPATIENT)
Facility: HOSPITAL | Age: 67
Setting detail: OBSERVATION
Discharge: HOME | End: 2022-10-13
Attending: EMERGENCY MEDICINE | Admitting: INTERNAL MEDICINE
Payer: MEDICARE

## 2022-10-12 VITALS
DIASTOLIC BLOOD PRESSURE: 71 MMHG | RESPIRATION RATE: 20 BRPM | OXYGEN SATURATION: 97 % | WEIGHT: 159 LBS | TEMPERATURE: 97.9 F | HEART RATE: 96 BPM | BODY MASS INDEX: 29.26 KG/M2 | SYSTOLIC BLOOD PRESSURE: 148 MMHG | HEIGHT: 62 IN

## 2022-10-12 DIAGNOSIS — T63.444A BEE STING, UNDETERMINED INTENT, INITIAL ENCOUNTER: ICD-10-CM

## 2022-10-12 DIAGNOSIS — T63.441A BEE STING: ICD-10-CM

## 2022-10-12 DIAGNOSIS — L03.90 CELLULITIS: Primary | ICD-10-CM

## 2022-10-12 DIAGNOSIS — L03.114 CELLULITIS OF LEFT ARM: Primary | ICD-10-CM

## 2022-10-12 PROBLEM — E78.5 DYSLIPIDEMIA: Status: ACTIVE | Noted: 2022-10-12

## 2022-10-12 LAB
ALBUMIN SERPL BCP-MCNC: 3.8 G/DL (ref 3.5–5)
ALP SERPL-CCNC: 88 U/L (ref 46–116)
ALT SERPL W P-5'-P-CCNC: 28 U/L (ref 12–78)
ANION GAP SERPL CALCULATED.3IONS-SCNC: 8 MMOL/L (ref 4–13)
AST SERPL W P-5'-P-CCNC: 28 U/L (ref 5–45)
BASOPHILS # BLD AUTO: 0.05 THOUSANDS/ÂΜL (ref 0–0.1)
BASOPHILS NFR BLD AUTO: 1 % (ref 0–1)
BILIRUB SERPL-MCNC: 0.5 MG/DL (ref 0.2–1)
BUN SERPL-MCNC: 25 MG/DL (ref 5–25)
CALCIUM SERPL-MCNC: 10.1 MG/DL (ref 8.3–10.1)
CHLORIDE SERPL-SCNC: 101 MMOL/L (ref 96–108)
CO2 SERPL-SCNC: 28 MMOL/L (ref 21–32)
CREAT SERPL-MCNC: 0.79 MG/DL (ref 0.6–1.3)
EOSINOPHIL # BLD AUTO: 0.34 THOUSAND/ÂΜL (ref 0–0.61)
EOSINOPHIL NFR BLD AUTO: 4 % (ref 0–6)
ERYTHROCYTE [DISTWIDTH] IN BLOOD BY AUTOMATED COUNT: 11.4 % (ref 11.6–15.1)
GFR SERPL CREATININE-BSD FRML MDRD: 77 ML/MIN/1.73SQ M
GLUCOSE SERPL-MCNC: 92 MG/DL (ref 65–140)
HCT VFR BLD AUTO: 48 % (ref 34.8–46.1)
HGB BLD-MCNC: 15.8 G/DL (ref 11.5–15.4)
IMM GRANULOCYTES # BLD AUTO: 0.02 THOUSAND/UL (ref 0–0.2)
IMM GRANULOCYTES NFR BLD AUTO: 0 % (ref 0–2)
LYMPHOCYTES # BLD AUTO: 2.17 THOUSANDS/ÂΜL (ref 0.6–4.47)
LYMPHOCYTES NFR BLD AUTO: 26 % (ref 14–44)
MCH RBC QN AUTO: 30.7 PG (ref 26.8–34.3)
MCHC RBC AUTO-ENTMCNC: 32.9 G/DL (ref 31.4–37.4)
MCV RBC AUTO: 93 FL (ref 82–98)
MONOCYTES # BLD AUTO: 0.82 THOUSAND/ÂΜL (ref 0.17–1.22)
MONOCYTES NFR BLD AUTO: 10 % (ref 4–12)
NEUTROPHILS # BLD AUTO: 5.01 THOUSANDS/ÂΜL (ref 1.85–7.62)
NEUTS SEG NFR BLD AUTO: 59 % (ref 43–75)
NRBC BLD AUTO-RTO: 0 /100 WBCS
PLATELET # BLD AUTO: 285 THOUSANDS/UL (ref 149–390)
PMV BLD AUTO: 10.9 FL (ref 8.9–12.7)
POTASSIUM SERPL-SCNC: 5.1 MMOL/L (ref 3.5–5.3)
PROT SERPL-MCNC: 7.9 G/DL (ref 6.4–8.4)
RBC # BLD AUTO: 5.14 MILLION/UL (ref 3.81–5.12)
SODIUM SERPL-SCNC: 137 MMOL/L (ref 135–147)
WBC # BLD AUTO: 8.41 THOUSAND/UL (ref 4.31–10.16)

## 2022-10-12 PROCEDURE — 99284 EMERGENCY DEPT VISIT MOD MDM: CPT

## 2022-10-12 PROCEDURE — 96374 THER/PROPH/DIAG INJ IV PUSH: CPT

## 2022-10-12 PROCEDURE — 99220 PR INITIAL OBSERVATION CARE/DAY 70 MINUTES: CPT | Performed by: INTERNAL MEDICINE

## 2022-10-12 PROCEDURE — G0463 HOSPITAL OUTPT CLINIC VISIT: HCPCS | Performed by: PHYSICIAN ASSISTANT

## 2022-10-12 PROCEDURE — 87040 BLOOD CULTURE FOR BACTERIA: CPT | Performed by: INTERNAL MEDICINE

## 2022-10-12 PROCEDURE — 85025 COMPLETE CBC W/AUTO DIFF WBC: CPT | Performed by: EMERGENCY MEDICINE

## 2022-10-12 PROCEDURE — 36415 COLL VENOUS BLD VENIPUNCTURE: CPT | Performed by: EMERGENCY MEDICINE

## 2022-10-12 PROCEDURE — 99203 OFFICE O/P NEW LOW 30 MIN: CPT | Performed by: PHYSICIAN ASSISTANT

## 2022-10-12 PROCEDURE — 99284 EMERGENCY DEPT VISIT MOD MDM: CPT | Performed by: EMERGENCY MEDICINE

## 2022-10-12 PROCEDURE — 80053 COMPREHEN METABOLIC PANEL: CPT | Performed by: EMERGENCY MEDICINE

## 2022-10-12 RX ORDER — ASCORBIC ACID 500 MG
500 TABLET ORAL DAILY
Status: DISCONTINUED | OUTPATIENT
Start: 2022-10-13 | End: 2022-10-13 | Stop reason: HOSPADM

## 2022-10-12 RX ORDER — CEFTRIAXONE 1 G/50ML
1000 INJECTION, SOLUTION INTRAVENOUS ONCE
Status: COMPLETED | OUTPATIENT
Start: 2022-10-12 | End: 2022-10-12

## 2022-10-12 RX ORDER — CEFTRIAXONE 1 G/50ML
1000 INJECTION, SOLUTION INTRAVENOUS EVERY 24 HOURS
Status: DISCONTINUED | OUTPATIENT
Start: 2022-10-13 | End: 2022-10-13 | Stop reason: HOSPADM

## 2022-10-12 RX ORDER — SODIUM CHLORIDE 9 MG/ML
100 INJECTION, SOLUTION INTRAVENOUS CONTINUOUS
Status: DISCONTINUED | OUTPATIENT
Start: 2022-10-12 | End: 2022-10-13 | Stop reason: HOSPADM

## 2022-10-12 RX ORDER — POLYETHYLENE GLYCOL 3350 17 G/17G
17 POWDER, FOR SOLUTION ORAL DAILY PRN
Status: DISCONTINUED | OUTPATIENT
Start: 2022-10-12 | End: 2022-10-13 | Stop reason: HOSPADM

## 2022-10-12 RX ORDER — DIPHENHYDRAMINE HCL 25 MG
25 TABLET ORAL EVERY 6 HOURS PRN
Status: DISCONTINUED | OUTPATIENT
Start: 2022-10-12 | End: 2022-10-13 | Stop reason: HOSPADM

## 2022-10-12 RX ORDER — ONDANSETRON 2 MG/ML
4 INJECTION INTRAMUSCULAR; INTRAVENOUS EVERY 6 HOURS PRN
Status: DISCONTINUED | OUTPATIENT
Start: 2022-10-12 | End: 2022-10-13 | Stop reason: HOSPADM

## 2022-10-12 RX ORDER — PANTOPRAZOLE SODIUM 40 MG/1
40 TABLET, DELAYED RELEASE ORAL
Status: DISCONTINUED | OUTPATIENT
Start: 2022-10-13 | End: 2022-10-13 | Stop reason: HOSPADM

## 2022-10-12 RX ORDER — ENOXAPARIN SODIUM 100 MG/ML
40 INJECTION SUBCUTANEOUS DAILY
Status: DISCONTINUED | OUTPATIENT
Start: 2022-10-13 | End: 2022-10-13 | Stop reason: HOSPADM

## 2022-10-12 RX ORDER — ACETAMINOPHEN 325 MG/1
650 TABLET ORAL EVERY 6 HOURS PRN
Status: DISCONTINUED | OUTPATIENT
Start: 2022-10-12 | End: 2022-10-13 | Stop reason: HOSPADM

## 2022-10-12 RX ORDER — MELATONIN
5000 DAILY
Status: DISCONTINUED | OUTPATIENT
Start: 2022-10-13 | End: 2022-10-13 | Stop reason: HOSPADM

## 2022-10-12 RX ADMIN — CEFTRIAXONE 1000 MG: 1 INJECTION, SOLUTION INTRAVENOUS at 17:15

## 2022-10-12 RX ADMIN — DIPHENHYDRAMINE HYDROCHLORIDE 25 MG: 25 TABLET ORAL at 22:39

## 2022-10-12 RX ADMIN — SODIUM CHLORIDE 100 ML/HR: 0.9 INJECTION, SOLUTION INTRAVENOUS at 20:16

## 2022-10-12 NOTE — ED PROVIDER NOTES
History  Chief Complaint   Patient presents with   • Rash     Patient states she was stung by a bee on Monday and now has a rash on her left arm  Patient was at urgent care and was sent here incase the patient needed IV antibiotics due to possible cellulitis  Patient complains of itching  Patient last took benadryl at 1045 this morning and has been using ice  To er with cocnern for cellulitis, she was stung by a bee on Monday, 3 days ago  She reported the sting to the right base of her thumb  She has erythremic streaking up from sting to the elbow region  No pain with passive rom to the elbow  The hand and wrist is swollen she states  nof ever, chills, does not feel sick, denies face swelling, gi sx, uti sx, sob  Prior to Admission Medications   Prescriptions Last Dose Informant Patient Reported? Taking?    Ascorbic Acid (VITAMIN C PO) 10/11/2022 at Unknown time Self Yes Yes   Sig: Take 500 mg by mouth daily     cholecalciferol (VITAMIN D3) 1,000 units tablet 10/11/2022 at Unknown time  Yes Yes   Sig: Take 5,000 Units by mouth daily     pantoprazole (PROTONIX) 40 mg tablet 10/12/2022 at Unknown time  No Yes   Sig: Take 1 tablet (40 mg total) by mouth daily      Facility-Administered Medications: None       Past Medical History:   Diagnosis Date   • Bell's palsy    • Colon polyp    • Hypertension    • Kidney calculi    • Spinal stenosis        Past Surgical History:   Procedure Laterality Date   • CHOLECYSTECTOMY     • COLONOSCOPY     • ENDOMETRIAL ABLATION     • LAMINECTOMY  06/14/2019    Lumbar spine - With placement of Trial device nonfusion device       Family History   Problem Relation Age of Onset   • Dementia Mother    • Heart disease Mother    • Emphysema Father    • No Known Problems Daughter    • No Known Problems Maternal Grandmother    • No Known Problems Maternal Grandfather    • No Known Problems Paternal Grandmother    • No Known Problems Paternal Grandfather    • Colon cancer Maternal Uncle    • No Known Problems Maternal Uncle    • No Known Problems Maternal Uncle    • No Known Problems Maternal Uncle    • No Known Problems Maternal Uncle    • No Known Problems Son    • No Known Problems Brother      I have reviewed and agree with the history as documented  E-Cigarette/Vaping   • E-Cigarette Use Never User      E-Cigarette/Vaping Substances     Social History     Tobacco Use   • Smoking status: Never Smoker   • Smokeless tobacco: Never Used   Vaping Use   • Vaping Use: Never used   Substance Use Topics   • Alcohol use: Never     Comment: occasionally   • Drug use: Never       Review of Systems   All other systems reviewed and are negative  Physical Exam  Physical Exam  Constitutional:       General: She is not in acute distress  Appearance: She is well-developed  She is not ill-appearing, toxic-appearing or diaphoretic  HENT:      Head: Normocephalic and atraumatic  Right Ear: External ear normal       Left Ear: External ear normal       Nose: Nose normal       Mouth/Throat:      Mouth: Mucous membranes are moist    Eyes:      General:         Right eye: No discharge  Left eye: No discharge  Pupils: Pupils are equal, round, and reactive to light  Neck:      Vascular: No JVD  Cardiovascular:      Rate and Rhythm: Normal rate and regular rhythm  Pulses: Normal pulses  Heart sounds: Normal heart sounds  No murmur heard  No friction rub  No gallop  Pulmonary:      Effort: Pulmonary effort is normal  No respiratory distress  Breath sounds: Normal breath sounds  No stridor  No wheezing, rhonchi or rales  Chest:      Chest wall: No tenderness  Abdominal:      General: Abdomen is flat  Bowel sounds are normal  There is no distension  Palpations: Abdomen is soft  There is no mass  Tenderness: There is no abdominal tenderness  There is no guarding or rebound  Hernia: No hernia is present     Musculoskeletal:         General: No swelling, tenderness, deformity or signs of injury  Normal range of motion  Cervical back: Normal range of motion and neck supple  Right lower leg: No edema  Left lower leg: No edema  Skin:     General: Skin is warm  Capillary Refill: Capillary refill takes less than 2 seconds  Coloration: Skin is not jaundiced or pale  Findings: Erythema present  No bruising, lesion or rash  Comments: No stinger noted at sting site  There is some mild swelling tot he hand and wrist region  strong hand   There is erythremia to the ant wrist that extends up to the elbow in a streaking like fashion  Neurological:      General: No focal deficit present  Mental Status: She is alert and oriented to person, place, and time  Cranial Nerves: No cranial nerve deficit  Sensory: No sensory deficit  Motor: No weakness or abnormal muscle tone        Coordination: Coordination normal       Gait: Gait normal       Deep Tendon Reflexes: Reflexes normal    Psychiatric:         Mood and Affect: Mood normal          Vital Signs  ED Triage Vitals [10/12/22 1535]   Temperature Pulse Respirations Blood Pressure SpO2   98 4 °F (36 9 °C) 73 17 144/82 99 %      Temp Source Heart Rate Source Patient Position - Orthostatic VS BP Location FiO2 (%)   Temporal Monitor -- -- --      Pain Score       No Pain           Vitals:    10/12/22 1746 10/12/22 1747 10/12/22 2106 10/13/22 0705   BP: 142/88 142/88 128/77 119/76   Pulse: 83 73 68 61         Visual Acuity      ED Medications  Medications   cefTRIAXone (ROCEPHIN) IVPB (premix in dextrose) 1,000 mg 50 mL (1,000 mg Intravenous New Bag 10/12/22 1715)   diphenhydrAMINE (BENADRYL) injection 25 mg (25 mg Intravenous Given 10/13/22 0314)       Diagnostic Studies  Results Reviewed     Procedure Component Value Units Date/Time    Comprehensive metabolic panel [[id number]] Collected: 10/12/22 1601    Lab Status: Final result Specimen: Blood from Arm, Right Updated: 10/12/22 1635     Sodium 137 mmol/L      Potassium 5 1 mmol/L      Chloride 101 mmol/L      CO2 28 mmol/L      ANION GAP 8 mmol/L      BUN 25 mg/dL      Creatinine 0 79 mg/dL      Glucose 92 mg/dL      Calcium 10 1 mg/dL      AST 28 U/L      ALT 28 U/L      Alkaline Phosphatase 88 U/L      Total Protein 7 9 g/dL      Albumin 3 8 g/dL      Total Bilirubin 0 50 mg/dL      eGFR 77 ml/min/1 73sq m     Narrative:      [place] guidelines for Chronic Kidney Disease (CKD):   •  Stage 1 with normal or high GFR (GFR > 90 mL/min/1 73 square meters)  •  Stage 2 Mild CKD (GFR = 60-89 mL/min/1 73 square meters)  •  Stage 3A Moderate CKD (GFR = 45-59 mL/min/1 73 square meters)  •  Stage 3B Moderate CKD (GFR = 30-44 mL/min/1 73 square meters)  •  Stage 4 Severe CKD (GFR = 15-29 mL/min/1 73 square meters)  •  Stage 5 End Stage CKD (GFR <15 mL/min/1 73 square meters)  Note: GFR calculation is accurate only with a steady state creatinine    CBC and differential [[id number]]  (Abnormal) Collected: 10/12/22 1601    Lab Status: Final result Specimen: Blood from Arm, Right Updated: 10/12/22 1606     WBC 8 41 Thousand/uL      RBC 5 14 Million/uL      Hemoglobin 15 8 g/dL      Hematocrit 48 0 %      MCV 93 fL      MCH 30 7 pg      MCHC 32 9 g/dL      RDW 11 4 %      MPV 10 9 fL      Platelets 420 Thousands/uL      nRBC 0 /100 WBCs      Neutrophils Relative 59 %      Immat GRANS % 0 %      Lymphocytes Relative 26 %      Monocytes Relative 10 %      Eosinophils Relative 4 %      Basophils Relative 1 %      Neutrophils Absolute 5 01 Thousands/µL      Immature Grans Absolute 0 02 Thousand/uL      Lymphocytes Absolute 2 17 Thousands/µL      Monocytes Absolute 0 82 Thousand/µL      Eosinophils Absolute 0 34 Thousand/µL      Basophils Absolute 0 05 Thousands/µL                  No orders to display              Procedures  Procedures         ED Course                               SBIRT 20yo+    Flowsheet Row Most Recent Value   SBIRT (24 yo +)    In order to provide better care to our patients, we are screening all of our patients for alcohol and drug use  Would it be okay to ask you these screening questions? No Filed at: 10/12/2022 1605                    [facility]  Number of Diagnoses or Management Options  Diagnosis management comments: To er from urgent care with cellultitis 3 days after bee sting  The hand is rather swollen, she has streaking of erythemia up to the elbow fossa  No fever, labs reassuring  I suspect she will require 1-2 days of iv abx  Discussed case with dr Lacey Henry who accepted  Disposition  Final diagnoses:   Cellulitis   Bee sting     Time reflects when diagnosis was documented in both MDM as applicable and the Disposition within this note     Time User Action Codes Description Comment    10/12/2022  5:19 PM Marivel Atkinson [L03 [id number]] Cellulitis     10/12/2022  5:19 PM Marivel Atkinson [[id number]] Bee sting       ED Disposition     ED Disposition   Admit    Condition   Stable    Date/Time   Wed Oct 12, 2022  5:19 PM    Comment   Case was discussed with anna and the patient's admission status was agreed to be Admission Status: observation status to the service of Dr Nichole Nam              Follow-up Information     Follow up With Specialties Details Why Contact Claire Escalante DO Family Medicine Follow up in 1 week(s)  [facility] [address] [phone]            Discharge Medication List as of 10/13/2022  1:10 PM      START taking these medications    Details   acetaminophen (TYLENOL) 325 mg tablet Take 2 tablets (650 mg total) by mouth every 6 (six) hours as needed for mild pain, moderate pain, headaches or fever, Starting Thu 10/13/2022, No Print      cephalexin (KEFLEX) 500 mg capsule Take 1 capsule (500 mg total) by mouth every 6 (six) hours for 6 days, Starting Thu 10/13/2022, Until Wed 10/19/2022, Normal         CONTINUE these medications which have NOT CHANGED    Details   Ascorbic Acid (VITAMIN C PO) Take 500 mg by mouth daily  , Historical Med      cholecalciferol (VITAMIN D3) 1,000 units tablet Take 5,000 Units by mouth daily  , Historical Med      pantoprazole (PROTONIX) 40 mg tablet Take 1 tablet (40 mg total) by mouth daily, Starting Tue 8/30/2022, Normal             Outpatient Discharge Orders   Discharge Diet     No strenuous exercise     Call provider for:  redness, tenderness, or signs of infection (pain, swelling, redness, odor or green/yellow discharge around incision site)     Call provider for:  severe uncontrolled pain     Call provider for:     Call provider for:  hives       PDMP Review     None          ED Provider  Electronically Signed by           Bao Carvajal MD  10/15/22 [id number]

## 2022-10-12 NOTE — TELEPHONE ENCOUNTER
Pt called back wondering what to do about her arm - I recommended that Pt go to Urgent Care or I could transfer her to the  to check on an appt with the PA

## 2022-10-12 NOTE — PATIENT INSTRUCTIONS
Cellulitis   WHAT YOU NEED TO KNOW:   Cellulitis is a skin infection caused by bacteria  Cellulitis is common and can become severe  Cellulitis usually appears on the lower legs  It can also appear on the arms, face, and other areas  Cellulitis develops when bacteria enter a crack or break in your skin, such as a scratch, bite, or cut  DISCHARGE INSTRUCTIONS:   Return to the emergency department if:   Your wound gets larger and more painful  You feel a crackling under your skin when you touch it  You have purple dots or bumps on your skin, or you see bleeding under your skin  You see red streaks coming from the infected area  Call your doctor if:   The red, warm, swollen area gets larger  Your fever or pain does not go away or gets worse  The area does not get smaller after 3 days of antibiotics  You have questions or concerns about your condition or care  Medicines: You should start to see improvement in 3 days  If cellulitis is not treated, the infection can spread through your body and become life-threatening  You may need any of the following medicines:  Antibiotics  help treat the bacterial infection  Acetaminophen  decreases pain and fever  It is available without a doctor's order  Ask how much to take and how often to take it  Follow directions  Read the labels of all other medicines you are using to see if they also contain acetaminophen, or ask your doctor or pharmacist  Acetaminophen can cause liver damage if not taken correctly  Do not use more than 4 grams (4,000 milligrams) total of acetaminophen in one day  NSAIDs , such as ibuprofen, help decrease swelling, pain, and fever  This medicine is available with or without a doctor's order  NSAIDs can cause stomach bleeding or kidney problems in certain people  If you take blood thinner medicine, always ask your healthcare provider if NSAIDs are safe for you  Always read the medicine label and follow directions      Take your medicine as directed  Contact your healthcare provider if you think your medicine is not helping or if you have side effects  Tell him or her if you are allergic to any medicine  Keep a list of the medicines, vitamins, and herbs you take  Include the amounts, and when and why you take them  Bring the list or the pill bottles to follow-up visits  Carry your medicine list with you in case of an emergency  Self-care:   Wash the area with soap and water every day  Gently pat dry  Use bandages if directed by your healthcare provider  Elevate the area above the level of your heart  as often as you can  This will help decrease swelling and pain  Prop the area on pillows or blankets to keep it elevated comfortably  Place a cool, damp cloth on the area  Use clean cloths and clean water  You can do this as often as you need to  Cool, damp cloths may help decrease pain  Apply cream or ointment as directed  These help protect the area  Most over-the-counter products, such as petroleum jelly, are good to use  Ask your healthcare provider about specific creams or ointments you should use  Prevent cellulitis:   Do not scratch bug bites or areas of injury  You increase your risk for cellulitis by scratching these areas  Do not share personal items, such as towels, clothing, and razors  Clean exercise equipment  with germ-killing  before and after you use it  Treat athlete's foot  This can help prevent the spread of a bacterial skin infection  Wash your hands often  Use soap and water  Wash your hands after you use the bathroom, change a child's diapers, or sneeze  Wash your hands before you prepare or eat food  Use lotion to prevent dry, cracked skin  Follow up with your doctor within 3 days, or as directed:  He or she will check if your cellulitis is getting better  Write down your questions so you remember to ask them during your visits    © Copyright [employer] 2022 Information is for End User's use only and may not be sold, redistributed or otherwise used for commercial purposes  All illustrations and images included in CareNotes® are the copyrighted property of A D A M , Inc  or Darby Raines  The above information is an  only  It is not intended as medical advice for individual conditions or treatments  Talk to your doctor, nurse or pharmacist before following any medical regimen to see if it is safe and effective for you

## 2022-10-12 NOTE — ASSESSMENT & PLAN NOTE
Rash on bilateral upper extremities extending from site of insect bites (Bee)  Area is have been rapidly expanding, erythematous, warm to touch, with underlying swelling of the extremities - findings are consistent with cellulitis  Patient is afebrile, non tachycardic, and without leukocytosis - no evidence of systemic illness, sirs, or sepsis  Initiate on Ceftriaxone and monitor closely  Will also obtain blood cultures  Low threshold for imaging or expanding coverage

## 2022-10-12 NOTE — H&P
[address] 1955, 79 y o  female MRN: [id number]  Unit/Bed#: 410-01 Encounter: [id number]  Primary Care Provider: Pilar Kong DO   Date and time admitted to hospital: 10/12/2022  3:19 PM    * Cellulitis of left upper extremity  Assessment & Plan  Rash on bilateral upper extremities extending from site of insect bites (Bee)  Area is have been rapidly expanding, erythematous, warm to touch, with underlying swelling of the extremities - findings are consistent with cellulitis  Patient is afebrile, non tachycardic, and without leukocytosis - no evidence of systemic illness, sirs, or sepsis  Initiate on Ceftriaxone and monitor closely  Will also obtain blood cultures  Low threshold for imaging or expanding coverage  Gastroesophageal reflux disease  Assessment & Plan  Continue PPI therapy  Dyslipidemia  Assessment & Plan  Continue home statin therapy  VTE Pharmacologic Prophylaxis: VTE Score: 4 High Risk (Score >/= 5) - Pharmacological DVT Prophylaxis Ordered: enoxaparin (Lovenox)  Sequential Compression Devices Ordered  Code Status: Level 1 - Full Code   Discussion with family: Updated  () at bedside  Anticipated Length of Stay: Patient will be admitted on an observation basis with an anticipated length of stay of less than 2 midnights secondary to Treatment of upper extremity cellulitis       Total Time for Visit, including Counseling / Coordination of Care: 45 minutes Greater than 50% of this total time spent on direct patient counseling and coordination of care  Chief Complaint:  Arm pain and swelling    History of Present Illness:  Jeffrey De La Paz is a 79 y o  female being admitted from [facility] ED with a diagnosis of upper extremity cellulitis  Mrs Juan Padilla has past medical history significant for BPPV, GERD, dyslipidemia, constipation    There is also mention of Bell's palsy and prior infection with COVID-19  The patient reports being bitten by a bee on her left hand and right forearm 2 days ago  She then began to experience redness, pain, and warmth surrounding those 2 sites the day after the initial injury  She contacted her doctor's office but without response  Today she reports that both areas began to expand, especially involving her left arm, with erythema warmth and swelling that quickly spread up her arm  She initially went to the urgent care clinic, then sent to the ED for further evaluation  Workup in the ED was essentially unremarkable, with patient being afebrile, non hypotensive, and non tachycardic and without leukocytosis  Imaging was not performed  However, given the significant areas of erythema she was admitted for initiation of IV antibiotics and close monitoring overnight  Review of Systems:  Review of Systems   Constitutional: Negative for chills and fever  Respiratory: Negative for shortness of breath and wheezing  Cardiovascular: Negative for chest pain  Gastrointestinal: Negative for abdominal pain  Skin: Positive for color change  Erythema and swelling of b/l UE's, L>R       Past Medical and Surgical History:   Past Medical History:   Diagnosis Date   • Bell's palsy    • Colon polyp    • Hypertension    • Kidney calculi    • Spinal stenosis        Past Surgical History:   Procedure Laterality Date   • CHOLECYSTECTOMY     • COLONOSCOPY     • ENDOMETRIAL ABLATION     • LAMINECTOMY  06/14/2019    Lumbar spine - With placement of Trial device nonfusion device       Meds/Allergies:  Prior to Admission medications    Medication Sig Start Date End Date Taking?  Authorizing Provider   Ascorbic Acid (VITAMIN C PO) Take 500 mg by mouth daily     Yes Historical Provider, MD   cholecalciferol (VITAMIN D3) 1,000 units tablet Take 5,000 Units by mouth daily     Yes Historical Provider, MD   pantoprazole (PROTONIX) 40 mg tablet Take 1 tablet (40 mg total) by mouth daily 8/30/22  Yes PIA Garcia   atorvastatin (LIPITOR) 40 mg tablet Take 1 tablet (40 mg total) by mouth daily with dinner for 10 days 11/9/21 10/12/22  Pierce Mccain MD   polyethylene glycol (GOLYTELY) 4000 mL solution Take 4,000 mL by mouth once for 1 dose 7/31/22 10/12/22  Amaya Horn MD   polyethylene glycol (GOLYTELY) 4000 mL solution Take 4,000 mL by mouth once for 1 dose 8/30/22 10/12/22  PIA Garcia   Probiotic Product (PROBIOTIC PO) Take by mouth in the morning  10/12/22  Historical Provider, MD     I have reviewed home medications using recent Epic encounter  Allergies:    Allergies   Allergen Reactions   • Tramadol Vomiting   • Ciprofloxacin Other (See Comments)     unknown   • Lisinopril      Pt had one dose and became became presyncopal   • Prednisone GI Intolerance       Social History:  Marital Status: /Civil Union   Occupation:  Works part-time making [employer]  Patient Pre-hospital Living Situation: With spouse  Patient Pre-hospital Level of Mobility: walks  Patient Pre-hospital Diet Restrictions:  None  Substance Use History:   Social History     Substance and Sexual Activity   Alcohol Use Never    Comment: occasionally     Social History     Tobacco Use   Smoking Status Never Smoker   Smokeless Tobacco Never Used     Social History     Substance and Sexual Activity   Drug Use Never       Family History:  Family History   Problem Relation Age of Onset   • Dementia Mother    • Heart disease Mother    • Emphysema Father    • No Known Problems Daughter    • No Known Problems Maternal Grandmother    • No Known Problems Maternal Grandfather    • No Known Problems Paternal Grandmother    • No Known Problems Paternal Grandfather    • Colon cancer Maternal Uncle    • No Known Problems Maternal Uncle    • No Known Problems Maternal Uncle    • No Known Problems Maternal Uncle    • No Known Problems Maternal Uncle    • No Known Problems Son    • No Known Problems Brother Physical Exam:     Vitals:   Blood Pressure: 142/88 (10/12/22 1747)  Pulse: 73 (10/12/22 1747)  Temperature: (!) 97 4 °F (36 3 °C) (10/12/22 1747)  Temp Source: Temporal (10/12/22 1535)  Respirations: 17 (10/12/22 1535)  Height: 5' 2" (157 5 cm) (10/12/22 1746)  Weight - Scale: 72 1 kg (158 lb 15 2 oz) (10/12/22 1746)  SpO2: 96 % (10/12/22 1834)    Physical Exam  Vitals reviewed  Constitutional:       General: She is not in acute distress  Appearance: Normal appearance  She is normal weight  She is not ill-appearing or toxic-appearing  HENT:      Mouth/Throat:      Mouth: Mucous membranes are moist    Cardiovascular:      Rate and Rhythm: Normal rate and regular rhythm  Heart sounds: No murmur heard  No gallop  Pulmonary:      Effort: Pulmonary effort is normal  No respiratory distress  Breath sounds: Normal breath sounds  No wheezing, rhonchi or rales  Abdominal:      General: Abdomen is flat  Bowel sounds are normal  There is no distension  Palpations: Abdomen is soft  Tenderness: There is no abdominal tenderness  There is no guarding or rebound  Musculoskeletal:         General: Swelling present  No tenderness  Cervical back: Neck supple  Skin:     General: Skin is warm and dry  Findings: Erythema present  Comments: Large area of erythema, warmth, and edema involving the left upper extremity, small area on the right  Area extends from the thumb and back of the left hand up to the proximal forearm  Area is clearly demarcated, without evidence of induration or underlying fluctuance  Smaller area on right distal dorsal forearm, again with erythema and warmth, but not significantly indurated  Neurological:      General: No focal deficit present  Mental Status: She is alert and oriented to person, place, and time     Psychiatric:         Mood and Affect: Mood normal          Behavior: Behavior normal        Additional Data:     Lab Results:  Results from last 7 days   Lab Units 10/12/22  1601   WBC Thousand/uL 8 41   HEMOGLOBIN g/dL 15 8*   HEMATOCRIT % 48 0*   PLATELETS Thousands/uL 285   NEUTROS PCT % 59   LYMPHS PCT % 26   MONOS PCT % 10   EOS PCT % 4     Results from last 7 days   Lab Units 10/12/22  1601   SODIUM mmol/L 137   POTASSIUM mmol/L 5 1   CHLORIDE mmol/L 101   CO2 mmol/L 28   BUN mg/dL 25   CREATININE mg/dL 0 79   ANION GAP mmol/L 8   CALCIUM mg/dL 10 1   ALBUMIN g/dL 3 8   TOTAL BILIRUBIN mg/dL 0 50   ALK PHOS U/L 88   ALT U/L 28   AST U/L 28   GLUCOSE RANDOM mg/dL 92     Imaging: No pertinent imaging reviewed  No orders to display       ** Please Note: This note has been constructed using a voice recognition system   **

## 2022-10-12 NOTE — PROGRESS NOTES
[facility]        NAME: Tonie Batres is a 79 y o  female  : 1955    MRN: [id number]  DATE: 2022  TIME: 2:32 PM    Assessment and Plan   Cellulitis of left arm [L03 114]  1  Cellulitis of left arm     2  Bee sting, undetermined intent, initial encounter           Patient Instructions   Patient Instructions     Cellulitis   WHAT YOU NEED TO KNOW:   Cellulitis is a skin infection caused by bacteria  Cellulitis is common and can become severe  Cellulitis usually appears on the lower legs  It can also appear on the arms, face, and other areas  Cellulitis develops when bacteria enter a crack or break in your skin, such as a scratch, bite, or cut  DISCHARGE INSTRUCTIONS:   Return to the emergency department if:   · Your wound gets larger and more painful  · You feel a crackling under your skin when you touch it  · You have purple dots or bumps on your skin, or you see bleeding under your skin  · You see red streaks coming from the infected area  Call your doctor if:   · The red, warm, swollen area gets larger  · Your fever or pain does not go away or gets worse  · The area does not get smaller after 3 days of antibiotics  · You have questions or concerns about your condition or care  Medicines: You should start to see improvement in 3 days  If cellulitis is not treated, the infection can spread through your body and become life-threatening  You may need any of the following medicines:  · Antibiotics  help treat the bacterial infection  · Acetaminophen  decreases pain and fever  It is available without a doctor's order  Ask how much to take and how often to take it  Follow directions  Read the labels of all other medicines you are using to see if they also contain acetaminophen, or ask your doctor or pharmacist  Acetaminophen can cause liver damage if not taken correctly  Do not use more than 4 grams (4,000 milligrams) total of acetaminophen in one day  · NSAIDs , such as ibuprofen, help decrease swelling, pain, and fever  This medicine is available with or without a doctor's order  NSAIDs can cause stomach bleeding or kidney problems in certain people  If you take blood thinner medicine, always ask your healthcare provider if NSAIDs are safe for you  Always read the medicine label and follow directions  · Take your medicine as directed  Contact your healthcare provider if you think your medicine is not helping or if you have side effects  Tell him or her if you are allergic to any medicine  Keep a list of the medicines, vitamins, and herbs you take  Include the amounts, and when and why you take them  Bring the list or the pill bottles to follow-up visits  Carry your medicine list with you in case of an emergency  Self-care:   · Wash the area with soap and water every day  Gently pat dry  Use bandages if directed by your healthcare provider  · Elevate the area above the level of your heart  as often as you can  This will help decrease swelling and pain  Prop the area on pillows or blankets to keep it elevated comfortably  · Place a cool, damp cloth on the area  Use clean cloths and clean water  You can do this as often as you need to  Cool, damp cloths may help decrease pain  · Apply cream or ointment as directed  These help protect the area  Most over-the-counter products, such as petroleum jelly, are good to use  Ask your healthcare provider about specific creams or ointments you should use  Prevent cellulitis:   · Do not scratch bug bites or areas of injury  You increase your risk for cellulitis by scratching these areas  · Do not share personal items, such as towels, clothing, and razors  · Clean exercise equipment  with germ-killing  before and after you use it  · Treat athlete's foot  This can help prevent the spread of a bacterial skin infection  · Wash your hands often  Use soap and water   Wash your hands after you use the bathroom, change a child's diapers, or sneeze  Wash your hands before you prepare or eat food  Use lotion to prevent dry, cracked skin  Follow up with your doctor within 3 days, or as directed:  He or she will check if your cellulitis is getting better  Write down your questions so you remember to ask them during your visits  © Copyright [employer] 2022 Information is for End User's use only and may not be sold, redistributed or otherwise used for commercial purposes  All illustrations and images included in CareNotes® are the copyrighted property of A D A M , Inc  or Darby Onofre   The above information is an  only  It is not intended as medical advice for individual conditions or treatments  Talk to your doctor, nurse or pharmacist before following any medical regimen to see if it is safe and effective for you  Follow up with PCP in 3-5 days  Proceed to  ER if symptoms worsen  Chief Complaint     Chief Complaint   Patient presents with   • Insect Bite         History of Present Illness       The patient is a 45-year-old female who presents to the clinic complaining of swelling of the left upper forearm as well as swelling of the right forearm  She states that she started to notice pain in her left forearm on Monday while she was in her kitchen  She states that she noticed an area of swelling at the area of her pain  She states she started to get associated itching  She noticed swelling 24 hours later  She states the swelling has progressively become much worse within the last 24 hours  She initially did not have any medication to take but did  Benadryl and has been taking it for the past 24 hours  She has been taking 50 mg every 6 hours for the past 24 hours which does not seem to be helping with her symptoms  She complains of severe itching as well as a burning pain traveling upper arm    She states the current pain level is approximately 7/10  She is also complaining of streaking of her left arm  She denies associated swelling of her face, swelling of her tongue, swelling of her lips, wheezing, or shortness of breath  She does not have a history of allergy to bee stings but believes that she was stung by a bee as she states that she found a wasps in her kitchen later during the day  She states that she has been scratching her left arm due to the itching  Review of Systems   Review of Systems   Constitutional: Negative for activity change and chills  Musculoskeletal: Negative for arthralgias and joint swelling  Skin: Positive for color change and rash  Neurological: Negative for dizziness and headaches           Current Medications       Current Outpatient Medications:   •  Ascorbic Acid (VITAMIN C PO), Take 500 mg by mouth daily  , Disp: , Rfl:   •  cholecalciferol (VITAMIN D3) 1,000 units tablet, Take 5,000 Units by mouth daily  , Disp: , Rfl:   •  pantoprazole (PROTONIX) 40 mg tablet, Take 1 tablet (40 mg total) by mouth daily, Disp: 30 tablet, Rfl: 7  •  Probiotic Product (PROBIOTIC PO), Take by mouth in the morning, Disp: , Rfl:   •  atorvastatin (LIPITOR) 40 mg tablet, Take 1 tablet (40 mg total) by mouth daily with dinner for 10 days, Disp: 10 tablet, Rfl: 0  •  polyethylene glycol (GOLYTELY) 4000 mL solution, Take 4,000 mL by mouth once for 1 dose, Disp: 4000 mL, Rfl: 0  •  polyethylene glycol (GOLYTELY) 4000 mL solution, Take 4,000 mL by mouth once for 1 dose, Disp: 4000 mL, Rfl: 0    Current Allergies     Allergies as of 10/12/2022 - Reviewed 10/12/2022   Allergen Reaction Noted   • Tramadol Vomiting 04/28/2016   • Ciprofloxacin Other (See Comments) 04/28/2016   • Lisinopril  12/09/2017   • Prednisone GI Intolerance 02/29/2016            The following portions of the patient's history were reviewed and updated as appropriate: allergies, current medications, past family history, past medical history, past social history, past surgical history and problem list      Past Medical History:   Diagnosis Date   • Bell's palsy    • Colon polyp    • Hypertension    • Kidney calculi    • Spinal stenosis        Past Surgical History:   Procedure Laterality Date   • CHOLECYSTECTOMY     • COLONOSCOPY     • ENDOMETRIAL ABLATION     • LAMINECTOMY  06/14/2019    Lumbar spine - With placement of Trial device nonfusion device       Family History   Problem Relation Age of Onset   • Dementia Mother    • Heart disease Mother    • Emphysema Father    • No Known Problems Daughter    • No Known Problems Maternal Grandmother    • No Known Problems Maternal Grandfather    • No Known Problems Paternal Grandmother    • No Known Problems Paternal Grandfather    • Colon cancer Maternal Uncle    • No Known Problems Maternal Uncle    • No Known Problems Maternal Uncle    • No Known Problems Maternal Uncle    • No Known Problems Maternal Uncle    • No Known Problems Son    • No Known Problems Brother          Medications have been verified  Objective   /71   Pulse 96   Temp 97 9 °F (36 6 °C)   Resp 20   Ht 5' 2" (1 575 m)   Wt 72 1 kg (159 lb)   SpO2 97%   BMI 29 08 kg/m²        Physical Exam     Physical Exam  HENT:      Head:      Jaw: No trismus or tenderness  Right Ear: Tympanic membrane normal       Left Ear: Tympanic membrane normal       Mouth/Throat:      Mouth: Mucous membranes are moist  No angioedema  Dentition: No gingival swelling, dental caries or dental abscesses  Pharynx: No posterior oropharyngeal erythema or uvula swelling  Musculoskeletal:      Right forearm: Swelling and tenderness present  Left forearm: Swelling, edema and tenderness present  Right wrist: Swelling and tenderness present  Left wrist: Swelling and tenderness present  Decreased range of motion  Right hand: No swelling  Left hand: No swelling          Arms:       Comments: -there is an area swelling noted on the dorsal aspect of the left hand with a bullous noted in this area which resembles a bee sting  There is surrounding erythema and edema  The area is very warm to the touch  There is edema noted on the entire palmar side of the left forearm with streaking noted to the antecubital space of the left arm  There is another area of erythema noted on the right palm or wrist   This area is erythematous, warm to the touch and tender  Skin:     Findings: Erythema and rash present  Neurological:      Mental Status: She is alert          -patient was instructed to go the ER for further evaluation as her symptoms are suspicious for infection  I made the ER nurse aware

## 2022-10-13 VITALS
RESPIRATION RATE: 18 BRPM | OXYGEN SATURATION: 98 % | BODY MASS INDEX: 29.25 KG/M2 | HEART RATE: 61 BPM | WEIGHT: 158.95 LBS | HEIGHT: 62 IN | SYSTOLIC BLOOD PRESSURE: 119 MMHG | TEMPERATURE: 97.9 F | DIASTOLIC BLOOD PRESSURE: 76 MMHG

## 2022-10-13 LAB
ANION GAP SERPL CALCULATED.3IONS-SCNC: 10 MMOL/L (ref 4–13)
BASOPHILS # BLD AUTO: 0.03 THOUSANDS/ÂΜL (ref 0–0.1)
BASOPHILS NFR BLD AUTO: 0 % (ref 0–1)
BUN SERPL-MCNC: 17 MG/DL (ref 5–25)
CALCIUM SERPL-MCNC: 9 MG/DL (ref 8.3–10.1)
CHLORIDE SERPL-SCNC: 107 MMOL/L (ref 96–108)
CO2 SERPL-SCNC: 25 MMOL/L (ref 21–32)
CREAT SERPL-MCNC: 0.79 MG/DL (ref 0.6–1.3)
EOSINOPHIL # BLD AUTO: 0.5 THOUSAND/ÂΜL (ref 0–0.61)
EOSINOPHIL NFR BLD AUTO: 7 % (ref 0–6)
ERYTHROCYTE [DISTWIDTH] IN BLOOD BY AUTOMATED COUNT: 11.3 % (ref 11.6–15.1)
GFR SERPL CREATININE-BSD FRML MDRD: 77 ML/MIN/1.73SQ M
GLUCOSE SERPL-MCNC: 95 MG/DL (ref 65–140)
HCT VFR BLD AUTO: 43.6 % (ref 34.8–46.1)
HGB BLD-MCNC: 14.7 G/DL (ref 11.5–15.4)
IMM GRANULOCYTES # BLD AUTO: 0.02 THOUSAND/UL (ref 0–0.2)
IMM GRANULOCYTES NFR BLD AUTO: 0 % (ref 0–2)
LYMPHOCYTES # BLD AUTO: 2.05 THOUSANDS/ÂΜL (ref 0.6–4.47)
LYMPHOCYTES NFR BLD AUTO: 30 % (ref 14–44)
MAGNESIUM SERPL-MCNC: 1.8 MG/DL (ref 1.6–2.6)
MCH RBC QN AUTO: 31.1 PG (ref 26.8–34.3)
MCHC RBC AUTO-ENTMCNC: 33.7 G/DL (ref 31.4–37.4)
MCV RBC AUTO: 92 FL (ref 82–98)
MONOCYTES # BLD AUTO: 0.76 THOUSAND/ÂΜL (ref 0.17–1.22)
MONOCYTES NFR BLD AUTO: 11 % (ref 4–12)
NEUTROPHILS # BLD AUTO: 3.46 THOUSANDS/ÂΜL (ref 1.85–7.62)
NEUTS SEG NFR BLD AUTO: 52 % (ref 43–75)
NRBC BLD AUTO-RTO: 0 /100 WBCS
PLATELET # BLD AUTO: 243 THOUSANDS/UL (ref 149–390)
PMV BLD AUTO: 9.7 FL (ref 8.9–12.7)
POTASSIUM SERPL-SCNC: 4.1 MMOL/L (ref 3.5–5.3)
RBC # BLD AUTO: 4.73 MILLION/UL (ref 3.81–5.12)
SODIUM SERPL-SCNC: 142 MMOL/L (ref 135–147)
WBC # BLD AUTO: 6.82 THOUSAND/UL (ref 4.31–10.16)

## 2022-10-13 PROCEDURE — 80048 BASIC METABOLIC PNL TOTAL CA: CPT | Performed by: INTERNAL MEDICINE

## 2022-10-13 PROCEDURE — 83735 ASSAY OF MAGNESIUM: CPT | Performed by: INTERNAL MEDICINE

## 2022-10-13 PROCEDURE — 85025 COMPLETE CBC W/AUTO DIFF WBC: CPT | Performed by: INTERNAL MEDICINE

## 2022-10-13 PROCEDURE — 99217 PR OBSERVATION CARE DISCHARGE MANAGEMENT: CPT | Performed by: INTERNAL MEDICINE

## 2022-10-13 RX ORDER — CEPHALEXIN 500 MG/1
500 CAPSULE ORAL EVERY 6 HOURS SCHEDULED
Qty: 24 CAPSULE | Refills: 0 | Status: SHIPPED | OUTPATIENT
Start: 2022-10-13 | End: 2022-10-19

## 2022-10-13 RX ORDER — ACETAMINOPHEN 325 MG/1
650 TABLET ORAL EVERY 6 HOURS PRN
Refills: 0
Start: 2022-10-13

## 2022-10-13 RX ORDER — DIPHENHYDRAMINE HYDROCHLORIDE 50 MG/ML
25 INJECTION INTRAMUSCULAR; INTRAVENOUS ONCE
Status: COMPLETED | OUTPATIENT
Start: 2022-10-13 | End: 2022-10-13

## 2022-10-13 RX ADMIN — PANTOPRAZOLE SODIUM 40 MG: 40 TABLET, DELAYED RELEASE ORAL at 05:42

## 2022-10-13 RX ADMIN — ENOXAPARIN SODIUM 40 MG: 40 INJECTION SUBCUTANEOUS at 08:32

## 2022-10-13 RX ADMIN — SODIUM CHLORIDE 100 ML/HR: 0.9 INJECTION, SOLUTION INTRAVENOUS at 05:44

## 2022-10-13 RX ADMIN — OXYCODONE HYDROCHLORIDE AND ACETAMINOPHEN 500 MG: 500 TABLET ORAL at 08:32

## 2022-10-13 RX ADMIN — DIPHENHYDRAMINE HYDROCHLORIDE 25 MG: 50 INJECTION, SOLUTION INTRAMUSCULAR; INTRAVENOUS at 03:14

## 2022-10-13 RX ADMIN — CEFTRIAXONE 1000 MG: 1 INJECTION, SOLUTION INTRAVENOUS at 13:13

## 2022-10-13 RX ADMIN — CHOLECALCIFEROL TAB 25 MCG (1000 UNIT) 5000 UNITS: 25 TAB at 08:32

## 2022-10-13 NOTE — CASE MANAGEMENT
Case Management Discharge Planning Note    Patient name Bozena Monte  Location /215-98 MRN [id number]  : 1955 Date 10/13/2022       Current Admission Date: 10/12/2022  Current Admission Diagnosis:Cellulitis of left upper extremity   Patient Active Problem List    Diagnosis Date Noted   • Cellulitis of left upper extremity 10/12/2022   • Dyslipidemia 10/12/2022   • COVID-19 2021   • Nausea 2020   • Constipation 2020   • Generalized abdominal pain 2020   • Glucose intolerance (impaired glucose tolerance) 2017   • Gastroesophageal reflux disease 2017   • Benign paroxysmal vertigo 2016   • Bell's palsy 2016      LOS (days): 0  Geometric Mean LOS (GMLOS) (days):   Days to GMLOS:     OBJECTIVE:            Current admission status: Observation   Preferred Pharmacy:   [facility] [place] - [address] [facility] [address], [address] [place] [address]  Phone: [phone] Fax: [phone]    Primary Care Provider: Renny Kussmaul, DO    Primary Insurance: MEDICARE  Secondary Insurance: Brayan Sine DETAILS:Pt is being dc'd home on this date and will be following up with her PCP as an OP  Pt will call and schedule

## 2022-10-13 NOTE — CASE MANAGEMENT
Case Management Assessment & Discharge Planning Note    Patient name Franklin Grady  Location /676-91 MRN [id number]  : 1955 Date 10/13/2022       Current Admission Date: 10/12/2022  Current Admission Diagnosis:Cellulitis of left upper extremity   Patient Active Problem List    Diagnosis Date Noted   • Cellulitis of left upper extremity 10/12/2022   • Dyslipidemia 10/12/2022   • COVID-19 2021   • Nausea 2020   • Constipation 2020   • Generalized abdominal pain 2020   • Glucose intolerance (impaired glucose tolerance) 2017   • Gastroesophageal reflux disease 2017   • Benign paroxysmal vertigo 2016   • Bell's palsy 2016      LOS (days): 0  Geometric Mean LOS (GMLOS) (days):   Days to GMLOS:     OBJECTIVE:          Current admission status: Observation       Preferred Pharmacy:   [facility] [place] - [address], [address] [facility] [address]  Phone: [phone] Fax: [phone]    Primary Care Provider: Kavin Escalante DO    Primary Insurance: MEDICARE  Secondary Insurance: Jeff Bey    ASSESSMENT:  [address] [facility] Representative - Spouse   Primary Phone: [phone] (Mobile)  Home Phone: [phone]               Advance Directives  Does patient have a [address]?: No  Was patient offered paperwork?: Yes (declines)  Does patient currently have a Health Care decision maker?: Yes, please see Health Care Proxy section  Does patient have Advance Directives?: No  Was patient offered paperwork?: Yes (declines)  Primary Contact: Omer Tompkins (Spouse)  [phone] (M)         Readmission Root Cause  30 Day Readmission: No    Patient Information  Admitted from[de-identified] Home  Mental Status: Alert  During Assessment patient was accompanied by: Not accompanied during assessment  Assessment information provided by[de-identified] Patient  Primary Caregiver: Self  Support Systems: Spouse/significant other, Daughter  [place] of Residence: [address] do you live in?: [address] entry access options   Select all that apply : Stairs  Number of steps to enter home : 4  Type of Current Residence: Other (Comment) (1 story)  Upon entering residence, is there a bedroom on the main floor (no further steps)?: Yes  Upon entering residence, is there a bathroom on the main floor (no further steps)?: Yes  In the last 12 months, was there a time when you were not able to pay the mortgage or rent on time?: No  In the last 12 months, how many places have you lived?: 1  In the last 12 months, was there a time when you did not have a steady place to sleep or slept in a shelter (including now)?: No  Homeless/housing insecurity resource given?: N/A  Living Arrangements: Lives w/ Spouse/significant other  Is patient a ?: No    Activities of Daily Living Prior to Admission  Functional Status: Independent  Completes ADLs independently?: Yes  Ambulates independently?: Yes  Does patient use assisted devices?: No  Does patient currently own DME?: No  Does patient have a history of Outpatient Therapy (PT/OT)?: No  Does the patient have a history of Short-Term Rehab?: No  Does patient have a history of HHC?: No  Does patient currently have [place] [address]?: No         Patient Information Continued  Income Source: Pension/FCI  Does patient have prescription coverage?: Yes  Within the past 12 months, you worried that your food would run out before you got the money to buy more : Never true  Within the past 12 months, the food you bought just didn't last and you didn't have money to get more : Never true  Food insecurity resource given?: N/A  Does patient receive dialysis treatments?: No  Does patient have a history of substance abuse?: No  Does patient have a history of Mental Health Diagnosis?: No         Means of Transportation  Means of Transport to Appts[de-identified] Drives Self  In the past 12 months, has lack of transportation kept you from medical appointments or from getting medications?: No  In the past 12 months, has lack of transportation kept you from meetings, work, or from getting things needed for daily living?: No  Was application for public transport provided?: N/A        DISCHARGE DETAILS:    Discharge planning discussed with[de-identified] patient        CM contacted family/caregiver?: No- see comments (declines)  Were Treatment Team discharge recommendations reviewed with patient/caregiver?: Yes  Did patient/caregiver verbalize understanding of patient care needs?: Yes  Were patient/caregiver advised of the risks associated with not following Treatment Team discharge recommendations?: Yes         [address]         Is the patient interested in [place] [address] at discharge?: No    DME Referral Provided  Referral made for DME?: No         Would you like to participate in our [address] service program?  : No - Declined       Discharge Destination Plan[de-identified] Home  Transport at Discharge : Family      Plans at this time are home on dc with OP follow up  CM will follow and assist in dc planning

## 2022-10-13 NOTE — PLAN OF CARE
Problem: PAIN - ADULT  Goal: Verbalizes/displays adequate comfort level or baseline comfort level  Description: Interventions:  - Encourage patient to monitor pain and request assistance  - Assess pain using appropriate pain scale  - Administer analgesics based on type and severity of pain and evaluate response  - Implement non-pharmacological measures as appropriate and evaluate response  - Consider cultural and social influences on pain and pain management  - Notify physician/advanced practitioner if interventions unsuccessful or patient reports new pain  Outcome: Progressing     Problem: INFECTION - ADULT  Goal: Absence or prevention of progression during hospitalization  Description: INTERVENTIONS:  - Assess and monitor for signs and symptoms of infection  - Monitor lab/diagnostic results  - Monitor all insertion sites, i e  indwelling lines, tubes, and drains  - Monitor endotracheal if appropriate and nasal secretions for changes in amount and color  - [place] appropriate cooling/warming therapies per order  - Administer medications as ordered  - Instruct and encourage patient and family to use good hand hygiene technique  - Identify and instruct in appropriate isolation precautions for identified infection/condition  Outcome: Progressing  Goal: Absence of fever/infection during neutropenic period  Description: INTERVENTIONS:  - Monitor WBC    Outcome: Progressing     Problem: SAFETY ADULT  Goal: Patient will remain free of falls  Description: INTERVENTIONS:  - Educate patient/family on patient safety including physical limitations  - Instruct patient to call for assistance with activity   - Consult OT/PT to assist with strengthening/mobility   - Keep Call bell within reach  - Keep bed low and locked with side rails adjusted as appropriate  - Keep care items and personal belongings within reach  - Initiate and maintain comfort rounds  - Make Fall Risk Sign visible to staff  - Offer Toileting every 2 Hours, in advance of need  - Initiate/Maintain bed alarm  - Obtain necessary fall risk management equipment: bed alarm  - Apply yellow socks and bracelet for high fall risk patients  - Consider moving patient to room near nurses station  Outcome: Progressing  Goal: Maintain or return to baseline ADL function  Description: INTERVENTIONS:  -  Assess patient's ability to carry out ADLs; assess patient's baseline for ADL function and identify physical deficits which impact ability to perform ADLs (bathing, care of mouth/teeth, toileting, grooming, dressing, etc )  - Assess/evaluate cause of self-care deficits   - Assess range of motion  - Assess patient's mobility; develop plan if impaired  - Assess patient's need for assistive devices and provide as appropriate  - Encourage maximum independence but intervene and supervise when necessary  - Involve family in performance of ADLs  - Assess for home care needs following discharge   - Consider OT consult to assist with ADL evaluation and planning for discharge  - Provide patient education as appropriate  Outcome: Progressing  Goal: Maintains/Returns to pre admission functional level  Description: INTERVENTIONS:  - Perform BMAT or MOVE assessment daily    - Set and communicate daily mobility goal to care team and patient/family/caregiver  - Collaborate with rehabilitation services on mobility goals if consulted  - Perform Range of Motion 3 times a day  - Reposition patient every 2 hours    - Dangle patient 3 times a day  - Stand patient 3 times a day  - Ambulate patient 3 times a day  - Out of bed to chair 3 times a day   - Out of bed for meals 3 times a day  - Out of bed for toileting  - Record patient progress and toleration of activity level   Outcome: Progressing     Problem: DISCHARGE PLANNING  Goal: Discharge to home or other facility with appropriate resources  Description: INTERVENTIONS:  - Identify barriers to discharge w/patient and caregiver  - Arrange for needed discharge resources and transportation as appropriate  - Identify discharge learning needs (meds, wound care, etc )  - Arrange for interpretive services to assist at discharge as needed  - Refer to Case Management Department for coordinating discharge planning if the patient needs post-hospital services based on physician/advanced practitioner order or complex needs related to functional status, cognitive ability, or social support system  Outcome: Progressing     Problem: Knowledge Deficit  Goal: Patient/family/caregiver demonstrates understanding of disease process, treatment plan, medications, and discharge instructions  Description: Complete learning assessment and assess knowledge base    Interventions:  - Provide teaching at level of understanding  - Provide teaching via preferred learning methods  Outcome: Progressing

## 2022-10-13 NOTE — DISCHARGE SUMMARY
[facility] [address] [place]  Discharge- Gavin Brooke 1955, 79 y o  female MRN: [id number]  Unit/Bed#: 410-01 Encounter: [id number]  Primary Care Provider: Lio Morales DO   Date and time admitted to hospital: 10/12/2022  3:19 PM    * Cellulitis of left upper extremity  Assessment & Plan  Rash on bilateral upper extremities extending from site of insect bites (Bee)  Areasrapidly expanding prior to admission, erythematous, warm to touch, with underlying swelling of the extremities - findings are consistent with cellulitis  Patient is afebrile, non tachycardic, and without leukocytosis - no evidence of systemic illness, sirs, or sepsis  Patient was initiated on Ceftriaxone with improvement in erythema, swelling, and pain  Blood cultures are still pending at time of discharge, but clinically the patient has improved and remains without any signs of systemic illness  Will discharge with 6 additional days of cephalexin, and recommend that she sees her primary care provider prior to conclusion of antibiotic therapy to determine whether further treatment is required  She was also advised to present back to the hospital if her symptoms worsened instead of improving  Gastroesophageal reflux disease  Assessment & Plan  Continue PPI therapy  Medical Problems             Resolved Problems  Date Reviewed: 10/13/2022   None               Discharging Physician / Practitioner: Bernadette Ordonez  PCP: Lio Morales DO  Admission Date:   Admission Orders (From admission, onward)     Ordered        10/12/22 1720  Place in Observation  Once                      Discharge Date: 10/13/22    Consultations During Hospital Stay:  · None    Procedures Performed:   · None    Significant Findings / Test Results:   No results found  Incidental Findings:   · None     Test Results Pending at Discharge (will require follow up):    · Blood Cultures     Outpatient Tests Requested:  · None    Complications: None    Reason for Admission: Cellulitis    HPI from original H&P on 10/13/2022:   Sinan White is a 79 y o  female being admitted from [facility] ED with a diagnosis of upper extremity cellulitis      Mrs Shari Sousa has past medical history significant for BPPV, GERD, dyslipidemia, constipation  There is also mention of Bell's palsy and prior infection with COVID-19  The patient reports being bitten by a bee on her left hand and right forearm 2 days ago  She then began to experience redness, pain, and warmth surrounding those 2 sites the day after the initial injury  She contacted her doctor's office but ended up going to the urgent care clinic on the day of admission due to worsening symptoms that included worsening and expanding erythema, especially involving her left arm, with warmth and swelling that quickly spread up her arm  She initially went to the urgent care clinic, then sent to the ED for further evaluation  Workup in the ED was essentially unremarkable, with patient being afebrile, non hypotensive, and non tachycardic and without leukocytosis  Imaging was not performed  However, given the significant areas of erythema she was admitted for initiation of IV antibiotics and close monitoring overnight  Please see above list of diagnoses and related plan for additional information  Condition at Discharge: stable    Discharge Day Visit / Exam:     Vitals: Blood Pressure: 119/76 (10/13/22 0705)  Pulse: 61 (10/13/22 0705)  Temperature: 97 9 °F (36 6 °C) (10/13/22 0705)  Temp Source: Temporal (10/12/22 1535)  Respirations: 18 (10/13/22 0705)  Height: 5' 2" (157 5 cm) (10/12/22 1746)  Weight - Scale: 72 1 kg (158 lb 15 2 oz) (10/12/22 1746)  SpO2: 98 % (10/13/22 0705)    Discussion with Family: Updated  () via phone  Discharge instructions/Information to patient and family:   See after visit summary for information provided to patient and family  Provisions for Follow-Up Care:  See after visit summary for information related to follow-up care and any pertinent home health orders  Disposition:   Home    Planned Readmission: No     Discharge Statement:  I spent 35 minutes discharging the patient  This time was spent on the day of discharge  I had direct contact with the patient on the day of discharge  Greater than 50% of the total time was spent examining patient, answering all patient questions, arranging and discussing plan of care with patient as well as directly providing post-discharge instructions  Additional time then spent on discharge activities  Discharge Medications:  See after visit summary for reconciled discharge medications provided to patient and/or family        **Please Note: This note may have been constructed using a voice recognition system**

## 2022-10-13 NOTE — ASSESSMENT & PLAN NOTE
Rash on bilateral upper extremities extending from site of insect bites (Bee)  Areasrapidly expanding prior to admission, erythematous, warm to touch, with underlying swelling of the extremities - findings are consistent with cellulitis  Patient is afebrile, non tachycardic, and without leukocytosis - no evidence of systemic illness, sirs, or sepsis  Patient was initiated on Ceftriaxone with improvement in erythema, swelling, and pain  Blood cultures are still pending at time of discharge, but clinically the patient has improved and remains without any signs of systemic illness  Will discharge with 6 additional days of cephalexin, and recommend that she sees her primary care provider prior to conclusion of antibiotic therapy to determine whether further treatment is required  She was also advised to present back to the hospital if her symptoms worsened instead of improving

## 2022-10-16 LAB
BACTERIA BLD CULT: NORMAL
BACTERIA BLD CULT: NORMAL

## 2022-10-18 LAB
BACTERIA BLD CULT: NORMAL
BACTERIA BLD CULT: NORMAL

## 2023-02-24 ENCOUNTER — OFFICE VISIT (OUTPATIENT)
Dept: FAMILY MEDICINE CLINIC | Facility: CLINIC | Age: 68
End: 2023-02-24

## 2023-02-24 VITALS
HEIGHT: 62 IN | OXYGEN SATURATION: 97 % | DIASTOLIC BLOOD PRESSURE: 88 MMHG | TEMPERATURE: 96.3 F | BODY MASS INDEX: 30.36 KG/M2 | WEIGHT: 165 LBS | SYSTOLIC BLOOD PRESSURE: 152 MMHG | HEART RATE: 83 BPM

## 2023-02-24 DIAGNOSIS — R10.30 LOWER ABDOMINAL PAIN: Primary | ICD-10-CM

## 2023-02-24 NOTE — PROGRESS NOTES
Assessment/Plan:    Problem List Items Addressed This Visit    None  Visit Diagnoses     Lower abdominal pain    -  Primary           Diagnoses and all orders for this visit:    Lower abdominal pain    Other orders  -     Probiotic Product (PROBIOTIC PO); Take by mouth daily        No problem-specific Assessment & Plan notes found for this encounter  Subjective:      Patient ID: Beltran Bosch is a 79 y o  female  Mrs Nina Lopes here she is having has having discomfort 2 on a scale of 10 almost every day or every day in the lower abdomen it goes from about the level of her umbilicus down to the suprapubic region it is literally every day she does not have any diarrhea she recently had a vaginal ultrasound done for her gynecologist which was negative for any gynecologic malignancy she is not having any fever or chills she did get a new job where she has to stand for several hours out of the day but and she does have occasional episodes of sciatica she is already had back surgery but does not seem to be the kind of pain she experienced when she had her back problems she had a subpar colonoscopy by Dr Bronson Du she was not adequately cleaned out and actually is supposed to go back for another colonoscopy      The following portions of the patient's history were reviewed and updated as appropriate:   She has a past medical history of Bell's palsy, Colon polyp, Hypertension, Kidney calculi, and Spinal stenosis  ,  does not have any pertinent problems on file  ,   has a past surgical history that includes Cholecystectomy; Endometrial ablation; Laminectomy (06/14/2019); and Colonoscopy  ,  family history includes Colon cancer in her maternal uncle; Dementia in her mother; Emphysema in her father; Heart disease in her mother; No Known Problems in her brother, daughter, maternal grandfather, maternal grandmother, maternal uncle, maternal uncle, maternal uncle, maternal uncle, paternal grandfather, paternal grandmother, and son ,   reports that she has never smoked  She has never used smokeless tobacco  She reports that she does not drink alcohol and does not use drugs  ,  is allergic to tramadol, ciprofloxacin, lisinopril, and prednisone     Current Outpatient Medications   Medication Sig Dispense Refill   • acetaminophen (TYLENOL) 325 mg tablet Take 2 tablets (650 mg total) by mouth every 6 (six) hours as needed for mild pain, moderate pain, headaches or fever  0   • Ascorbic Acid (VITAMIN C PO) Take 500 mg by mouth daily       • cholecalciferol (VITAMIN D3) 1,000 units tablet Take 5,000 Units by mouth daily       • pantoprazole (PROTONIX) 40 mg tablet Take 1 tablet (40 mg total) by mouth daily 30 tablet 7   • Probiotic Product (PROBIOTIC PO) Take by mouth daily       No current facility-administered medications for this visit  Review of Systems   Constitutional: Negative for activity change, appetite change, diaphoresis, fatigue and fever  HENT: Negative  Eyes: Negative  Respiratory: Negative for apnea, cough, chest tightness, shortness of breath and wheezing  Cardiovascular: Negative for chest pain, palpitations and leg swelling  Gastrointestinal: Positive for abdominal pain  Negative for abdominal distention, anal bleeding, constipation, diarrhea, nausea and vomiting  Endocrine: Negative for cold intolerance, heat intolerance, polydipsia, polyphagia and polyuria  Genitourinary: Negative for difficulty urinating, dysuria, flank pain, hematuria and urgency  Musculoskeletal: Negative for arthralgias, back pain, gait problem, joint swelling and myalgias  Skin: Negative for color change, rash and wound  Allergic/Immunologic: Negative for environmental allergies, food allergies and immunocompromised state  Neurological: Negative for dizziness, seizures, syncope, speech difficulty, numbness and headaches  Hematological: Negative for adenopathy  Does not bruise/bleed easily     Psychiatric/Behavioral: Negative for agitation, behavioral problems, hallucinations, sleep disturbance and suicidal ideas  Objective:  Vitals:    02/24/23 1336 02/24/23 1342   BP: 158/98 152/88   BP Location: Left arm Left arm   Patient Position: Sitting Sitting   Cuff Size: Standard Large   Pulse: 83    Temp: (!) 96 3 °F (35 7 °C)    TempSrc: Temporal    SpO2: 97%    Weight: 74 8 kg (165 lb)    Height: 5' 2" (1 575 m)      Body mass index is 30 18 kg/m²  Physical Exam  Constitutional:       General: She is not in acute distress  Appearance: She is well-developed  She is not diaphoretic  HENT:      Head: Normocephalic  Right Ear: External ear normal       Left Ear: External ear normal       Nose: Nose normal    Eyes:      General: No scleral icterus  Right eye: No discharge  Left eye: No discharge  Conjunctiva/sclera: Conjunctivae normal       Pupils: Pupils are equal, round, and reactive to light  Neck:      Thyroid: No thyromegaly  Trachea: No tracheal deviation  Cardiovascular:      Rate and Rhythm: Normal rate and regular rhythm  Heart sounds: Normal heart sounds  No murmur heard  No friction rub  No gallop  Pulmonary:      Effort: Pulmonary effort is normal  No respiratory distress  Breath sounds: Normal breath sounds  No wheezing  Abdominal:      General: Bowel sounds are normal       Palpations: Abdomen is soft  There is no mass  Tenderness: There is no abdominal tenderness  There is no guarding  Musculoskeletal:         General: No deformity  Cervical back: Normal range of motion  Lymphadenopathy:      Cervical: No cervical adenopathy  Skin:     General: Skin is warm and dry  Findings: No erythema or rash  Neurological:      Mental Status: She is alert and oriented to person, place, and time  Cranial Nerves: No cranial nerve deficit  Psychiatric:         Thought Content:  Thought content normal

## 2023-02-27 ENCOUNTER — APPOINTMENT (EMERGENCY)
Dept: CT IMAGING | Facility: HOSPITAL | Age: 68
End: 2023-02-27

## 2023-02-27 ENCOUNTER — HOSPITAL ENCOUNTER (EMERGENCY)
Facility: HOSPITAL | Age: 68
Discharge: HOME | End: 2023-02-27
Attending: EMERGENCY MEDICINE

## 2023-02-27 VITALS
HEART RATE: 65 BPM | DIASTOLIC BLOOD PRESSURE: 66 MMHG | RESPIRATION RATE: 16 BRPM | WEIGHT: 164.9 LBS | OXYGEN SATURATION: 97 % | BODY MASS INDEX: 30.35 KG/M2 | TEMPERATURE: 97.8 F | HEIGHT: 62 IN | SYSTOLIC BLOOD PRESSURE: 131 MMHG

## 2023-02-27 DIAGNOSIS — M54.50 LOW BACK PAIN: Primary | ICD-10-CM

## 2023-02-27 DIAGNOSIS — R10.30 LOWER ABDOMINAL PAIN, UNSPECIFIED: ICD-10-CM

## 2023-02-27 LAB
ALBUMIN SERPL BCP-MCNC: 4.2 G/DL (ref 3.5–5)
ALP SERPL-CCNC: 56 U/L (ref 34–104)
ALT SERPL W P-5'-P-CCNC: 19 U/L (ref 7–52)
ANION GAP SERPL CALCULATED.3IONS-SCNC: 9 MMOL/L (ref 4–13)
AST SERPL W P-5'-P-CCNC: 29 U/L (ref 13–39)
BASOPHILS # BLD AUTO: 0.06 THOUSANDS/ÂΜL (ref 0–0.1)
BASOPHILS NFR BLD AUTO: 1 % (ref 0–1)
BILIRUB SERPL-MCNC: 0.52 MG/DL (ref 0.2–1)
BILIRUB UR QL STRIP: NEGATIVE
BUN SERPL-MCNC: 24 MG/DL (ref 5–25)
CALCIUM SERPL-MCNC: 9.7 MG/DL (ref 8.4–10.2)
CHLORIDE SERPL-SCNC: 104 MMOL/L (ref 96–108)
CLARITY UR: CLEAR
CO2 SERPL-SCNC: 25 MMOL/L (ref 21–32)
COLOR UR: NORMAL
CREAT SERPL-MCNC: 0.74 MG/DL (ref 0.6–1.3)
EOSINOPHIL # BLD AUTO: 0.1 THOUSAND/ÂΜL (ref 0–0.61)
EOSINOPHIL NFR BLD AUTO: 2 % (ref 0–6)
ERYTHROCYTE [DISTWIDTH] IN BLOOD BY AUTOMATED COUNT: 11.1 % (ref 11.6–15.1)
GFR SERPL CREATININE-BSD FRML MDRD: 84 ML/MIN/1.73SQ M
GLUCOSE SERPL-MCNC: 90 MG/DL (ref 65–140)
GLUCOSE UR STRIP-MCNC: NEGATIVE MG/DL
HCT VFR BLD AUTO: 45.8 % (ref 34.8–46.1)
HGB BLD-MCNC: 15.6 G/DL (ref 11.5–15.4)
HGB UR QL STRIP.AUTO: NEGATIVE
IMM GRANULOCYTES # BLD AUTO: 0.01 THOUSAND/UL (ref 0–0.2)
IMM GRANULOCYTES NFR BLD AUTO: 0 % (ref 0–2)
KETONES UR STRIP-MCNC: NEGATIVE MG/DL
LEUKOCYTE ESTERASE UR QL STRIP: NEGATIVE
LIPASE SERPL-CCNC: 26 U/L (ref 11–82)
LYMPHOCYTES # BLD AUTO: 1.92 THOUSANDS/ÂΜL (ref 0.6–4.47)
LYMPHOCYTES NFR BLD AUTO: 29 % (ref 14–44)
MCH RBC QN AUTO: 32 PG (ref 26.8–34.3)
MCHC RBC AUTO-ENTMCNC: 34.1 G/DL (ref 31.4–37.4)
MCV RBC AUTO: 94 FL (ref 82–98)
MONOCYTES # BLD AUTO: 0.54 THOUSAND/ÂΜL (ref 0.17–1.22)
MONOCYTES NFR BLD AUTO: 8 % (ref 4–12)
NEUTROPHILS # BLD AUTO: 3.97 THOUSANDS/ÂΜL (ref 1.85–7.62)
NEUTS SEG NFR BLD AUTO: 60 % (ref 43–75)
NITRITE UR QL STRIP: NEGATIVE
NRBC BLD AUTO-RTO: 0 /100 WBCS
PH UR STRIP.AUTO: 6.5 [PH]
PLATELET # BLD AUTO: 252 THOUSANDS/UL (ref 149–390)
PMV BLD AUTO: 10.1 FL (ref 8.9–12.7)
POTASSIUM SERPL-SCNC: 4.7 MMOL/L (ref 3.5–5.3)
PROT SERPL-MCNC: 7.4 G/DL (ref 6.4–8.4)
PROT UR STRIP-MCNC: NEGATIVE MG/DL
RBC # BLD AUTO: 4.88 MILLION/UL (ref 3.81–5.12)
SODIUM SERPL-SCNC: 138 MMOL/L (ref 135–147)
SP GR UR STRIP.AUTO: 1.01 (ref 1–1.03)
UROBILINOGEN UR QL STRIP.AUTO: 0.2 E.U./DL
WBC # BLD AUTO: 6.6 THOUSAND/UL (ref 4.31–10.16)

## 2023-02-27 RX ADMIN — IOHEXOL 100 ML: 350 INJECTION, SOLUTION INTRAVENOUS at 13:27

## 2023-02-27 NOTE — ED PROVIDER NOTES
History  Chief Complaint   Patient presents with   • Back Pain     Lower back pain and bilateral leg numbness for two weeks, hx chronic pain     Patient is a 80-year-old female with past medical history of L4/5 anterolisthesis with experimental device implanted and laminectomy in 2019 down at [facility], sliding hiatal hernia, renal calculi, and cholecystectomy who presents today with 2-week history of increasing pressure in the lower abdominal/pelvic area that feels like a band around her pelvis and increasing cold sensation of bilateral legs  Patient has seen her OB/GYN who ordered a vaginal ultrasound  This ultrasound revealed no GYN pathology to explain her symptoms  Patient went to see her family doctor who ordered a pelvic CT scan for which she is scheduled in 5 days  Patient did not contact her neurosurgeon at [facility] regarding her leg symptoms as she thought the pressure in her pelvic area was related and did not think the source was her back  Patient was at home this morning when she felt increased pressure in the pelvic area while up and walking, was generally not feeling well (unable to quantify if it was pain or other symptoms) and she became dizzy  Patient also has some epigastric discomfort with nausea at times which was increased this morning  Reports the pain as 2-3 out of 10  She had just taken a box up the stairs when symptoms started and she checked her blood pressure which was in the 010Y systolic  She is normally in the 354B systolic at home and checks it at different times  Patient stated she became scared and called her goddaughter to bring her into the emergency room as she was home alone  Patient states she was hoping to get the CT scan she had scheduled on Friday today while she was here  This was ordered to assess for "pancreatitis and diverticulosis "  No lab work was done at that time  Also reports some constipation for which she is on MiraLAX with good effect    Patient denies any vomiting, diarrhea, signs of GI bleeding, or fever  For her leg symptoms patient states she has increased "cold sensation" of bilateral legs  She describes this as numbness  She also states after her spine surgery she was able to feel a device in her back, but she can no longer feel it  She noticed this about 2 months ago  She felt it along the left side of her lumbar incision  She did not report this to her neurosurgeon at [facility]  Patient had no acute injury including fall or trauma to the area, no decreased range of motion, no leg weakness or feeling like they will give out, no bowel or bladder incontinence, no fevers/chills, no history of cancer or IV drug use, or other red flag symptoms  Prior to Admission Medications   Prescriptions Last Dose Informant Patient Reported? Taking?    Ascorbic Acid (VITAMIN C PO)   Yes No   Sig: Take 500 mg by mouth daily     Probiotic Product (PROBIOTIC PO)   Yes No   Sig: Take by mouth daily   acetaminophen (TYLENOL) 325 mg tablet   No No   Sig: Take 2 tablets (650 mg total) by mouth every 6 (six) hours as needed for mild pain, moderate pain, headaches or fever   cholecalciferol (VITAMIN D3) 1,000 units tablet   Yes No   Sig: Take 5,000 Units by mouth daily     pantoprazole (PROTONIX) 40 mg tablet   No No   Sig: Take 1 tablet (40 mg total) by mouth daily      Facility-Administered Medications: None       Past Medical History:   Diagnosis Date   • Bell's palsy    • Colon polyp    • Hypertension    • Kidney calculi    • Spinal stenosis        Past Surgical History:   Procedure Laterality Date   • CHOLECYSTECTOMY     • COLONOSCOPY     • ENDOMETRIAL ABLATION     • LAMINECTOMY  06/14/2019    Lumbar spine - With placement of Trial device nonfusion device       Family History   Problem Relation Age of Onset   • Dementia Mother    • Heart disease Mother    • Emphysema Father    • No Known Problems Daughter    • No Known Problems Maternal Grandmother    • No Known Problems Maternal Grandfather    • No Known Problems Paternal Grandmother    • No Known Problems Paternal Grandfather    • Colon cancer Maternal Uncle    • No Known Problems Maternal Uncle    • No Known Problems Maternal Uncle    • No Known Problems Maternal Uncle    • No Known Problems Maternal Uncle    • No Known Problems Son    • No Known Problems Brother      I have reviewed and agree with the history as documented  E-Cigarette/Vaping   • E-Cigarette Use Never User      E-Cigarette/Vaping Substances     Social History     Tobacco Use   • Smoking status: Never   • Smokeless tobacco: Never   Vaping Use   • Vaping Use: Never used   Substance Use Topics   • Alcohol use: Never     Comment: occasionally   • Drug use: Never       Review of Systems   Constitutional: Negative for activity change, chills, diaphoresis, fatigue, fever and unexpected weight change  HENT: Negative for congestion, sore throat and trouble swallowing  Respiratory: Negative for cough, chest tightness and shortness of breath  Cardiovascular: Negative for chest pain, palpitations and leg swelling  Gastrointestinal: Positive for abdominal pain (Epigastric), constipation (On MiraLAX with good effect) and nausea  Negative for abdominal distention, blood in stool, diarrhea and vomiting  Genitourinary: Positive for pelvic pain (Mild)  Negative for decreased urine volume, difficulty urinating, dysuria, flank pain, frequency, hematuria, urgency, vaginal bleeding and vaginal pain  Stress incontinence, at baseline   Musculoskeletal: Positive for back pain (Lumbar tightness  )  Negative for arthralgias, gait problem, joint swelling, myalgias, neck pain and neck stiffness  Skin: Negative for color change, rash and wound  Neurological: Positive for dizziness and numbness (Bilateral legs)  Negative for seizures, syncope, facial asymmetry, weakness, light-headedness and headaches     All other systems reviewed and are negative  Physical Exam  Physical Exam  Vitals and nursing note reviewed  Constitutional:       General: She is awake  She is not in acute distress  Appearance: Normal appearance  She is not toxic-appearing or diaphoretic  HENT:      Head: Normocephalic and atraumatic  Right Ear: Hearing and external ear normal       Left Ear: Hearing and external ear normal       Nose: Nose normal       Mouth/Throat:      Lips: Pink  Mouth: Mucous membranes are moist    Eyes:      General: Lids are normal  Vision grossly intact  No scleral icterus  Right eye: No discharge  Left eye: No discharge  Cardiovascular:      Rate and Rhythm: Normal rate and regular rhythm  Pulses:           Dorsalis pedis pulses are 2+ on the right side and 2+ on the left side  Posterior tibial pulses are 2+ on the right side and 2+ on the left side  Heart sounds: Normal heart sounds  Pulmonary:      Effort: Pulmonary effort is normal  No respiratory distress  Breath sounds: Normal breath sounds  Abdominal:      General: Abdomen is protuberant  Bowel sounds are normal  There is no distension  Palpations: There is no hepatomegaly, splenomegaly or mass  Tenderness: There is abdominal tenderness in the right lower quadrant, suprapubic area and left lower quadrant  There is no right CVA tenderness, left CVA tenderness, guarding or rebound  Comments: Open cholecystectomy incision present, well-healed  Suprapubic/RLQ/LLQ tenderness to palpation that patient reports more as "pressure" with palpation  Musculoskeletal:         General: No deformity or signs of injury  Cervical back: Normal, full passive range of motion without pain, normal range of motion and neck supple  No spinous process tenderness or muscular tenderness  Thoracic back: Normal       Lumbar back: Tenderness (Generalized lumbar area) present  No edema, signs of trauma or bony tenderness   Normal range of motion  Negative right straight leg raise test and negative left straight leg raise test  No scoliosis  Right lower leg: No swelling, deformity, lacerations, tenderness or bony tenderness  No edema  Left lower leg: No swelling, deformity, lacerations, tenderness or bony tenderness  No edema  Right foot: Normal range of motion  No deformity, bunion, Charcot foot, foot drop or prominent metatarsal heads  Left foot: Normal range of motion  No deformity, bunion, Charcot foot, foot drop or prominent metatarsal heads  Comments: Mild tenderness to palpation of lumbar paraspinal muscles  No bony tenderness over spinous process  Unable to palpate implanted device on either side of lumbar incision  Feet:      Right foot:      Skin integrity: Skin integrity normal       Toenail Condition: Right toenails are normal       Left foot:      Skin integrity: Skin integrity normal       Toenail Condition: Left toenails are normal    Skin:     General: Skin is warm and dry  Capillary Refill: Capillary refill takes less than 2 seconds  Coloration: Skin is not jaundiced, mottled or pale  Findings: No bruising, ecchymosis, erythema or rash  Neurological:      General: No focal deficit present  Mental Status: She is alert and oriented to person, place, and time  Mental status is at baseline  Sensory: Sensation is intact  No sensory deficit  Motor: Motor function is intact  No weakness or abnormal muscle tone  Coordination: Coordination is intact  Coordination normal  Heel to Shin Test normal       Gait: Gait is intact  Gait normal       Deep Tendon Reflexes: Babinski sign absent on the right side  Babinski sign absent on the left side  Reflex Scores:       Patellar reflexes are 2+ on the right side and 2+ on the left side  Comments: Patient arose from litter and ambulated to bathroom without assistance, normal gait       Performed sensation testing to light touch with eyes closed  Patient was able to discern light touch in both lower extremities at multiple sites  There is no deficit in sensation of bilateral lower extremities  5/5 strength against resistance of bilateral lower extremities  Psychiatric:         Attention and Perception: Attention and perception normal          Mood and Affect: Affect normal  Mood is anxious  Speech: Speech normal          Behavior: Behavior normal  Behavior is cooperative  Thought Content:  Thought content normal          Cognition and Memory: Cognition normal          Judgment: Judgment normal          Vital Signs  ED Triage Vitals [02/27/23 1112]   Temperature Pulse Respirations Blood Pressure SpO2   97 8 °F (36 6 °C) 82 15 144/81 98 %      Temp Source Heart Rate Source Patient Position - Orthostatic VS BP Location FiO2 (%)   Tympanic Monitor Lying Left arm --      Pain Score       3           Vitals:    02/27/23 1112 02/27/23 1130 02/27/23 1400 02/27/23 1530   BP: 144/81 148/73 161/70 131/66   Pulse: 82 67 74 65   Patient Position - Orthostatic VS: Lying Lying Lying          Visual Acuity      ED Medications  Medications   iohexol (OMNIPAQUE) 350 MG/ML injection (SINGLE-DOSE) 100 mL (100 mL Intravenous Given 2/27/23 1327)       Diagnostic Studies  Results Reviewed     Procedure Component Value Units Date/Time    Lipase [[id number]]  (Normal) Collected: 02/27/23 1252    Lab Status: Final result Specimen: Blood from Arm, Right Updated: 02/27/23 1320     Lipase 26 u/L     Comprehensive metabolic panel [[id number]] Collected: 02/27/23 1252    Lab Status: Final result Specimen: Blood from Arm, Right Updated: 02/27/23 1320     Sodium 138 mmol/L      Potassium 4 7 mmol/L      Chloride 104 mmol/L      CO2 25 mmol/L      ANION GAP 9 mmol/L      BUN 24 mg/dL      Creatinine 0 74 mg/dL      Glucose 90 mg/dL      Calcium 9 7 mg/dL      AST 29 U/L      ALT 19 U/L      Alkaline Phosphatase 56 U/L      Total Protein 7 4 g/dL Albumin 4 2 g/dL      Total Bilirubin 0 52 mg/dL      eGFR 84 ml/min/1 73sq m     Narrative:      National Kidney Disease Foundation guidelines for Chronic Kidney Disease (CKD):   •  Stage 1 with normal or high GFR (GFR > 90 mL/min/1 73 square meters)  •  Stage 2 Mild CKD (GFR = 60-89 mL/min/1 73 square meters)  •  Stage 3A Moderate CKD (GFR = 45-59 mL/min/1 73 square meters)  •  Stage 3B Moderate CKD (GFR = 30-44 mL/min/1 73 square meters)  •  Stage 4 Severe CKD (GFR = 15-29 mL/min/1 73 square meters)  •  Stage 5 End Stage CKD (GFR <15 mL/min/1 73 square meters)  Note: GFR calculation is accurate only with a steady state creatinine    CBC and differential [[id number]]  (Abnormal) Collected: 02/27/23 1252    Lab Status: Final result Specimen: Blood from Arm, Right Updated: 02/27/23 1301     WBC 6 60 Thousand/uL      RBC 4 88 Million/uL      Hemoglobin 15 6 g/dL      Hematocrit 45 8 %      MCV 94 fL      MCH 32 0 pg      MCHC 34 1 g/dL      RDW 11 1 %      MPV 10 1 fL      Platelets 712 Thousands/uL      nRBC 0 /100 WBCs      Neutrophils Relative 60 %      Immat GRANS % 0 %      Lymphocytes Relative 29 %      Monocytes Relative 8 %      Eosinophils Relative 2 %      Basophils Relative 1 %      Neutrophils Absolute 3 97 Thousands/µL      Immature Grans Absolute 0 01 Thousand/uL      Lymphocytes Absolute 1 92 Thousands/µL      Monocytes Absolute 0 54 Thousand/µL      Eosinophils Absolute 0 10 Thousand/µL      Basophils Absolute 0 06 Thousands/µL     UA w Reflex to Microscopic w Reflex to Culture [[id number]] Collected: 02/27/23 1252    Lab Status: Final result Specimen: Urine, Clean Catch Updated: 02/27/23 1301     Color, UA Light Yellow     Clarity, UA Clear     Specific Gravity, UA 1 010     pH, UA 6 5     Leukocytes, UA Negative     Nitrite, UA Negative     Protein, UA Negative mg/dl      Glucose, UA Negative mg/dl      Ketones, UA Negative mg/dl      Urobilinogen, UA 0 2 E U /dl      Bilirubin, UA Negative Occult Blood, UA Negative                 CT abdomen pelvis with contrast   Final Result by Wilfredo Monge MD (02/27 0676)      No acute inflammatory changes in the abdomen or pelvis  Unchanged posterior fixation at L4-L5; intact hardware  Workstation performed: [id number]                    Procedures  Procedures         ED Course  ED Course as of 02/27/23 1751   Mon Feb 27, 2023   1347 UA w Reflex to Microscopic w Reflex to Culture  Negative for UTI    1347 Lipase: 26   1347 Hemoglobin(!): 15 6  Hemoglobin at baseline over past year  1412 Discussed lab work with patient   at bedside  Answered all questions  Awaiting CT reading by radiology  1546 Reviewed CT results with the patient and her   There is diverticulosis, but no fuad changes or acute abdominal process to explain her abdominal pressure  Medical Decision Making  Patient with history as above presented with lower abdominal pressure and bilateral leg numbness and tingling  History obtained from patient  Patient was nontoxic, stable  Ambulatory  Exam as above  Labs reviewed  See ED course  CT of abdomen and pelvis completed - radiologist noted diverticulosis, but no active inflammatory abdominal process  Lumbar spine L4/5 anterolisthesis appears stable with hardware in place without evidence of fracture  Reviewed external records  Differential diagnosis considered were pancreatitis, UTI, diverticulitis, appendicitis  Overall presentation is consistent with low risk abdominal pressure  Low suspicion for appendicitis, SBO, AAA rupture, or other serious pathology  For the leg tingling, differential diagnosis considered  Overall presentation is consistent with low risk back pain  Low suspicion for cauda equina syndrome, cord compression, epidural abscess, osteomyelitis, pyelonephritis, AAA, dissection, or other serious cause of back pain  Patient declined offers for pain medications stating her pain is mild and is more like a pressure  Consideration was given for admission, but the patient was stable for outpatient management  Discussed CT and lab results with the patient  Pt no longer needs her CT pelvis scheduled for Friday  Recommended follow up with her PCP, Dr Ariel Tillman, and also follow up with [facility] to evaluate if her abdominal pressure which feels like a band around her pelvic area  Also discussed that patient should follow up with her gynecologist to discuss the possibility she needs evaluation for pelvic floor dysfunction (however, pt denies worsening stress incontinence or change or bowel/bladder habits)  Discussion with patient and her  regarding when to return to the ED for evaluation including inability to walk, increasing pain/numbness/tingling, weakness, fall or trauma, new severe pain to abdomen, back, or legs, fever, chills, bowel or bladder incontinence, saddle paresthesia, or any other concerning symptoms  Disposition: Discussed need to follow up diagnostics, including incidental findings  Discharged with instructions to obtain outpatient follow up of patient's symptoms and findings, with strict return precautions if patient develops new or worsening symptoms  Patient and  verbalized understanding and agreement with the plan of care  Low back pain: acute illness or injury  Lower abdominal pain, unspecified: acute illness or injury  Amount and/or Complexity of Data Reviewed  Labs: ordered  Decision-making details documented in ED Course  Radiology: ordered  Risk  Prescription drug management            Disposition  Final diagnoses:   Low back pain   Lower abdominal pain, unspecified     Time reflects when diagnosis was documented in both MDM as applicable and the Disposition within this note     Time User Action Codes Description Comment    2/27/2023  3:46 PM Carlos Silva, April Add [M54 50] Low back pain     2/27/2023  3:47 PM Althea Vallejo, April Add [R10 30] Lower abdominal pain, unspecified       ED Disposition     ED Disposition   Discharge    Condition   Stable    Date/Time   Mon Feb 27, 2023  3:46 PM    Comment   Johnnie Juarez [facility] discharge to home/self care  Follow-up Information     Follow up With Specialties Details Why Contact Info Additional Information    Harmony Trimble DO Family Medicine Go in 3 days For follow up visit [id number] [address]) [address]       Johnson Liu MD  Schedule an appointment as soon as possible for a visit in 1 week For follow up visit [address] [facility] [address]       [facility] Emergency Department Emergency Medicine Go to  If symptoms worsen [place] [id number]  [facility] Emergency Department, [address], [address]          Discharge Medication List as of 2/27/2023  3:51 PM      CONTINUE these medications which have NOT CHANGED    Details   acetaminophen (TYLENOL) 325 mg tablet Take 2 tablets (650 mg total) by mouth every 6 (six) hours as needed for mild pain, moderate pain, headaches or fever, Starting Thu 10/13/2022, No Print      Ascorbic Acid (VITAMIN C PO) Take 500 mg by mouth daily  , Historical Med      cholecalciferol (VITAMIN D3) 1,000 units tablet Take 5,000 Units by mouth daily  , Historical Med      pantoprazole (PROTONIX) 40 mg tablet Take 1 tablet (40 mg total) by mouth daily, Starting Tue 8/30/2022, Normal      Probiotic Product (PROBIOTIC PO) Take by mouth daily, Historical Med             No discharge procedures on file      PDMP Review     None          ED Provider  Electronically Signed by           Karen Bravo PA-C  02/27/23 0876

## 2023-02-27 NOTE — DISCHARGE INSTRUCTIONS
Your CT showed diverticulosis, but no inflammatory cause of your lower abdominal pressure  The hardware in your back was intact  I would recommend you make an appointment with your physician down at [facility], Dr Em Ramos, to follow-up regarding your lumbar spine hardware and increased numbness and tingling of bilateral legs  Follow-up with Dr Christa King regarding your CT scan results and further outpatient testing for your abdominal symptoms  Return to the emergency room if you experience unable to stand or walk, severe pain, or other concerning symptoms

## 2023-02-28 ENCOUNTER — OFFICE VISIT (OUTPATIENT)
Dept: FAMILY MEDICINE CLINIC | Facility: CLINIC | Age: 68
End: 2023-02-28

## 2023-02-28 VITALS
DIASTOLIC BLOOD PRESSURE: 88 MMHG | HEART RATE: 76 BPM | TEMPERATURE: 96.8 F | HEIGHT: 62 IN | OXYGEN SATURATION: 98 % | WEIGHT: 164 LBS | SYSTOLIC BLOOD PRESSURE: 142 MMHG | BODY MASS INDEX: 30.18 KG/M2

## 2023-02-28 DIAGNOSIS — M54.16 LUMBAR RADICULOPATHY, CHRONIC: Primary | ICD-10-CM

## 2023-02-28 NOTE — PROGRESS NOTES
BMI Counseling: Body mass index is 30 kg/m²  The BMI is above normal  Nutrition recommendations include decreasing portion sizes, encouraging healthy choices of fruits and vegetables, moderation in carbohydrate intake and increasing intake of lean protein  Rationale for BMI follow-up plan is due to patient being overweight or obese  Assessment/Plan: She will contact her neurosurgeon at [facility] for a visit and we will get their opinion regarding the radiculopathy    Problem List Items Addressed This Visit    None  Visit Diagnoses     Lumbar radiculopathy, chronic    -  Primary           Diagnoses and all orders for this visit:    Lumbar radiculopathy, chronic        No problem-specific Assessment & Plan notes found for this encounter  Subjective:      Patient ID: Charan Pearce is a 79 y o  female  Mrs Natalya Winters is here she is still experiencing abdominal cramping but she also has a sensation of coldness going down her legs posteriorly into both legs patient then done in the emergency room was normal no sign of diverticulitis no sign of tumor patient has no displacement of her appliance in her lumbar spine      The following portions of the patient's history were reviewed and updated as appropriate:   She has a past medical history of Bell's palsy, Colon polyp, Hypertension, Kidney calculi, and Spinal stenosis  ,  does not have any pertinent problems on file  ,   has a past surgical history that includes Cholecystectomy; Endometrial ablation; Laminectomy (06/14/2019); and Colonoscopy  ,  family history includes Colon cancer in her maternal uncle; Dementia in her mother; Emphysema in her father; Heart disease in her mother; No Known Problems in her brother, daughter, maternal grandfather, maternal grandmother, maternal uncle, maternal uncle, maternal uncle, maternal uncle, paternal grandfather, paternal grandmother, and son ,   reports that she has never smoked   She has never used smokeless tobacco  She reports that she does not drink alcohol and does not use drugs  ,  is allergic to tramadol, ciprofloxacin, lisinopril, and prednisone     Current Outpatient Medications   Medication Sig Dispense Refill   • acetaminophen (TYLENOL) 325 mg tablet Take 2 tablets (650 mg total) by mouth every 6 (six) hours as needed for mild pain, moderate pain, headaches or fever  0   • Ascorbic Acid (VITAMIN C PO) Take 500 mg by mouth daily       • cholecalciferol (VITAMIN D3) 1,000 units tablet Take 5,000 Units by mouth daily       • pantoprazole (PROTONIX) 40 mg tablet Take 1 tablet (40 mg total) by mouth daily 30 tablet 7   • Probiotic Product (PROBIOTIC PO) Take by mouth daily       No current facility-administered medications for this visit  Review of Systems   Constitutional: Negative for activity change, appetite change, diaphoresis, fatigue and fever  HENT: Negative  Eyes: Negative  Respiratory: Negative for apnea, cough, chest tightness, shortness of breath and wheezing  Cardiovascular: Negative for chest pain, palpitations and leg swelling  Gastrointestinal: Negative for abdominal distention, abdominal pain, anal bleeding, constipation, diarrhea, nausea and vomiting  Endocrine: Negative for cold intolerance, heat intolerance, polydipsia, polyphagia and polyuria  Genitourinary: Negative for difficulty urinating, dysuria, flank pain, hematuria and urgency  Musculoskeletal: Negative for arthralgias, back pain, gait problem, joint swelling and myalgias  Skin: Negative for color change, rash and wound  Allergic/Immunologic: Negative for environmental allergies, food allergies and immunocompromised state  Neurological: Negative for dizziness, seizures, syncope, speech difficulty, numbness and headaches  Patient has numbness and a cold sensation going down the back of both of her legs and she has nominal discomfort going across her lower abdomen   Hematological: Negative for adenopathy   Does not bruise/bleed easily  Psychiatric/Behavioral: Negative for agitation, behavioral problems, hallucinations, sleep disturbance and suicidal ideas  Objective:  Vitals:    02/28/23 1407   BP: 142/88   BP Location: Left arm   Patient Position: Sitting   Cuff Size: Standard   Pulse: 76   Temp: (!) 96 8 °F (36 °C)   TempSrc: Temporal   SpO2: 98%   Weight: 74 4 kg (164 lb)   Height: 5' 2" (1 575 m)     Body mass index is 30 kg/m²  Physical Exam  Constitutional:       General: She is not in acute distress  Appearance: She is well-developed  She is not diaphoretic  HENT:      Head: Normocephalic  Right Ear: External ear normal       Left Ear: External ear normal       Nose: Nose normal    Eyes:      General: No scleral icterus  Right eye: No discharge  Left eye: No discharge  Conjunctiva/sclera: Conjunctivae normal       Pupils: Pupils are equal, round, and reactive to light  Neck:      Thyroid: No thyromegaly  Trachea: No tracheal deviation  Cardiovascular:      Rate and Rhythm: Normal rate and regular rhythm  Heart sounds: Normal heart sounds  No murmur heard  No friction rub  No gallop  Pulmonary:      Effort: Pulmonary effort is normal  No respiratory distress  Breath sounds: Normal breath sounds  No wheezing  Abdominal:      General: Bowel sounds are normal       Palpations: Abdomen is soft  There is no mass  Tenderness: There is no abdominal tenderness  There is no guarding  Musculoskeletal:         General: No deformity  Cervical back: Normal range of motion  Lymphadenopathy:      Cervical: No cervical adenopathy  Skin:     General: Skin is warm and dry  Findings: No erythema or rash  Neurological:      Mental Status: She is alert and oriented to person, place, and time  Cranial Nerves: No cranial nerve deficit  Psychiatric:         Thought Content:  Thought content normal

## 2023-05-18 ENCOUNTER — TELEPHONE (OUTPATIENT)
Dept: GASTROENTEROLOGY | Facility: CLINIC | Age: 68
End: 2023-05-18

## 2023-05-18 DIAGNOSIS — K21.9 GASTROESOPHAGEAL REFLUX DISEASE WITHOUT ESOPHAGITIS: ICD-10-CM

## 2023-05-18 RX ORDER — PANTOPRAZOLE SODIUM 40 MG/1
40 TABLET, DELAYED RELEASE ORAL DAILY
Qty: 90 TABLET | Refills: 0 | Status: SHIPPED | OUTPATIENT
Start: 2023-05-18 | End: 2023-08-16

## 2023-05-18 NOTE — TELEPHONE ENCOUNTER
Last OV 8/30/22 Ross  FU needs to be scheduled  H/O  1  Gastroesophageal reflux disease without esophagitis  2  Left upper quadrant abdominal pain  3  Constipation, unspecified constipation type  4   Generalized abdominal pain    Medications  - pantoprazole (PROTONIX) 40 mg daily

## 2023-05-18 NOTE — TELEPHONE ENCOUNTER
Patients GI provider:  Dr Brent Rodriguez    Number to return call: [phone]    Reason for call: Pt calling to reschedule cancelled colonoscopy, but is having symptoms  She has constipation, abdominal, back , and leg pain  No appointments available until July   She would like to be seen sooner    Scheduled procedure/appointment date if applicable: N/A

## 2023-05-18 NOTE — TELEPHONE ENCOUNTER
"I spoke with the patient  Reports overall feels unwell due to nausea and generalized back pain  Daily bowel movements, strainig occasionally  Taking 1/2 capful Miralax daily  Nausea on and off  Pt feels \"something dropped in rectum a few months ago\"    Recommended  Charmco diet if experiencing nausea  Miralax 1/2 to 1 capful daily for goal of daily bowel movement without straining     Schedule EGD/Colonoscopy  Await call from  for appointment availability      Pt requesting 3 month supply of Pantoprazole  "

## 2023-05-21 ENCOUNTER — APPOINTMENT (EMERGENCY)
Dept: CT IMAGING | Facility: HOSPITAL | Age: 68
End: 2023-05-21

## 2023-05-21 ENCOUNTER — HOSPITAL ENCOUNTER (EMERGENCY)
Facility: HOSPITAL | Age: 68
Discharge: HOME | End: 2023-05-21
Attending: FAMILY MEDICINE

## 2023-05-21 VITALS
SYSTOLIC BLOOD PRESSURE: 142 MMHG | OXYGEN SATURATION: 96 % | DIASTOLIC BLOOD PRESSURE: 80 MMHG | RESPIRATION RATE: 16 BRPM | BODY MASS INDEX: 29.26 KG/M2 | WEIGHT: 160 LBS | HEART RATE: 65 BPM | TEMPERATURE: 97.3 F

## 2023-05-21 DIAGNOSIS — R10.9 ABDOMINAL PAIN: ICD-10-CM

## 2023-05-21 DIAGNOSIS — K59.00 CONSTIPATION: ICD-10-CM

## 2023-05-21 DIAGNOSIS — N28.1 RENAL CYST: ICD-10-CM

## 2023-05-21 DIAGNOSIS — R11.0 NAUSEA: ICD-10-CM

## 2023-05-21 DIAGNOSIS — M54.50 LOW BACK PAIN: Primary | ICD-10-CM

## 2023-05-21 LAB
ALBUMIN SERPL BCP-MCNC: 4.2 G/DL (ref 3.5–5)
ALP SERPL-CCNC: 65 U/L (ref 34–104)
ALT SERPL W P-5'-P-CCNC: 17 U/L (ref 7–52)
ANION GAP SERPL CALCULATED.3IONS-SCNC: 8 MMOL/L (ref 4–13)
AST SERPL W P-5'-P-CCNC: 19 U/L (ref 13–39)
BASOPHILS # BLD AUTO: 0.07 THOUSANDS/ÂΜL (ref 0–0.1)
BASOPHILS NFR BLD AUTO: 1 % (ref 0–1)
BILIRUB SERPL-MCNC: 0.55 MG/DL (ref 0.2–1)
BILIRUB UR QL STRIP: NEGATIVE
BUN SERPL-MCNC: 23 MG/DL (ref 5–25)
CALCIUM SERPL-MCNC: 9.9 MG/DL (ref 8.4–10.2)
CHLORIDE SERPL-SCNC: 104 MMOL/L (ref 96–108)
CLARITY UR: CLEAR
CO2 SERPL-SCNC: 26 MMOL/L (ref 21–32)
COLOR UR: NORMAL
CREAT SERPL-MCNC: 0.75 MG/DL (ref 0.6–1.3)
EOSINOPHIL # BLD AUTO: 0.24 THOUSAND/ÂΜL (ref 0–0.61)
EOSINOPHIL NFR BLD AUTO: 4 % (ref 0–6)
ERYTHROCYTE [DISTWIDTH] IN BLOOD BY AUTOMATED COUNT: 11.1 % (ref 11.6–15.1)
GFR SERPL CREATININE-BSD FRML MDRD: 82 ML/MIN/1.73SQ M
GLUCOSE SERPL-MCNC: 109 MG/DL (ref 65–140)
GLUCOSE UR STRIP-MCNC: NEGATIVE MG/DL
HCT VFR BLD AUTO: 45.5 % (ref 34.8–46.1)
HGB BLD-MCNC: 15.2 G/DL (ref 11.5–15.4)
HGB UR QL STRIP.AUTO: NEGATIVE
IMM GRANULOCYTES # BLD AUTO: 0.01 THOUSAND/UL (ref 0–0.2)
IMM GRANULOCYTES NFR BLD AUTO: 0 % (ref 0–2)
KETONES UR STRIP-MCNC: NEGATIVE MG/DL
LEUKOCYTE ESTERASE UR QL STRIP: NEGATIVE
LIPASE SERPL-CCNC: 18 U/L (ref 11–82)
LYMPHOCYTES # BLD AUTO: 2.01 THOUSANDS/ÂΜL (ref 0.6–4.47)
LYMPHOCYTES NFR BLD AUTO: 30 % (ref 14–44)
MAGNESIUM SERPL-MCNC: 1.9 MG/DL (ref 1.9–2.7)
MCH RBC QN AUTO: 31.5 PG (ref 26.8–34.3)
MCHC RBC AUTO-ENTMCNC: 33.4 G/DL (ref 31.4–37.4)
MCV RBC AUTO: 94 FL (ref 82–98)
MONOCYTES # BLD AUTO: 0.6 THOUSAND/ÂΜL (ref 0.17–1.22)
MONOCYTES NFR BLD AUTO: 9 % (ref 4–12)
NEUTROPHILS # BLD AUTO: 3.73 THOUSANDS/ÂΜL (ref 1.85–7.62)
NEUTS SEG NFR BLD AUTO: 56 % (ref 43–75)
NITRITE UR QL STRIP: NEGATIVE
NRBC BLD AUTO-RTO: 0 /100 WBCS
PH UR STRIP.AUTO: 6.5 [PH]
PLATELET # BLD AUTO: 272 THOUSANDS/UL (ref 149–390)
PMV BLD AUTO: 9.8 FL (ref 8.9–12.7)
POTASSIUM SERPL-SCNC: 4.2 MMOL/L (ref 3.5–5.3)
PROT SERPL-MCNC: 7.3 G/DL (ref 6.4–8.4)
PROT UR STRIP-MCNC: NEGATIVE MG/DL
RBC # BLD AUTO: 4.82 MILLION/UL (ref 3.81–5.12)
SODIUM SERPL-SCNC: 138 MMOL/L (ref 135–147)
SP GR UR STRIP.AUTO: 1.01
UROBILINOGEN UR QL STRIP.AUTO: 0.2 E.U./DL
WBC # BLD AUTO: 6.66 THOUSAND/UL (ref 4.31–10.16)

## 2023-05-21 RX ORDER — FAMOTIDINE 10 MG/ML
20 INJECTION, SOLUTION INTRAVENOUS DAILY
Status: DISCONTINUED | OUTPATIENT
Start: 2023-05-21 | End: 2023-05-21 | Stop reason: HOSPADM

## 2023-05-21 RX ORDER — ONDANSETRON 2 MG/ML
4 INJECTION INTRAMUSCULAR; INTRAVENOUS ONCE
Status: COMPLETED | OUTPATIENT
Start: 2023-05-21 | End: 2023-05-21

## 2023-05-21 RX ORDER — KETOROLAC TROMETHAMINE 30 MG/ML
15 INJECTION, SOLUTION INTRAMUSCULAR; INTRAVENOUS ONCE
Status: COMPLETED | OUTPATIENT
Start: 2023-05-21 | End: 2023-05-21

## 2023-05-21 RX ORDER — ONDANSETRON 4 MG/1
4 TABLET, ORALLY DISINTEGRATING ORAL EVERY 6 HOURS PRN
Qty: 20 TABLET | Refills: 0 | Status: SHIPPED | OUTPATIENT
Start: 2023-05-21

## 2023-05-21 RX ADMIN — FAMOTIDINE 20 MG: 10 INJECTION, SOLUTION INTRAVENOUS at 08:57

## 2023-05-21 RX ADMIN — SODIUM CHLORIDE 1000 ML: 0.9 INJECTION, SOLUTION INTRAVENOUS at 08:47

## 2023-05-21 RX ADMIN — KETOROLAC TROMETHAMINE 15 MG: 30 INJECTION, SOLUTION INTRAMUSCULAR; INTRAVENOUS at 08:57

## 2023-05-21 RX ADMIN — IOHEXOL 100 ML: 350 INJECTION, SOLUTION INTRAVENOUS at 09:35

## 2023-05-21 RX ADMIN — ONDANSETRON 4 MG: 2 INJECTION INTRAMUSCULAR; INTRAVENOUS at 08:56

## 2023-05-21 NOTE — ED PROVIDER NOTES
"History  Chief Complaint   Patient presents with   • Back Pain     Radiating to groin and down left leg  Patient reporting a lump on left hip as well  • Difficulty Urinating     49-year-old female with past medical history of hypertension,  L4/5 anterolisthesis with experimental device implanted and laminectomy in 2019, hiatal hernia arriving for evaluation of 2 weeks of left sided back pain  Patient states pain is different than her usual back pain  Patient states she feels as if her left hip is bigger than her right hip  Patient standing without grimace  Patient witnessed walking without gait deficit  Straight leg raise does no illicit pain from left or right leg  Patient states pain is constant but has been worsening  Patient states pain radiates to her left groin, left lower abdomen, and down her left upper leg from the hip across to knee  Patient states she is also constipated with last full BM approximately one week ago  Patient states has been taking miralax  Patient reports nausea but no vomiting  Patient denies blood in urine or stool  Patient reports cannot be seen by GI until July, and attempted to move her appointment this past week without success  Patient reports that when she urinates she feels like she has to push on her lower abdomen to completely void, which has been ongoing for three days  Patient reports lower abdominal discomfort with urination  Patient denies fever or chills  Patient denies IV drug abuse  Patient reports told she has diverticulosis  Patient tender on palpation in left lower quadrant  Patient denies alcohol abuse  Patient reports has not taken any pain medication, or tried non-pharmacologic interventions over that past two weeks for her back pain  Patient denies urinary/fecal retention/incontinence  Patient denies numbness or tingling into her foot  Patient denies saddle anesthesia, or perineal paraesthesias   Patient also stating she feels \"dehydrated,\" and when she was " "evaluated for similar told \"slightly dehydrated  \"            Prior to Admission Medications   Prescriptions Last Dose Informant Patient Reported? Taking? Ascorbic Acid (VITAMIN C PO)   Yes No   Sig: Take 500 mg by mouth daily     Probiotic Product (PROBIOTIC PO)   Yes No   Sig: Take by mouth daily   acetaminophen (TYLENOL) 325 mg tablet   No No   Sig: Take 2 tablets (650 mg total) by mouth every 6 (six) hours as needed for mild pain, moderate pain, headaches or fever   cholecalciferol (VITAMIN D3) 1,000 units tablet   Yes No   Sig: Take 5,000 Units by mouth daily     pantoprazole (PROTONIX) 40 mg tablet   No No   Sig: Take 1 tablet (40 mg total) by mouth daily      Facility-Administered Medications: None       Past Medical History:   Diagnosis Date   • Bell's palsy    • Colon polyp    • Hypertension    • Kidney calculi    • Spinal stenosis        Past Surgical History:   Procedure Laterality Date   • CHOLECYSTECTOMY     • COLONOSCOPY     • ENDOMETRIAL ABLATION     • LAMINECTOMY  06/14/2019    Lumbar spine - With placement of Trial device nonfusion device       Family History   Problem Relation Age of Onset   • Dementia Mother    • Heart disease Mother    • Emphysema Father    • No Known Problems Daughter    • No Known Problems Maternal Grandmother    • No Known Problems Maternal Grandfather    • No Known Problems Paternal Grandmother    • No Known Problems Paternal Grandfather    • Colon cancer Maternal Uncle    • No Known Problems Maternal Uncle    • No Known Problems Maternal Uncle    • No Known Problems Maternal Uncle    • No Known Problems Maternal Uncle    • No Known Problems Son    • No Known Problems Brother      I have reviewed and agree with the history as documented      E-Cigarette/Vaping   • E-Cigarette Use Never User      E-Cigarette/Vaping Substances     Social History     Tobacco Use   • Smoking status: Never   • Smokeless tobacco: Never   Vaping Use   • Vaping Use: Never used   Substance Use Topics " • Alcohol use: Never     Comment: occasionally   • Drug use: Never       Review of Systems   Constitutional: Negative  HENT: Negative  Eyes: Negative  Respiratory: Negative  Negative for shortness of breath  Cardiovascular: Negative  Negative for chest pain  Gastrointestinal: Positive for abdominal pain, constipation and nausea  Negative for abdominal distention, anal bleeding, blood in stool, diarrhea, rectal pain and vomiting  Endocrine: Negative  Genitourinary: Negative  Musculoskeletal: Positive for back pain  Negative for arthralgias, gait problem, joint swelling, myalgias, neck pain and neck stiffness  Skin: Negative  Allergic/Immunologic: Negative  Neurological: Negative  Hematological: Negative  Psychiatric/Behavioral: Negative  All other systems reviewed and are negative  Physical Exam  Physical Exam  Vitals and nursing note reviewed  Constitutional:       General: She is not in acute distress  Appearance: Normal appearance  She is normal weight  She is not ill-appearing or toxic-appearing  HENT:      Head: Normocephalic  Right Ear: External ear normal       Left Ear: External ear normal       Nose: Nose normal       Mouth/Throat:      Mouth: Mucous membranes are moist    Eyes:      General:         Right eye: No discharge  Left eye: No discharge  Extraocular Movements: Extraocular movements intact  Conjunctiva/sclera: Conjunctivae normal       Pupils: Pupils are equal, round, and reactive to light  Cardiovascular:      Rate and Rhythm: Normal rate and regular rhythm  Pulses: Normal pulses  Heart sounds: Normal heart sounds  No murmur heard  Pulmonary:      Effort: Pulmonary effort is normal  No respiratory distress  Breath sounds: Normal breath sounds  No stridor  No wheezing, rhonchi or rales  Chest:      Chest wall: No tenderness  Abdominal:      General: Abdomen is flat   Bowel sounds are normal  There is no distension  Palpations: There is no mass  Tenderness: There is abdominal tenderness in the left lower quadrant  There is no right CVA tenderness, left CVA tenderness, guarding or rebound  Negative signs include Rovsing's sign  Hernia: No hernia is present  There is no hernia in the umbilical area, ventral area, left inguinal area, right femoral area, left femoral area or right inguinal area  Musculoskeletal:      Cervical back: Normal, normal range of motion and neck supple  Thoracic back: Normal       Lumbar back: Normal  Negative right straight leg raise test and negative left straight leg raise test         Back:       Comments: No midline tenderness     Skin:     General: Skin is warm  Capillary Refill: Capillary refill takes less than 2 seconds  Neurological:      General: No focal deficit present  Mental Status: She is alert and oriented to person, place, and time  Mental status is at baseline     Psychiatric:         Mood and Affect: Mood normal          Behavior: Behavior normal          Vital Signs  ED Triage Vitals   Temperature Pulse Respirations Blood Pressure SpO2   05/21/23 0804 05/21/23 0804 05/21/23 0804 05/21/23 0807 05/21/23 0804   (!) 97 3 °F (36 3 °C) 81 16 161/76 98 %      Temp Source Heart Rate Source Patient Position - Orthostatic VS BP Location FiO2 (%)   05/21/23 0804 05/21/23 0804 05/21/23 0804 05/21/23 0804 --   Tympanic Monitor Sitting Left arm       Pain Score       05/21/23 0804       6           Vitals:    05/21/23 0804 05/21/23 0807 05/21/23 1128   BP:  161/76 142/80   Pulse: 81  65   Patient Position - Orthostatic VS: Sitting  Sitting         Visual Acuity      ED Medications  Medications   Famotidine (PF) (PEPCID) injection 20 mg (20 mg Intravenous Given 5/21/23 0857)   ondansetron (ZOFRAN) injection 4 mg (4 mg Intravenous Given 5/21/23 0856)   ketorolac (TORADOL) injection 15 mg (15 mg Intravenous Given 5/21/23 0857)   sodium chloride 0 9 % bolus 1,000 mL (0 mL Intravenous Stopped 5/21/23 1107)   iohexol (OMNIPAQUE) 350 MG/ML injection (SINGLE-DOSE) 100 mL (100 mL Intravenous Given 5/21/23 0935)       Diagnostic Studies  Results Reviewed     Procedure Component Value Units Date/Time    UA w Reflex to Microscopic w Reflex to Culture [[id number]]  (Normal) Collected: 05/21/23 1021    Lab Status: Final result Specimen: Urine, Clean Catch Updated: 05/21/23 1054     Color, UA Straw     Clarity, UA Clear     Specific Gravity, UA 1 010     pH, UA 6 5     Leukocytes, UA Negative     Nitrite, UA Negative     Protein, UA Negative mg/dl      Glucose, UA Negative mg/dl      Ketones, UA Negative mg/dl      Urobilinogen, UA 0 2 E U /dl      Bilirubin, UA Negative     Occult Blood, UA Negative    CBC and differential [[id number]]  (Abnormal) Collected: 05/21/23 0843    Lab Status: Final result Specimen: Blood from Arm, Left Updated: 05/21/23 0932     WBC 6 66 Thousand/uL      RBC 4 82 Million/uL      Hemoglobin 15 2 g/dL      Hematocrit 45 5 %      MCV 94 fL      MCH 31 5 pg      MCHC 33 4 g/dL      RDW 11 1 %      MPV 9 8 fL      Platelets 888 Thousands/uL      nRBC 0 /100 WBCs      Neutrophils Relative 56 %      Immat GRANS % 0 %      Lymphocytes Relative 30 %      Monocytes Relative 9 %      Eosinophils Relative 4 %      Basophils Relative 1 %      Neutrophils Absolute 3 73 Thousands/µL      Immature Grans Absolute 0 01 Thousand/uL      Lymphocytes Absolute 2 01 Thousands/µL      Monocytes Absolute 0 60 Thousand/µL      Eosinophils Absolute 0 24 Thousand/µL      Basophils Absolute 0 07 Thousands/µL     CMP [[id number]] Collected: 05/21/23 0843    Lab Status: Final result Specimen: Blood from Arm, Left Updated: 05/21/23 0919     Sodium 138 mmol/L      Potassium 4 2 mmol/L      Chloride 104 mmol/L      CO2 26 mmol/L      ANION GAP 8 mmol/L      BUN 23 mg/dL      Creatinine 0 75 mg/dL      Glucose 109 mg/dL      Calcium 9 9 mg/dL      AST 19 U/L      ALT 17 U/L Alkaline Phosphatase 65 U/L      Total Protein 7 3 g/dL      Albumin 4 2 g/dL      Total Bilirubin 0 55 mg/dL      eGFR 82 ml/min/1 73sq m     Narrative:      [place] guidelines for Chronic Kidney Disease (CKD):   •  Stage 1 with normal or high GFR (GFR > 90 mL/min/1 73 square meters)  •  Stage 2 Mild CKD (GFR = 60-89 mL/min/1 73 square meters)  •  Stage 3A Moderate CKD (GFR = 45-59 mL/min/1 73 square meters)  •  Stage 3B Moderate CKD (GFR = 30-44 mL/min/1 73 square meters)  •  Stage 4 Severe CKD (GFR = 15-29 mL/min/1 73 square meters)  •  Stage 5 End Stage CKD (GFR <15 mL/min/1 73 square meters)  Note: GFR calculation is accurate only with a steady state creatinine    Lipase [[id number]]  (Normal) Collected: 05/21/23 0843    Lab Status: Final result Specimen: Blood from Arm, Left Updated: 05/21/23 0919     Lipase 18 u/L     Magnesium [[id number]]  (Normal) Collected: 05/21/23 0843    Lab Status: Final result Specimen: Blood from Arm, Left Updated: 05/21/23 0919     Magnesium 1 9 mg/dL                  CT recon only lumbar spine   Final Result by Gustavo 6,  (05/21 1011)      No fracture or traumatic subluxation  Status post decompressive laminectomy and fusion at L4-5 with pedicle screw fixation  Stable 6 mm anterolisthesis when compared to the prior MRI  Workstation performed: [id number]         CT Abdomen pelvis with contrast   Final Result by Aline Sow MD (05/21 1022)   No bowel obstruction or acute abdominopelvic pathology  Workstation performed: [id number]                    Procedures  Procedures         ED Course  ED Course as of 05/21/23 1311   Sun May 21, 2023   1709 Reassessed patient, reports pain is a 6/10  Offered additional pain medication, patient declines  1041 Discussed CT recon lumbar with Dr Maik Aaron regarding noted degenerative changes, states present on prior CT a/p     1042 PVR per RN staff is 35 mL     1059 UA w Reflex to Microscopic w Reflex to Culture  No sign of uti  Medical Decision Making  DDx: electrolyte disturbance, diverticulitis, LBO, SBO, UTI  Patient arriving, non-toxic appearing, witnessed ambulating into department with strong, steady gait  Patient arriving for left lower back pain, left groin, and left lower quadrant abdominal pain with nausea  Pain has been ongoing for two weeks  Nausea started one week ago  Patient attempting to follow with GI for constipation, and attempted to move appointment this week  Patient states no full BM in one week  Patient states taking miralax and protonix without relief to constipation or nausea  Patient states does not have PRN nausea medication at home  Patient has left lower back pain different than her usual back pain  Pain is reproducible with palpation  Patient has negative straight leg raise test bilaterally  Patient has no red flag symptoms  Patient has no fever, saddle anesthesia, perineal paraesthesia, urinary/fecal incontinence or retention  Patient denies RUQ pain, epigastric pain  Patient denies chest pain or shortness of breath  Review of records shows patient was evaluated for similar in February  Will check abdominal pain work up, check CT a/p due to continued constipation, and LLQ pain on palpation  Will provide fluids, analgesia, antiemetics  No leukocytosis, no anemia  No PADMA  No electrolyte abnormality  LFT WNL  Lipase WNL, pancreatitis ruled out  Mg WNL  PVR of 35 mL  No sign of UTI on UA  CT findings appreciated no acute findings  Degenerative changes noted on lumbar recons but were present prior to today  Patient reports nausea improved after pepcid and zofran  Re-evaluated patient after pain medication and reports improvement, declines further medications  Discussed at home regimen for tylenol Q6H 650 mg  Discussed can take ibuprofen sparingly not to exceed more than three days of consistent use  Discussed can cause dyspepsia as well  Zofran prescription provided for at home  Referral to GI placed with phone number provided  Referral to [facility] and orthopedics placed with phone number provided  Patient has follow up in June/July with [facility] for spine implant  Discussed strict return precautions with patient and   Reviewed all results  At time of discharge patient reports her pain is greatly improved to a 0/10  Vital signs stable at discharge  Patient states does not like taking pills  Provided parent and child  Discussed with patient that if she uses gel to not utilized NSAID PO dose  Reviewed reasons to return to ed  Patient verbalized understanding of diagnosis and agreement with discharge plan of care as well as understanding of reasons to return to ed  Incidental finding of renal cyst was appreciated again on today's CT  This was discussed with patient  Nephrology follow provided with referral        Amount and/or Complexity of Data Reviewed  Labs: ordered  Decision-making details documented in ED Course  Radiology: ordered  Risk  Prescription drug management  Disposition  Final diagnoses:   Low back pain   Abdominal pain   Constipation   Nausea   Renal cyst     Time reflects when diagnosis was documented in both MDM as applicable and the Disposition within this note     Time User Action Codes Description Comment    5/21/2023 11:07 AM Rexine Lav Add [M54 50] Low back pain     5/21/2023 11:07 AM Rexine Lav Add [R10 9] Abdominal pain     5/21/2023 11:07 AM Rexine Lav Add [K59 00] Constipation     5/21/2023 11:07 AM Rexine Lav Add [R11 0] Nausea     5/21/2023 11:23 AM Rexine Lav Add [N28 1] Renal cyst       ED Disposition     ED Disposition   Discharge    Condition   Stable    Date/Time   Sun May 21, 2023 11:07 AM    Comment   Susu King [facility] discharge to home/self care                 Follow-up Information     Follow up With Specialties Details Why Contact Info Additional Information    [facility] [address] [facility] Schedule an appointment as soon as possible for a visit in 1 day For further evaluation of symptoms [address] [id number] [address] [facility] [place], [address] [facility], [address] [place], [place], [address], [address] [facility] Emergency Department Emergency Medicine Go to  If symptoms worsen [address] [place] [address]  [facility] Emergency Department, [address] [place], [place], [address] [facility],  Family Medicine Schedule an appointment as soon as possible for a visit in 2 days For further evaluation of symptoms 10 [address] [facility] [place] Orthopedic Surgery Schedule an appointment as soon as possible for a visit in 2 days For further evaluation of symptoms [address] [facility] [address] [address]  [address] [place] [address] Specialists [place], [address] [place], [place], [facility] [address]    [place] Surgeon  Schedule an appointment as soon as possible for a visit  For further evaluation of symptoms, Keep scheduled appointment Please follow up with the surgeons at [facility] for back pain       [address] [address] [place] [facility] Schedule an appointment as soon as possible for a visit in 2 days Renal cyst [address] [facility] [address] [address] [facility], [address] [place], [place], [place] Raheel          Discharge Medication List as of 5/21/2023 11:24 AM      START taking these medications    Details   Diclofenac Sodium (VOLTAREN) 1 % Apply 2 g topically 4 (four) times a day, Starting Sun 5/21/2023, Normal      ondansetron (ZOFRAN-ODT) 4 mg disintegrating tablet Take 1 tablet (4 mg total) by mouth every 6 (six) hours as needed for nausea or vomiting, Starting Sun 5/21/2023, Normal         CONTINUE these medications which have NOT CHANGED    Details   acetaminophen (TYLENOL) 325 mg tablet Take 2 tablets (650 mg total) by mouth every 6 (six) hours as needed for mild pain, moderate pain, headaches or fever, Starting u 10/13/2022, No Print      Ascorbic Acid (VITAMIN C PO) Take 500 mg by mouth daily  , Historical Med      cholecalciferol (VITAMIN D3) 1,000 units tablet Take 5,000 Units by mouth daily  , Historical Med      pantoprazole (PROTONIX) 40 mg tablet Take 1 tablet (40 mg total) by mouth daily, Starting u 5/18/2023, Until Wed 8/16/2023, Normal      Probiotic Product (PROBIOTIC PO) Take by mouth daily, Historical Med                 PDMP Review     None          ED Provider  Electronically Signed by           PIA Weber  05/21/23 3429

## 2023-05-21 NOTE — DISCHARGE INSTRUCTIONS
Return for worsening of symptoms  Please call GI tomorrow for further evaluation  A referral to the [facility] was placed

## 2023-05-22 ENCOUNTER — TELEPHONE (OUTPATIENT)
Dept: PHYSICAL THERAPY | Facility: OTHER | Age: 68
End: 2023-05-22

## 2023-05-22 NOTE — TELEPHONE ENCOUNTER
Call placed to the patient per [facility] referral     Spoke with patient, explained CSP and reason for the call  Patient declined services at the moment, she feels this has something to do with her bowel movement  Patient states she has an appt to see gastro and she is trying to see if she can have a colonoscopy soon  She feels this pain has nothing to do with her back  If she ever need our services she will call back  Phone number and hours of business provided  Patient also has a referral for Nephrology for Renal cyst     She is also established with [facility] in [place] for lumbar fusion   Will close CSP referral per protocol

## 2023-06-13 RX ORDER — SODIUM CHLORIDE, SODIUM LACTATE, POTASSIUM CHLORIDE, CALCIUM CHLORIDE 600; 310; 30; 20 MG/100ML; MG/100ML; MG/100ML; MG/100ML
20 INJECTION, SOLUTION INTRAVENOUS CONTINUOUS
Status: CANCELLED | OUTPATIENT
Start: 2023-06-13

## 2023-06-13 RX ORDER — SODIUM CHLORIDE, SODIUM LACTATE, POTASSIUM CHLORIDE, CALCIUM CHLORIDE 600; 310; 30; 20 MG/100ML; MG/100ML; MG/100ML; MG/100ML
125 INJECTION, SOLUTION INTRAVENOUS CONTINUOUS
Status: CANCELLED | OUTPATIENT
Start: 2023-06-13

## 2023-06-14 ENCOUNTER — HOSPITAL ENCOUNTER (OUTPATIENT)
Dept: PERIOP | Facility: HOSPITAL | Age: 68
Setting detail: OUTPATIENT SURGERY
Discharge: HOME | End: 2023-06-14
Attending: INTERNAL MEDICINE
Payer: MEDICARE

## 2023-06-14 ENCOUNTER — ANESTHESIA EVENT (OUTPATIENT)
Dept: PERIOP | Facility: HOSPITAL | Age: 68
End: 2023-06-14

## 2023-06-14 ENCOUNTER — ANESTHESIA (OUTPATIENT)
Dept: PERIOP | Facility: HOSPITAL | Age: 68
End: 2023-06-14

## 2023-06-14 VITALS
DIASTOLIC BLOOD PRESSURE: 75 MMHG | HEIGHT: 62 IN | HEART RATE: 60 BPM | OXYGEN SATURATION: 99 % | RESPIRATION RATE: 20 BRPM | SYSTOLIC BLOOD PRESSURE: 140 MMHG | WEIGHT: 157 LBS | BODY MASS INDEX: 28.89 KG/M2 | TEMPERATURE: 96 F

## 2023-06-14 DIAGNOSIS — R11.0 NAUSEA: ICD-10-CM

## 2023-06-14 DIAGNOSIS — K21.9 GASTROESOPHAGEAL REFLUX DISEASE WITHOUT ESOPHAGITIS: ICD-10-CM

## 2023-06-14 DIAGNOSIS — K59.04 CHRONIC IDIOPATHIC CONSTIPATION: ICD-10-CM

## 2023-06-14 PROBLEM — M54.9 BACK PAIN: Status: ACTIVE | Noted: 2023-06-14

## 2023-06-14 PROCEDURE — 88313 SPECIAL STAINS GROUP 2: CPT | Performed by: PATHOLOGY

## 2023-06-14 PROCEDURE — 45385 COLONOSCOPY W/LESION REMOVAL: CPT | Performed by: INTERNAL MEDICINE

## 2023-06-14 PROCEDURE — 88342 IMHCHEM/IMCYTCHM 1ST ANTB: CPT | Performed by: PATHOLOGY

## 2023-06-14 PROCEDURE — 88305 TISSUE EXAM BY PATHOLOGIST: CPT | Performed by: PATHOLOGY

## 2023-06-14 PROCEDURE — 43239 EGD BIOPSY SINGLE/MULTIPLE: CPT | Performed by: INTERNAL MEDICINE

## 2023-06-14 RX ORDER — PROPOFOL 10 MG/ML
INJECTION, EMULSION INTRAVENOUS CONTINUOUS PRN
Status: DISCONTINUED | OUTPATIENT
Start: 2023-06-14 | End: 2023-06-14

## 2023-06-14 RX ORDER — SODIUM CHLORIDE, SODIUM LACTATE, POTASSIUM CHLORIDE, CALCIUM CHLORIDE 600; 310; 30; 20 MG/100ML; MG/100ML; MG/100ML; MG/100ML
125 INJECTION, SOLUTION INTRAVENOUS CONTINUOUS
Status: DISCONTINUED | OUTPATIENT
Start: 2023-06-14 | End: 2023-06-18 | Stop reason: HOSPADM

## 2023-06-14 RX ORDER — LIDOCAINE HYDROCHLORIDE 20 MG/ML
INJECTION, SOLUTION EPIDURAL; INFILTRATION; INTRACAUDAL; PERINEURAL AS NEEDED
Status: DISCONTINUED | OUTPATIENT
Start: 2023-06-14 | End: 2023-06-14

## 2023-06-14 RX ORDER — SODIUM CHLORIDE, SODIUM LACTATE, POTASSIUM CHLORIDE, CALCIUM CHLORIDE 600; 310; 30; 20 MG/100ML; MG/100ML; MG/100ML; MG/100ML
20 INJECTION, SOLUTION INTRAVENOUS CONTINUOUS
Status: DISCONTINUED | OUTPATIENT
Start: 2023-06-14 | End: 2023-06-18 | Stop reason: HOSPADM

## 2023-06-14 RX ORDER — PROPOFOL 10 MG/ML
INJECTION, EMULSION INTRAVENOUS AS NEEDED
Status: DISCONTINUED | OUTPATIENT
Start: 2023-06-14 | End: 2023-06-14

## 2023-06-14 RX ADMIN — PROPOFOL 40 MG: 10 INJECTION, EMULSION INTRAVENOUS at 10:42

## 2023-06-14 RX ADMIN — LIDOCAINE HYDROCHLORIDE 100 MG: 20 INJECTION, SOLUTION EPIDURAL; INFILTRATION; INTRACAUDAL; PERINEURAL at 10:39

## 2023-06-14 RX ADMIN — PROPOFOL 50 MG: 10 INJECTION, EMULSION INTRAVENOUS at 10:45

## 2023-06-14 RX ADMIN — SODIUM CHLORIDE, SODIUM LACTATE, POTASSIUM CHLORIDE, AND CALCIUM CHLORIDE: .6; .31; .03; .02 INJECTION, SOLUTION INTRAVENOUS at 10:33

## 2023-06-14 RX ADMIN — PROPOFOL 40 MG: 10 INJECTION, EMULSION INTRAVENOUS at 10:49

## 2023-06-14 RX ADMIN — PROPOFOL 150 MCG/KG/MIN: 10 INJECTION, EMULSION INTRAVENOUS at 10:44

## 2023-06-14 RX ADMIN — PROPOFOL 80 MG: 10 INJECTION, EMULSION INTRAVENOUS at 10:39

## 2023-06-14 NOTE — ANESTHESIA POSTPROCEDURE EVALUATION
Post-Op Assessment Note    CV Status:  Stable    Pain management: adequate     Mental Status:  Alert and awake   Hydration Status:  Euvolemic   PONV Controlled:  Controlled   Airway Patency:  Patent      Post Op Vitals Reviewed: Yes      Staff: CRNA         No notable events documented      BP   111/63   Temp  97 3   Pulse  64   Resp   18   SpO2   98%

## 2023-06-14 NOTE — H&P
H&P EXAM - Outpatient Endoscopy   Josey Sterling 76 y o  female MRN: [id number]    [address] [facility] [address] [facility] [address]   Encounter: [id number]    History and Physical - [facility]  Josey Sterling 76 y o  female MRN: [id number]                  HPI: Josey Sterling is a 76y o  year old female who presents for gastric intestinal metaplasia, history of colon polyps, prior inadequate bowel prep      REVIEW OF SYSTEMS: Per the HPI, and otherwise unremarkable      Historical Information   Past Medical History:   Diagnosis Date   • Bell's palsy    • Colon polyp    • Hypertension    • Kidney calculi    • Spinal stenosis      Past Surgical History:   Procedure Laterality Date   • CHOLECYSTECTOMY     • COLONOSCOPY     • ENDOMETRIAL ABLATION     • LAMINECTOMY  06/14/2019    Lumbar spine - With placement of Trial device nonfusion device     Social History   Social History     Substance and Sexual Activity   Alcohol Use Never    Comment: occasionally     Social History     Substance and Sexual Activity   Drug Use Never     Social History     Tobacco Use   Smoking Status Never   Smokeless Tobacco Never     Family History   Problem Relation Age of Onset   • Dementia Mother    • Heart disease Mother    • Emphysema Father    • No Known Problems Daughter    • No Known Problems Maternal Grandmother    • No Known Problems Maternal Grandfather    • No Known Problems Paternal Grandmother    • No Known Problems Paternal Grandfather    • Colon cancer Maternal Uncle    • No Known Problems Maternal Uncle    • No Known Problems Maternal Uncle    • No Known Problems Maternal Uncle    • No Known Problems Maternal Uncle    • No Known Problems Son    • No Known Problems Brother        Meds/Allergies     (Not in a hospital admission)      Allergies   Allergen Reactions   • Tramadol Vomiting   • Ciprofloxacin Other (See Comments)     unknown   • Lisinopril      Pt had one dose and became became presyncopal   • "Prednisone GI Intolerance       Objective     /76   Pulse 70   Temp (!) 96 °F (35 6 °C) (Tympanic)   Resp 18   Ht 5' 2\" (1 575 m)   Wt 71 2 kg (157 lb)   SpO2 98%   BMI 28 72 kg/m²       PHYSICAL EXAM    Gen: NAD  CV: RRR  CHEST: Clear  ABD: soft, NT/ND  EXT: no edema      ASSESSMENT/PLAN:  This is a 76y o  year old female here for egd/colonoscopy, and she is stable and optimized for her procedure          "

## 2023-06-14 NOTE — ANESTHESIA PREPROCEDURE EVALUATION
Procedure:  COLONOSCOPY  EGD    Relevant Problems   ANESTHESIA (within normal limits)   (-) History of anesthesia complications      CARDIO (within normal limits)      ENDO (within normal limits)      GI/HEPATIC  Confirmed NPO appropriate  s/p bowel prep   (+) Gastroesophageal reflux disease      /RENAL (within normal limits)      HEMATOLOGY (within normal limits)      MUSCULOSKELETAL   (+) Back pain      NEURO/PSYCH (within normal limits)      PULMONARY (within normal limits)   (-) Smoking   (-) URI (upper respiratory infection)      Nervous and Auditory   (+) Bell's palsy        Physical Exam    Airway    Mallampati score: II  TM Distance: >3 FB  Neck ROM: full     Dental   No notable dental hx     Cardiovascular  Rhythm: regular, Rate: normal,     Pulmonary  Breath sounds clear to auscultation,     Other Findings        Anesthesia Plan  ASA Score- 2     Anesthesia Type- IV sedation with anesthesia with ASA Monitors  Additional Monitors:   Airway Plan:     Comment: I discussed the risks and benefits of IV sedation anesthesia including the possibility of the need to convert to general anesthesia and the potential risk of awareness  The patient was given the opportunity to ask questions, which were answered          Plan Factors-Exercise tolerance (METS): >4 METS  Chart reviewed  Patient is not a current smoker  Induction- intravenous  Postoperative Plan-     Informed Consent- Anesthetic plan and risks discussed with patient and spouse  I personally reviewed this patient with the CRNA  Discussed and agreed on the Anesthesia Plan with the CRNA  Arley Lai

## 2023-06-16 ENCOUNTER — TELEPHONE (OUTPATIENT)
Dept: GASTROENTEROLOGY | Facility: CLINIC | Age: 68
End: 2023-06-16

## 2023-06-16 NOTE — TELEPHONE ENCOUNTER
Last OV suni  Fu  Not scheduled  Pt had EGD/colon on 6/14/2023 by Dr Kajal Watkins     H/o as per last OV   GERD  LUQ pain  Constipation   Gen  abd pain    Medications  Ondansteron 4 mg q 6 hrs PRN  Pantoprazole 40 mg daily    Spoke with pt  Triage initiated  Pt denies alarming s/s at the time of this phone call  Reports last night had severe pain with dark black stool and BRBPR that has now resolved  Pt had multiple large polyps removed during colonoscopy  Pt denies bleeding at the time of this call and reports pain has improved  Discussed common post op gas pain & bleeding  Reviewed alarming s/s that require Ed evaluation  Reviewed symptom management for constipation and hemorrhoids    Pt agreeable to recs and verbalized understanding of all information

## 2023-06-16 NOTE — TELEPHONE ENCOUNTER
Patients GI provider:  Dr Bozena Gomez    Number to return call: [phone]     Reason for call: Pt calling stating she is still experiencing dark black stool and pain near chest that radiates to her back  Pt states she is taking pantoprazole      Scheduled procedure/appointment date if applicable: N/A

## 2023-06-19 ENCOUNTER — CONSULT (OUTPATIENT)
Dept: NEPHROLOGY | Facility: CLINIC | Age: 68
End: 2023-06-19
Payer: MEDICARE

## 2023-06-19 VITALS
HEART RATE: 80 BPM | HEIGHT: 62 IN | SYSTOLIC BLOOD PRESSURE: 124 MMHG | OXYGEN SATURATION: 97 % | BODY MASS INDEX: 29.81 KG/M2 | DIASTOLIC BLOOD PRESSURE: 88 MMHG | WEIGHT: 162 LBS

## 2023-06-19 DIAGNOSIS — R10.84 GENERALIZED ABDOMINAL PAIN: ICD-10-CM

## 2023-06-19 DIAGNOSIS — N20.0 NEPHROLITHIASIS: ICD-10-CM

## 2023-06-19 DIAGNOSIS — K21.9 GASTROESOPHAGEAL REFLUX DISEASE WITHOUT ESOPHAGITIS: ICD-10-CM

## 2023-06-19 DIAGNOSIS — N28.1 RENAL CYST: Primary | ICD-10-CM

## 2023-06-19 PROCEDURE — 99203 OFFICE O/P NEW LOW 30 MIN: CPT | Performed by: INTERNAL MEDICINE

## 2023-06-19 NOTE — PROGRESS NOTES
[facility]  Consultation - Nephrology    Ronald Carroll 76 y o  female MRN: [id number]      A/P:      1  Renal cyst  -     Ambulatory Referral to Nephrology  -     PTH, intact; Future; Expected date: 12/05/2023  -     Uric acid; Future; Expected date: 12/05/2023  -     Urinalysis with microscopic; Future; Expected date: 12/05/2023  -     Albumin / creatinine urine ratio; Future; Expected date: 12/05/2023  -     Comprehensive metabolic panel; Future; Expected date: 12/05/2023  -     US kidney and bladder; Future; Expected date: 12/05/2023    2  Nephrolithiasis    3  Gastroesophageal reflux disease without esophagitis    4  Generalized abdominal pain         I have reviewed pertinent labs and imaging with patient  1  Simple renal cyst- one or more noted on Ct 5/21  Explained that simple renal cysts are generally benign processes  Explained potential risk of infection/bleeding/rupture with renal cysts  Recommend renal US at 6 months to confirm stability, after that may be able to stop monitoring and refer back to PCP  Patient has no family hx bladder/ renal cancer  Renal function is at baseline, albumin normal  -Metabolic : acceptable  -Hgb :acceptable  -U/A no proteinuria/blood  -no signs/sx of UTI  2  GERD, continue PPI per primary/GI  3  Abdominal pain, improving sp EGD  Refer back to GI for follow up  4  Nephrolithiasis, isolated incident  If recurs can order stone risk profile  The patient and any family members present were counseled today on risks benefits and alternatives of any medications, and were agreeable to the treatment plan  They were counseled on diagnostic testing if necessary, and projected outcomes as are available and medically indicated  All questions were asked and answered during the encounter  If more questions are to arise the patient will call the office  Alarm and return precautions discussed and accepted         Thank you for allowing us to participate in the care of your patient  History of Present Illness   Physician Requesting Consult: No att  providers found    HPI: Billy Dixon is a 76y o  year old female who presents at discretion of ER for simple kidney cyst  She was in the ER on 5/21 for abdominal pain and had CT abdomen and pelvis that showed one or more simple renal cysts with otherwise unremarkable kidneys  No hydronephrosis noted  Denies family hx of kidney/bladder cancer  Reports nephrolithiasis in the past >10 years  This was an isolated incident that did not require intervention  Denies proteinuria/hematuria/Pyelo/UTI  Reports motrin use once a year  Drinks diet ice tea  Maybe 2 glasses of water per day  Four times a week has 2 ounces of coffee  Works at [employer] and reports 9 lb weight gain with stomach issue  Had endoscopy 1 week ago with epigastric pain that radiates to back  She has reflux  She had precancerous polyp 1 5 year ago and had repeat C scope 1 week ago  She had multiple polyps removed last week  On May chemistry, Cr stable at 0 7 with eGFR 82 ml/min  UA was bland without evidence of blood or protein  Constitutional ROS- gained 9 lb since stomach issue  HEENT ROS- Denies headaches or history of trauma, blurred vision     Endocrine ROS- No history diabetes mellitus or thyroid disease  Cardiovascular ROS- Denies chest pain, palpitation, dyspnea exertion, orthopnea, claudication  Pulmonary ROS-  Denies cough, hemoptysis, shortness of breath  GI ROS- GERD   Hematological ROS- Denies history of easy bruising, blood clots, bleeding or blood transfusions  Genitourinary ROS- Denies recent hematuria, pyuria, flank pain, change in urinary stream, decreased urinary output, increased urinary frequency, nocturia, foamy urine, or urinary incontinence  Lymphatic ROS- Denies lymphadenopathy  Musculoskeletal ROS- Denies history of muscle weakness, joint pain    Dermatological ROS- Denies rash, wounds, ulcers, itching, jaundice  Psychiatric ROS- Denies hallucinations, disorientation  Neurological ROS- No stroke or TIA symptoms       Historical Information   Past Medical History:   Diagnosis Date   • Bell's palsy    • Colon polyp    • Hypertension    • Kidney calculi    • Spinal stenosis      Past Surgical History:   Procedure Laterality Date   • CHOLECYSTECTOMY     • COLONOSCOPY     • ENDOMETRIAL ABLATION     • LAMINECTOMY  06/14/2019    Lumbar spine - With placement of Trial device nonfusion device     Social History   Social History     Substance and Sexual Activity   Alcohol Use Never    Comment: occasionally     Social History     Substance and Sexual Activity   Drug Use Never     Social History     Tobacco Use   Smoking Status Never   Smokeless Tobacco Never     Family History   Problem Relation Age of Onset   • Dementia Mother    • Heart disease Mother    • Emphysema Father    • No Known Problems Daughter    • No Known Problems Maternal Grandmother    • No Known Problems Maternal Grandfather    • No Known Problems Paternal Grandmother    • No Known Problems Paternal Grandfather    • Colon cancer Maternal Uncle    • No Known Problems Maternal Uncle    • No Known Problems Maternal Uncle    • No Known Problems Maternal Uncle    • No Known Problems Maternal Uncle    • No Known Problems Son    • No Known Problems Brother        Meds/Allergies   all current active meds have been reviewed, current meds:     Current Outpatient Medications:   •  Ascorbic Acid (VITAMIN C PO), Take 500 mg by mouth daily  , Disp: , Rfl:   •  cholecalciferol (VITAMIN D3) 1,000 units tablet, Take 5,000 Units by mouth daily  , Disp: , Rfl:   •  pantoprazole (PROTONIX) 40 mg tablet, Take 1 tablet (40 mg total) by mouth daily, Disp: 90 tablet, Rfl: 0  •  Probiotic Product (PROBIOTIC PO), Take by mouth daily, Disp: , Rfl:   •  acetaminophen (TYLENOL) 325 mg tablet, Take 2 tablets (650 mg total) by mouth every 6 (six) hours as needed for mild pain, moderate pain, headaches or fever (Patient not taking: Reported on 6/19/2023), Disp: , Rfl: 0  •  Diclofenac Sodium (VOLTAREN) 1 %, Apply 2 g topically 4 (four) times a day (Patient not taking: Reported on 6/19/2023), Disp: 2 g, Rfl: 0  •  ondansetron (ZOFRAN-ODT) 4 mg disintegrating tablet, Take 1 tablet (4 mg total) by mouth every 6 (six) hours as needed for nausea or vomiting (Patient not taking: Reported on 6/19/2023), Disp: 20 tablet, Rfl: 0     Allergies   Allergen Reactions   • Tramadol Vomiting   • Ciprofloxacin Other (See Comments)     unknown   • Lisinopril      Pt had one dose and became became presyncopal   • Prednisone GI Intolerance       Objective     Vitals:    06/19/23 0850   BP: 124/88   Pulse: 80   SpO2: 97%       General Appearance:    No acute distress  Cooperative  Appears stated age  Head:    Normocephalic  Atraumatic  Eyes:    Lids, conjunctiva normal  No scleral icterus  Ears:    Normal external ears  Nose:   Nares normal  No drainage  Mouth:   Lips, tongue normal  Mucosa normal     Neck:   Supple  Symmetrical    Back:     Symmetric  No CVA tenderness  Lungs:     Normal respiratory effort  Clear to auscultation bilaterally  Chest wall:    No tenderness or deformity  Heart:    Regular rate and rhythm  Normal S1 and S2  No murmur  No JVD  No edema  Abdomen:     Soft  Non-tender  Bowel sounds active  Extremities:   Extremities normal  Atraumatic  No cyanosis  Skin:   Warm and dry  No pallor, jaundice, rash, ecchymoses  Neurologic:   Alert and oriented to person, place, time  No focal deficit  Current Weight: Weight - Scale: 73 5 kg (162 lb)    First Weight:    Wt Readings from Last 3 Encounters:   06/19/23 73 5 kg (162 lb)   06/14/23 71 2 kg (157 lb)   05/21/23 72 6 kg (160 lb)        Lab Results:  I have personally reviewed pertinent labs            5/21/23   Na 138 K 4 2 Cl 104 TCO2 26 BUN 23 Cr 0 75 C 9 9  2/27/23   Na 138 K 4 7 Cl 104 TCO2 25 BUN 24 Cr 0 75 Ca 9 7     Radiology review:  KIDNEYS/URETERS: One or more simple renal cyst(s) is noted  Otherwise unremarkable kidneys  No hydronephrosis  Tellis Courser, DO      This consultation note was produced in part using a dictation device which may document imprecise wording from author's original intent

## 2023-06-19 NOTE — PATIENT INSTRUCTIONS
Most recent kidney function is 82%  You have one or more simple cysts on your kidneys  Generally these are benign  Will check a kidney ultrasound in 6 months with blood work around that time  Follow up in 6 months  Rarely these cyst can bleed, become infected or rupture  No blood noticed on your recent urinalysis  No medication changes

## 2023-06-20 PROCEDURE — 88342 IMHCHEM/IMCYTCHM 1ST ANTB: CPT | Performed by: PATHOLOGY

## 2023-06-20 PROCEDURE — 88305 TISSUE EXAM BY PATHOLOGIST: CPT | Performed by: PATHOLOGY

## 2023-06-20 PROCEDURE — 88313 SPECIAL STAINS GROUP 2: CPT | Performed by: PATHOLOGY

## 2023-06-20 NOTE — RESULT ENCOUNTER NOTE
Results relayed via Mychart    Multiple small tubular adenomas    Repeat colonoscopy in 3 years  Gastric biopsies are now negative for intestinal metaplasia ,  Otherwise unremarkable pathology

## 2023-08-30 ENCOUNTER — APPOINTMENT (OUTPATIENT)
Dept: RADIOLOGY | Facility: MEDICAL CENTER | Age: 68
End: 2023-08-30
Payer: MEDICARE

## 2023-08-30 ENCOUNTER — OFFICE VISIT (OUTPATIENT)
Dept: URGENT CARE | Facility: MEDICAL CENTER | Age: 68
End: 2023-08-30
Payer: MEDICARE

## 2023-08-30 VITALS
TEMPERATURE: 97.7 F | RESPIRATION RATE: 18 BRPM | HEART RATE: 82 BPM | BODY MASS INDEX: 30.36 KG/M2 | HEIGHT: 62 IN | WEIGHT: 165 LBS | OXYGEN SATURATION: 98 % | SYSTOLIC BLOOD PRESSURE: 130 MMHG | DIASTOLIC BLOOD PRESSURE: 70 MMHG

## 2023-08-30 DIAGNOSIS — M79.18 RIGHT BUTTOCK PAIN: ICD-10-CM

## 2023-08-30 DIAGNOSIS — S70.11XA CONTUSION OF RIGHT THIGH, INITIAL ENCOUNTER: ICD-10-CM

## 2023-08-30 DIAGNOSIS — S39.92XA INJURY OF BACK, INITIAL ENCOUNTER: ICD-10-CM

## 2023-08-30 DIAGNOSIS — M54.31 SCIATICA OF RIGHT SIDE: Primary | ICD-10-CM

## 2023-08-30 PROCEDURE — 99212 OFFICE O/P EST SF 10 MIN: CPT | Performed by: PHYSICIAN ASSISTANT

## 2023-08-30 PROCEDURE — 72100 X-RAY EXAM L-S SPINE 2/3 VWS: CPT

## 2023-08-30 PROCEDURE — 73502 X-RAY EXAM HIP UNI 2-3 VIEWS: CPT

## 2023-08-30 PROCEDURE — G0463 HOSPITAL OUTPT CLINIC VISIT: HCPCS | Performed by: PHYSICIAN ASSISTANT

## 2023-08-30 NOTE — PROGRESS NOTES
[facility]        NAME: Masha Nair is a 76 y.o. female  : 1955    MRN: [id number]  DATE: 2023  TIME: 5:55 PM    Assessment and Plan   Sciatica of right side [M54.31]  1. Sciatica of right side        2. Contusion of right thigh, initial encounter        3. Injury of back, initial encounter  XR spine lumbar 2 or 3 views injury      4. Right buttock pain  XR hip/pelv 2-3 vws right if performed            Patient Instructions       Follow up with PCP in 3-5 days. Proceed to  ER if symptoms worsen. Chief Complaint     Chief Complaint   Patient presents with   • Fall     Was standing on a swivel stool and fell off on to another stool. C/o of pain that travels down the right leg. C/o of right hip area. Patient states after she fell she got dizzy and nausea. History of Present Illness       Patient fell off a swivel stool and landed on another swivel still injuring her low back and right buttock. Having pain in her back which radiates down into her right lower extremity this is alleviated with this and tingling. Denies bladder or bladder incontinence saddle anesthesia lower extremity muscle weakness. Review of Systems   Review of Systems   Constitutional: Negative for fever. Genitourinary: Negative for difficulty urinating. Musculoskeletal: Positive for back pain. Right thigh bruise and pain   Neurological: Positive for numbness. Negative for weakness.          Current Medications       Current Outpatient Medications:   •  acetaminophen (TYLENOL) 325 mg tablet, Take 2 tablets (650 mg total) by mouth every 6 (six) hours as needed for mild pain, moderate pain, headaches or fever, Disp: , Rfl: 0  •  Ascorbic Acid (VITAMIN C PO), Take 500 mg by mouth daily  , Disp: , Rfl:   •  cholecalciferol (VITAMIN D3) 1,000 units tablet, Take 5,000 Units by mouth daily  , Disp: , Rfl:   •  Diclofenac Sodium (VOLTAREN) 1 %, Apply 2 g topically 4 (four) times a day, Disp: 2 g, Rfl: 0  •  ondansetron (ZOFRAN-ODT) 4 mg disintegrating tablet, Take 1 tablet (4 mg total) by mouth every 6 (six) hours as needed for nausea or vomiting, Disp: 20 tablet, Rfl: 0  •  Probiotic Product (PROBIOTIC PO), Take by mouth daily, Disp: , Rfl:   •  pantoprazole (PROTONIX) 40 mg tablet, Take 1 tablet (40 mg total) by mouth daily, Disp: 90 tablet, Rfl: 0    Current Allergies     Allergies as of 08/30/2023 - Reviewed 08/30/2023   Allergen Reaction Noted   • Tramadol Vomiting 04/28/2016   • Ciprofloxacin Other (See Comments) 04/28/2016   • Lisinopril  12/09/2017   • Prednisone GI Intolerance 02/29/2016            The following portions of the patient's history were reviewed and updated as appropriate: allergies, current medications, past family history, past medical history, past social history, past surgical history and problem list.     Past Medical History:   Diagnosis Date   • Bell's palsy    • Colon polyp    • Hypertension    • Kidney calculi    • Spinal stenosis        Past Surgical History:   Procedure Laterality Date   • CHOLECYSTECTOMY     • COLONOSCOPY     • ENDOMETRIAL ABLATION     • LAMINECTOMY  06/14/2019    Lumbar spine - With placement of Trial device nonfusion device       Family History   Problem Relation Age of Onset   • Dementia Mother    • Heart disease Mother    • Emphysema Father    • No Known Problems Daughter    • No Known Problems Maternal Grandmother    • No Known Problems Maternal Grandfather    • No Known Problems Paternal Grandmother    • No Known Problems Paternal Grandfather    • Colon cancer Maternal Uncle    • No Known Problems Maternal Uncle    • No Known Problems Maternal Uncle    • No Known Problems Maternal Uncle    • No Known Problems Maternal Uncle    • No Known Problems Son    • No Known Problems Brother          Medications have been verified.         Objective   /70   Pulse 82   Temp 97.7 °F (36.5 °C)   Resp 18   Ht 5' 2" (1.575 m)   Wt 74.8 kg (165 lb) SpO2 98%   BMI 30.18 kg/m²   No LMP recorded. Patient is postmenopausal.       Physical Exam     Physical Exam  Vitals and nursing note reviewed. Constitutional:       Appearance: Normal appearance. HENT:      Head: Normocephalic and atraumatic. Cardiovascular:      Rate and Rhythm: Normal rate. Pulmonary:      Effort: Pulmonary effort is normal.   Musculoskeletal:      Comments: Right ischial tenderness   Skin:     General: Skin is warm. Neurological:      Mental Status: She is alert. For some lumbar spine x-rays no acute bony abnormality.   Hardware of the L-spine appears stable

## 2023-09-16 ENCOUNTER — OFFICE VISIT (OUTPATIENT)
Dept: URGENT CARE | Facility: MEDICAL CENTER | Age: 68
End: 2023-09-16
Payer: MEDICARE

## 2023-09-16 VITALS
RESPIRATION RATE: 20 BRPM | SYSTOLIC BLOOD PRESSURE: 124 MMHG | HEART RATE: 76 BPM | WEIGHT: 164.4 LBS | TEMPERATURE: 98.3 F | BODY MASS INDEX: 30.07 KG/M2 | OXYGEN SATURATION: 98 % | DIASTOLIC BLOOD PRESSURE: 78 MMHG

## 2023-09-16 DIAGNOSIS — R05.1 ACUTE COUGH: ICD-10-CM

## 2023-09-16 DIAGNOSIS — J06.9 ACUTE URI: Primary | ICD-10-CM

## 2023-09-16 PROCEDURE — G0463 HOSPITAL OUTPT CLINIC VISIT: HCPCS

## 2023-09-16 PROCEDURE — 99212 OFFICE O/P EST SF 10 MIN: CPT

## 2023-09-16 RX ORDER — AMOXICILLIN AND CLAVULANATE POTASSIUM 875; 125 MG/1; MG/1
1 TABLET, FILM COATED ORAL EVERY 12 HOURS SCHEDULED
Qty: 14 TABLET | Refills: 0 | Status: SHIPPED | OUTPATIENT
Start: 2023-09-16 | End: 2023-09-23

## 2023-09-16 RX ORDER — GUAIFENESIN 600 MG/1
1200 TABLET, EXTENDED RELEASE ORAL EVERY 12 HOURS SCHEDULED
Qty: 30 TABLET | Refills: 0 | Status: SHIPPED | OUTPATIENT
Start: 2023-09-16

## 2023-09-16 NOTE — PROGRESS NOTES
[facility]        NAME: Tiff Lynne is a 76 y.o. female  : 1955    MRN: [id number]  DATE: 2023  TIME: 12:26 PM    Assessment and Plan   Acute URI [J06.9]  1. Acute URI  amoxicillin-clavulanate (AUGMENTIN) 875-125 mg per tablet      2. Acute cough  guaiFENesin (MUCINEX) 600 mg 12 hr tablet            Patient Instructions       Follow up with PCP in 3-5 days. Proceed to  ER if symptoms worsen. Chief Complaint     Chief Complaint   Patient presents with   • Cold Like Symptoms     Congestion and cough. Some wheezing. Using OTC cough meds and Halls. COVID neg x 3. History of Present Illness       Has taken x3 COVID tests at home all negative. She has had sinus congestion, cough, wheezing and has been using some OTC cough medications and throat lozenges. Symptoms x1.5wks without improvement. She has already gone through almost 2 bottles of the mucinex DM and Robitussin DM without much improvement. Continues to have cough with some productive. Review of Systems   Review of Systems   Constitutional: Negative for appetite change, chills, fatigue and fever. HENT: Positive for congestion, postnasal drip and rhinorrhea. Negative for ear pain, sinus pressure, sinus pain, sore throat and trouble swallowing. Respiratory: Positive for cough and chest tightness. Negative for shortness of breath. Cardiovascular: Negative for chest pain and palpitations. Gastrointestinal: Negative for abdominal pain, constipation, diarrhea, nausea and vomiting. Musculoskeletal: Negative for arthralgias and back pain. Skin: Negative for color change and rash. Neurological: Negative for dizziness, light-headedness and headaches. All other systems reviewed and are negative.         Current Medications       Current Outpatient Medications:   •  acetaminophen (TYLENOL) 325 mg tablet, Take 2 tablets (650 mg total) by mouth every 6 (six) hours as needed for mild pain, moderate pain, headaches or fever, Disp: , Rfl: 0  •  amoxicillin-clavulanate (AUGMENTIN) 875-125 mg per tablet, Take 1 tablet by mouth every 12 (twelve) hours for 7 days, Disp: 14 tablet, Rfl: 0  •  Ascorbic Acid (VITAMIN C PO), Take 500 mg by mouth daily  , Disp: , Rfl:   •  cholecalciferol (VITAMIN D3) 1,000 units tablet, Take 5,000 Units by mouth daily  , Disp: , Rfl:   •  Diclofenac Sodium (VOLTAREN) 1 %, Apply 2 g topically 4 (four) times a day, Disp: 2 g, Rfl: 0  •  guaiFENesin (MUCINEX) 600 mg 12 hr tablet, Take 2 tablets (1,200 mg total) by mouth every 12 (twelve) hours, Disp: 30 tablet, Rfl: 0  •  ondansetron (ZOFRAN-ODT) 4 mg disintegrating tablet, Take 1 tablet (4 mg total) by mouth every 6 (six) hours as needed for nausea or vomiting, Disp: 20 tablet, Rfl: 0  •  Probiotic Product (PROBIOTIC PO), Take by mouth daily, Disp: , Rfl:   •  pantoprazole (PROTONIX) 40 mg tablet, Take 1 tablet (40 mg total) by mouth daily, Disp: 90 tablet, Rfl: 0    Current Allergies     Allergies as of 09/16/2023 - Reviewed 09/16/2023   Allergen Reaction Noted   • Tramadol Vomiting 04/28/2016   • Ciprofloxacin Other (See Comments) 04/28/2016   • Lisinopril  12/09/2017   • Prednisone GI Intolerance 02/29/2016            The following portions of the patient's history were reviewed and updated as appropriate: allergies, current medications, past family history, past medical history, past social history, past surgical history and problem list.     Past Medical History:   Diagnosis Date   • Bell's palsy    • Colon polyp    • Hypertension    • Kidney calculi    • Spinal stenosis        Past Surgical History:   Procedure Laterality Date   • CHOLECYSTECTOMY     • COLONOSCOPY     • ENDOMETRIAL ABLATION     • LAMINECTOMY  06/14/2019    Lumbar spine - With placement of Trial device nonfusion device       Family History   Problem Relation Age of Onset   • Dementia Mother    • Heart disease Mother    • Emphysema Father    • No Known Problems Daughter    • No Known Problems Maternal Grandmother    • No Known Problems Maternal Grandfather    • No Known Problems Paternal Grandmother    • No Known Problems Paternal Grandfather    • Colon cancer Maternal Uncle    • No Known Problems Maternal Uncle    • No Known Problems Maternal Uncle    • No Known Problems Maternal Uncle    • No Known Problems Maternal Uncle    • No Known Problems Son    • No Known Problems Brother          Medications have been verified. Objective   /78   Pulse 76   Temp 98.3 °F (36.8 °C)   Resp 20   Wt 74.6 kg (164 lb 6.4 oz)   SpO2 98%   BMI 30.07 kg/m²        Physical Exam     Physical Exam  Vitals and nursing note reviewed. Constitutional:       General: She is not in acute distress. Appearance: Normal appearance. She is normal weight. She is not ill-appearing. HENT:      Head: Normocephalic and atraumatic. Right Ear: Ear canal and external ear normal. Tympanic membrane is bulging. Left Ear: Ear canal and external ear normal. Tympanic membrane is bulging. Nose: Congestion and rhinorrhea present. Rhinorrhea is clear. Mouth/Throat:      Lips: Pink. Mouth: Mucous membranes are moist.      Pharynx: Oropharynx is clear. Uvula midline. No pharyngeal swelling, oropharyngeal exudate, posterior oropharyngeal erythema or uvula swelling. Tonsils: No tonsillar exudate or tonsillar abscesses. 0 on the right. 0 on the left. Eyes:      Extraocular Movements: Extraocular movements intact. Conjunctiva/sclera: Conjunctivae normal.      Pupils: Pupils are equal, round, and reactive to light. Cardiovascular:      Rate and Rhythm: Normal rate and regular rhythm. Pulses: Normal pulses. Heart sounds: Normal heart sounds. Pulmonary:      Effort: Pulmonary effort is normal.      Breath sounds: Rhonchi present. Abdominal:      General: Abdomen is flat. Bowel sounds are normal.      Palpations: Abdomen is soft.    Musculoskeletal: General: Normal range of motion. Cervical back: Normal range of motion and neck supple. Skin:     General: Skin is warm and dry. Capillary Refill: Capillary refill takes less than 2 seconds. Neurological:      General: No focal deficit present. Mental Status: She is alert and oriented to person, place, and time.    Psychiatric:         Mood and Affect: Mood normal.         Behavior: Behavior normal.

## 2023-09-16 NOTE — PATIENT INSTRUCTIONS
You may take over the counter Tylenol (Acetaminophen) and/or Motrin (Ibuprofen) as needed, as directed on packaging. Be sure to get plenty of rest, and drinking fluids to remain hydrated. Over the counter decongestants can be used, only as directed on the box. However if you have any history of cardiac disease or high blood pressure these should be avoided, as we caution the use of these since they can make place strain on your heart and cause increase in blood pressure. It is recommended in lieu of decongestants to use cool mist vaporizer, saline nasal spray to relieve nasal congestion. It is also important to remain hydrated and drink plenty of fluids (avoiding caffeine and alcohol).

## 2023-09-26 ENCOUNTER — HOSPITAL ENCOUNTER (OUTPATIENT)
Dept: MRI IMAGING | Facility: HOSPITAL | Age: 68
Discharge: HOME | End: 2023-09-26
Payer: MEDICARE

## 2023-09-26 DIAGNOSIS — M54.16 RADICULOPATHY, LUMBAR REGION: ICD-10-CM

## 2023-09-26 PROCEDURE — 72148 MRI LUMBAR SPINE W/O DYE: CPT

## 2023-10-04 ENCOUNTER — OFFICE VISIT (OUTPATIENT)
Dept: URGENT CARE | Facility: MEDICAL CENTER | Age: 68
End: 2023-10-04
Payer: MEDICARE

## 2023-10-04 VITALS
WEIGHT: 158 LBS | SYSTOLIC BLOOD PRESSURE: 154 MMHG | DIASTOLIC BLOOD PRESSURE: 92 MMHG | TEMPERATURE: 97.4 F | HEART RATE: 83 BPM | RESPIRATION RATE: 18 BRPM | OXYGEN SATURATION: 95 % | BODY MASS INDEX: 28.9 KG/M2

## 2023-10-04 DIAGNOSIS — N30.01 ACUTE CYSTITIS WITH HEMATURIA: Primary | ICD-10-CM

## 2023-10-04 LAB
SL AMB  POCT GLUCOSE, UA: ABNORMAL
SL AMB LEUKOCYTE ESTERASE,UA: ABNORMAL
SL AMB POCT BILIRUBIN,UA: ABNORMAL
SL AMB POCT BLOOD,UA: ABNORMAL
SL AMB POCT CLARITY,UA: ABNORMAL
SL AMB POCT COLOR,UA: YELLOW
SL AMB POCT KETONES,UA: ABNORMAL
SL AMB POCT NITRITE,UA: ABNORMAL
SL AMB POCT PH,UA: 5
SL AMB POCT SPECIFIC GRAVITY,UA: 1.02
SL AMB POCT URINE PROTEIN: ABNORMAL
SL AMB POCT UROBILINOGEN: 0.2

## 2023-10-04 PROCEDURE — 87077 CULTURE AEROBIC IDENTIFY: CPT | Performed by: PHYSICIAN ASSISTANT

## 2023-10-04 PROCEDURE — 99213 OFFICE O/P EST LOW 20 MIN: CPT | Performed by: PHYSICIAN ASSISTANT

## 2023-10-04 PROCEDURE — G0463 HOSPITAL OUTPT CLINIC VISIT: HCPCS | Performed by: PHYSICIAN ASSISTANT

## 2023-10-04 PROCEDURE — 81002 URINALYSIS NONAUTO W/O SCOPE: CPT | Performed by: PHYSICIAN ASSISTANT

## 2023-10-04 PROCEDURE — 87086 URINE CULTURE/COLONY COUNT: CPT | Performed by: PHYSICIAN ASSISTANT

## 2023-10-04 PROCEDURE — 87186 SC STD MICRODIL/AGAR DIL: CPT | Performed by: PHYSICIAN ASSISTANT

## 2023-10-04 RX ORDER — CEPHALEXIN 500 MG/1
500 CAPSULE ORAL EVERY 12 HOURS SCHEDULED
Qty: 14 CAPSULE | Refills: 0 | Status: SHIPPED | OUTPATIENT
Start: 2023-10-04 | End: 2023-10-11

## 2023-10-04 NOTE — PROGRESS NOTES
[facility]        NAME: Nae Masters is a 76 y.o. female  : 1955    MRN: [id number]  DATE: 2023  TIME: 6:29 PM    Assessment and Plan   Acute cystitis with hematuria [N30.01]  1. Acute cystitis with hematuria  POCT urine dip    Urine culture    cephalexin (KEFLEX) 500 mg capsule            Patient Instructions       Follow up with PCP in 3-5 days. Proceed to  ER if symptoms worsen. Chief Complaint     Chief Complaint   Patient presents with   • Possible UTI     Frequent urination and bladder spasms. Started on AZO Friday. [place] better  but Sx have returned. History of Present Illness       Patient is a 77 y/o/f presenting to [facility] with urinary symptoms. Pt reports symptoms began last week and took Azo's which symptoms improved. Patient reports symptoms returned and is experiencing frequency and bladder spasms. No fevers. Urinary Tract Infection   This is a new problem. The current episode started in the past 7 days. The problem occurs intermittently. The problem has been gradually worsening. There has been no fever. Associated symptoms include frequency. Pertinent negatives include no chills, hematuria or vomiting. Review of Systems   Review of Systems   Constitutional: Negative for chills and fever. HENT: Negative for ear pain and sore throat. Eyes: Negative for pain and visual disturbance. Respiratory: Negative for cough and shortness of breath. Cardiovascular: Negative for chest pain and palpitations. Gastrointestinal: Negative for abdominal pain and vomiting. Genitourinary: Positive for dysuria and frequency. Negative for hematuria. Musculoskeletal: Negative for arthralgias and back pain. Skin: Negative for color change and rash. Neurological: Negative for seizures and syncope. All other systems reviewed and are negative.         Current Medications       Current Outpatient Medications:   •  cephalexin (KEFLEX) 500 mg capsule, Take 1 capsule (500 mg total) by mouth every 12 (twelve) hours for 7 days, Disp: 14 capsule, Rfl: 0  •  acetaminophen (TYLENOL) 325 mg tablet, Take 2 tablets (650 mg total) by mouth every 6 (six) hours as needed for mild pain, moderate pain, headaches or fever, Disp: , Rfl: 0  •  Ascorbic Acid (VITAMIN C PO), Take 500 mg by mouth daily  , Disp: , Rfl:   •  cholecalciferol (VITAMIN D3) 1,000 units tablet, Take 5,000 Units by mouth daily  , Disp: , Rfl:   •  Diclofenac Sodium (VOLTAREN) 1 %, Apply 2 g topically 4 (four) times a day, Disp: 2 g, Rfl: 0  •  guaiFENesin (MUCINEX) 600 mg 12 hr tablet, Take 2 tablets (1,200 mg total) by mouth every 12 (twelve) hours, Disp: 30 tablet, Rfl: 0  •  ondansetron (ZOFRAN-ODT) 4 mg disintegrating tablet, Take 1 tablet (4 mg total) by mouth every 6 (six) hours as needed for nausea or vomiting, Disp: 20 tablet, Rfl: 0  •  pantoprazole (PROTONIX) 40 mg tablet, Take 1 tablet (40 mg total) by mouth daily, Disp: 90 tablet, Rfl: 0  •  Probiotic Product (PROBIOTIC PO), Take by mouth daily, Disp: , Rfl:     Current Allergies     Allergies as of 10/04/2023 - Reviewed 10/04/2023   Allergen Reaction Noted   • Tramadol Vomiting 04/28/2016   • Ciprofloxacin Other (See Comments) 04/28/2016   • Lisinopril  12/09/2017   • Prednisone GI Intolerance 02/29/2016            The following portions of the patient's history were reviewed and updated as appropriate: allergies, current medications, past family history, past medical history, past social history, past surgical history and problem list.     Past Medical History:   Diagnosis Date   • Bell's palsy    • Colon polyp    • Hypertension    • Kidney calculi    • Spinal stenosis        Past Surgical History:   Procedure Laterality Date   • CHOLECYSTECTOMY     • COLONOSCOPY     • ENDOMETRIAL ABLATION     • LAMINECTOMY  06/14/2019    Lumbar spine - With placement of Trial device nonfusion device       Family History   Problem Relation Age of Onset   • Dementia Mother    • Heart disease Mother    • Emphysema Father    • No Known Problems Daughter    • No Known Problems Maternal Grandmother    • No Known Problems Maternal Grandfather    • No Known Problems Paternal Grandmother    • No Known Problems Paternal Grandfather    • Colon cancer Maternal Uncle    • No Known Problems Maternal Uncle    • No Known Problems Maternal Uncle    • No Known Problems Maternal Uncle    • No Known Problems Maternal Uncle    • No Known Problems Son    • No Known Problems Brother          Medications have been verified. Objective   /92   Pulse 83   Temp (!) 97.4 °F (36.3 °C)   Resp 18   Wt 71.7 kg (158 lb)   SpO2 95%   BMI 28.90 kg/m²   No LMP recorded. Patient is postmenopausal.       Physical Exam     Physical Exam  Constitutional:       Appearance: Normal appearance. HENT:      Head: Normocephalic and atraumatic. Nose: Nose normal.      Mouth/Throat:      Mouth: Mucous membranes are moist.   Eyes:      Extraocular Movements: Extraocular movements intact. Conjunctiva/sclera: Conjunctivae normal.      Pupils: Pupils are equal, round, and reactive to light. Cardiovascular:      Rate and Rhythm: Normal rate and regular rhythm. Pulmonary:      Effort: Pulmonary effort is normal.   Musculoskeletal:         General: Normal range of motion. Cervical back: Normal range of motion and neck supple. Skin:     General: Skin is warm and dry. Capillary Refill: Capillary refill takes less than 2 seconds. Neurological:      General: No focal deficit present. Mental Status: She is alert and oriented to person, place, and time.    Psychiatric:         Mood and Affect: Mood normal.         Behavior: Behavior normal.

## 2023-10-06 LAB — BACTERIA UR CULT: ABNORMAL

## 2023-10-16 ENCOUNTER — TELEPHONE (OUTPATIENT)
Dept: FAMILY MEDICINE CLINIC | Facility: CLINIC | Age: 68
End: 2023-10-16

## 2023-10-16 ENCOUNTER — LAB (OUTPATIENT)
Dept: LAB | Facility: MEDICAL CENTER | Age: 68
End: 2023-10-16
Payer: MEDICARE

## 2023-10-16 DIAGNOSIS — N39.0 URINARY TRACT INFECTION WITHOUT HEMATURIA, SITE UNSPECIFIED: Primary | ICD-10-CM

## 2023-10-16 DIAGNOSIS — N39.0 URINARY TRACT INFECTION WITHOUT HEMATURIA, SITE UNSPECIFIED: ICD-10-CM

## 2023-10-16 LAB
BACTERIA UR QL AUTO: ABNORMAL /HPF
BILIRUB UR QL STRIP: NEGATIVE
CLARITY UR: CLEAR
COLOR UR: ABNORMAL
GLUCOSE UR STRIP-MCNC: NEGATIVE MG/DL
HGB UR QL STRIP.AUTO: NEGATIVE
KETONES UR STRIP-MCNC: NEGATIVE MG/DL
LEUKOCYTE ESTERASE UR QL STRIP: ABNORMAL
NITRITE UR QL STRIP: NEGATIVE
NON-SQ EPI CELLS URNS QL MICRO: ABNORMAL /HPF
PH UR STRIP.AUTO: 6.5 [PH]
PROT UR STRIP-MCNC: ABNORMAL MG/DL
RBC #/AREA URNS AUTO: ABNORMAL /HPF
SP GR UR STRIP.AUTO: 1.02 (ref 1–1.03)
UROBILINOGEN UR STRIP-ACNC: <2 MG/DL
WBC #/AREA URNS AUTO: ABNORMAL /HPF

## 2023-10-16 PROCEDURE — 81001 URINALYSIS AUTO W/SCOPE: CPT

## 2023-10-16 PROCEDURE — 87086 URINE CULTURE/COLONY COUNT: CPT

## 2023-10-16 NOTE — TELEPHONE ENCOUNTER
Patient states she had UTI and was treated about 1 week ago. Feels like she is still having symptoms. Ordered Urine test to see if she still has infection.

## 2023-10-17 LAB — BACTERIA UR CULT: NORMAL

## 2023-10-17 NOTE — RESULT ENCOUNTER NOTE
Please call the patient regarding her abnormal result.   Her urinalysis shows a lot of white blood cells but no bacteria and it is negative for nitrite so that implies that it is not a urinary tract infection and that she probably he is contaminating the urine container with vaginal secretions less she is already on antibiotics if that is the case she should finish out her antibiotics and then repeat a urine culture

## 2023-10-18 ENCOUNTER — OFFICE VISIT (OUTPATIENT)
Dept: URGENT CARE | Facility: MEDICAL CENTER | Age: 68
End: 2023-10-18
Payer: MEDICARE

## 2023-10-18 ENCOUNTER — TELEPHONE (OUTPATIENT)
Dept: FAMILY MEDICINE CLINIC | Facility: CLINIC | Age: 68
End: 2023-10-18

## 2023-10-18 VITALS
BODY MASS INDEX: 28.91 KG/M2 | RESPIRATION RATE: 16 BRPM | OXYGEN SATURATION: 98 % | WEIGHT: 158.07 LBS | SYSTOLIC BLOOD PRESSURE: 142 MMHG | DIASTOLIC BLOOD PRESSURE: 104 MMHG | HEART RATE: 80 BPM | TEMPERATURE: 98 F

## 2023-10-18 DIAGNOSIS — R10.2 PELVIC PAIN: Primary | ICD-10-CM

## 2023-10-18 DIAGNOSIS — R82.81 PYURIA, STERILE: Primary | ICD-10-CM

## 2023-10-18 LAB
SL AMB  POCT GLUCOSE, UA: NEGATIVE
SL AMB LEUKOCYTE ESTERASE,UA: NORMAL
SL AMB POCT BILIRUBIN,UA: NEGATIVE
SL AMB POCT BLOOD,UA: NORMAL
SL AMB POCT CLARITY,UA: NORMAL
SL AMB POCT COLOR,UA: YELLOW
SL AMB POCT KETONES,UA: NEGATIVE
SL AMB POCT NITRITE,UA: NEGATIVE
SL AMB POCT PH,UA: 5
SL AMB POCT SPECIFIC GRAVITY,UA: 1.01
SL AMB POCT URINE PROTEIN: NEGATIVE
SL AMB POCT UROBILINOGEN: 0.2

## 2023-10-18 PROCEDURE — 87077 CULTURE AEROBIC IDENTIFY: CPT | Performed by: STUDENT IN AN ORGANIZED HEALTH CARE EDUCATION/TRAINING PROGRAM

## 2023-10-18 PROCEDURE — G0463 HOSPITAL OUTPT CLINIC VISIT: HCPCS | Performed by: STUDENT IN AN ORGANIZED HEALTH CARE EDUCATION/TRAINING PROGRAM

## 2023-10-18 PROCEDURE — 81002 URINALYSIS NONAUTO W/O SCOPE: CPT | Performed by: STUDENT IN AN ORGANIZED HEALTH CARE EDUCATION/TRAINING PROGRAM

## 2023-10-18 PROCEDURE — 87086 URINE CULTURE/COLONY COUNT: CPT | Performed by: STUDENT IN AN ORGANIZED HEALTH CARE EDUCATION/TRAINING PROGRAM

## 2023-10-18 PROCEDURE — 87186 SC STD MICRODIL/AGAR DIL: CPT | Performed by: STUDENT IN AN ORGANIZED HEALTH CARE EDUCATION/TRAINING PROGRAM

## 2023-10-18 PROCEDURE — 99213 OFFICE O/P EST LOW 20 MIN: CPT | Performed by: STUDENT IN AN ORGANIZED HEALTH CARE EDUCATION/TRAINING PROGRAM

## 2023-10-18 NOTE — TELEPHONE ENCOUNTER
Seen by urgent care with urinary symptoms. Would like a referral to urology to follow up. Has white appearance in urine per patient.

## 2023-10-18 NOTE — PROGRESS NOTES
[facility]        NAME: Lisa Hopper is a 76 y.o. female  : 1955    MRN: [id number]    Assessment and Plan   Pelvic pain [R10.2]  1. Pelvic pain  POCT urine dip          Results for orders placed or performed in visit on 10/18/23   POCT urine dip   Result Value Ref Range    LEUKOCYTE ESTERASE,UA moderate     NITRITE,UA negative     SL AMB POCT UROBILINOGEN 0.2     POCT URINE PROTEIN negative      PH,UA 5     BLOOD,UA moderate     SPECIFIC GRAVITY,UA 1.010     KETONES,UA negative     BILIRUBIN,UA negative     GLUCOSE, UA negative      COLOR,UA yellow     CLARITY,UA hazy      Urinalysis with mod blood and mod leukocytes, no nitrites. Will confirm UTI with culture prior to starting antibiotics. I had an extensive discussed with pt - I explained that recent uti was cleared with antibiotics as noted by negative culture yesterday so it is unlikely that another infection has occurred immediately. Rather than repeating another course of antibiotics, which is not without side effects and keeping in mind antibiotic resistance, it would be prudent to confirm uti before giving another course of antibiotics. Pt is agreeable with this plan. I also urged pt to get urology referral from PCP as I agree with their recommendation due to the sterile pyuria noted on sample collected 2 days ago. Also if there is an infection with today's urine sample, that is another good reason for a urology consult due to recurrent UTI in a short span of time. Also recommend taking OTC tylenol, motrin, pyridium for pain management. Patient Instructions       Follow up with PCP in 3-5 days. Proceed to  ER if symptoms worsen. Chief Complaint     Chief Complaint   Patient presents with    Pelvic Pain     Was just here for UTI. Stateshe was just here for a urine sample and PCP states it was clear. But is still having pelvic pain and burning.          History of Present Illness       HPI    Pt is presenting with pelvic pain and dysuria. She was seen in urgent care on 10/4 with similar symptoms which was confirmed as UTI by culture and was treated with keflex. Pt reports improvement in symptoms initially with antibiotics and completed the course. Reports recurrence of symptoms a few days after antibiotics similar to prior episode. Reports pelvic pressure and pain, burning with urination. Denies fever, reports chills, vaginal discharge/odor/itching. Had urine testing done 2 days ago by PCP which showed sterile pyuria as culture was negative and they had recommended urology consult but pt declined at this time as she has some chronic back pain issues and she wanted to ensure this the back is not the culprit for the presenting urinary symptoms. Review of Systems   Review of Systems   Constitutional:  Negative for chills and fever. HENT:  Negative for ear pain and sore throat. Eyes:  Negative for pain and visual disturbance. Respiratory:  Negative for cough, chest tightness and shortness of breath. Cardiovascular:  Negative for chest pain and palpitations. Gastrointestinal:  Negative for abdominal pain, constipation, diarrhea, nausea and vomiting. Genitourinary:  Positive for dysuria and pelvic pain. Negative for frequency, hematuria, menstrual problem and urgency. Musculoskeletal:  Negative for arthralgias and back pain. Skin:  Negative for color change and rash. Neurological:  Negative for seizures and syncope. Psychiatric/Behavioral:  Negative for dysphoric mood and suicidal ideas. All other systems reviewed and are negative.       Current Medications       Current Outpatient Medications:     acetaminophen (TYLENOL) 325 mg tablet, Take 2 tablets (650 mg total) by mouth every 6 (six) hours as needed for mild pain, moderate pain, headaches or fever, Disp: , Rfl: 0    Ascorbic Acid (VITAMIN C PO), Take 500 mg by mouth daily  , Disp: , Rfl:     cholecalciferol (VITAMIN D3) 1,000 units tablet, Take 5,000 Units by mouth daily  , Disp: , Rfl:     Diclofenac Sodium (VOLTAREN) 1 %, Apply 2 g topically 4 (four) times a day, Disp: 2 g, Rfl: 0    guaiFENesin (MUCINEX) 600 mg 12 hr tablet, Take 2 tablets (1,200 mg total) by mouth every 12 (twelve) hours, Disp: 30 tablet, Rfl: 0    ondansetron (ZOFRAN-ODT) 4 mg disintegrating tablet, Take 1 tablet (4 mg total) by mouth every 6 (six) hours as needed for nausea or vomiting, Disp: 20 tablet, Rfl: 0    pantoprazole (PROTONIX) 40 mg tablet, Take 1 tablet (40 mg total) by mouth daily, Disp: 90 tablet, Rfl: 0    Probiotic Product (PROBIOTIC PO), Take by mouth daily, Disp: , Rfl:     Current Allergies     Allergies as of 10/18/2023 - Reviewed 10/18/2023   Allergen Reaction Noted    Tramadol Vomiting 04/28/2016    Ciprofloxacin Other (See Comments) 04/28/2016    Lisinopril  12/09/2017    Prednisone GI Intolerance 02/29/2016            The following portions of the patient's history were reviewed and updated as appropriate: allergies, current medications, past family history, past medical history, past social history, past surgical history and problem list.     Past Medical History:   Diagnosis Date    Bell's palsy     Colon polyp     Hypertension     Kidney calculi     Spinal stenosis        Past Surgical History:   Procedure Laterality Date    CHOLECYSTECTOMY      COLONOSCOPY      ENDOMETRIAL ABLATION      LAMINECTOMY  06/14/2019    Lumbar spine - With placement of Trial device nonfusion device       Family History   Problem Relation Age of Onset    Dementia Mother     Heart disease Mother     Emphysema Father     No Known Problems Daughter     No Known Problems Maternal Grandmother     No Known Problems Maternal Grandfather     No Known Problems Paternal Grandmother     No Known Problems Paternal Grandfather     Colon cancer Maternal Uncle     No Known Problems Maternal Uncle     No Known Problems Maternal Uncle     No Known Problems Maternal Uncle     No Known Problems Maternal Uncle No Known Problems Son     No Known Problems Brother          Medications have been verified. Objective   BP (!) 142/104   Pulse 80   Temp 98 °F (36.7 °C)   Resp 16   Wt 71.7 kg (158 lb 1.1 oz)   SpO2 98%   BMI 28.91 kg/m²        Physical Exam     Physical Exam  Constitutional:       General: She is not in acute distress. Appearance: Normal appearance. HENT:      Head: Normocephalic and atraumatic. Eyes:      Extraocular Movements: Extraocular movements intact. Conjunctiva/sclera: Conjunctivae normal.   Cardiovascular:      Rate and Rhythm: Normal rate. Pulmonary:      Effort: Pulmonary effort is normal. No respiratory distress. Abdominal:      Tenderness: There is abdominal tenderness in the suprapubic area. There is no right CVA tenderness, left CVA tenderness, guarding or rebound. Skin:     General: Skin is warm and dry. Neurological:      General: No focal deficit present. Mental Status: She is alert and oriented to person, place, and time.    Psychiatric:         Mood and Affect: Mood normal.         Behavior: Behavior normal.

## 2023-10-20 LAB — BACTERIA UR CULT: ABNORMAL

## 2023-12-05 ENCOUNTER — HOSPITAL ENCOUNTER (OUTPATIENT)
Dept: ULTRASOUND IMAGING | Facility: HOSPITAL | Age: 68
Discharge: HOME | End: 2023-12-05
Attending: INTERNAL MEDICINE
Payer: MEDICARE

## 2023-12-05 DIAGNOSIS — N28.1 RENAL CYST: ICD-10-CM

## 2023-12-05 PROCEDURE — 76775 US EXAM ABDO BACK WALL LIM: CPT

## 2024-02-08 ENCOUNTER — EVALUATION (OUTPATIENT)
Dept: PHYSICAL THERAPY | Facility: CLINIC | Age: 69
End: 2024-02-08
Payer: MEDICARE

## 2024-02-08 DIAGNOSIS — M54.17 RADICULOPATHY, LUMBOSACRAL REGION: Primary | ICD-10-CM

## 2024-02-08 PROCEDURE — 97110 THERAPEUTIC EXERCISES: CPT

## 2024-02-08 PROCEDURE — 97161 PT EVAL LOW COMPLEX 20 MIN: CPT

## 2024-02-08 NOTE — LETTER
2024    Nicola Smith DO  [address] [address]    Patient: Chinmay Curtis   YOB: 1955   Date of Visit: 2024     Encounter Diagnosis     ICD-10-CM    1. Radiculopathy, lumbosacral region  M54.17           Dear Dr. Smith:    Thank you for your recent referral of Chinmay Curtis. Please review the attached evaluation summary from Chinmay's recent visit.     Please verify that you agree with the plan of care by signing the attached order.     If you have any questions or concerns, please do not hesitate to call.     I sincerely appreciate the opportunity to share in the care of one of your patients and hope to have another opportunity to work with you in the near future.       Sincerely,    Darell Bryant, PT      Referring Provider:      I certify that I have read the below Plan of Care and certify the need for these services furnished under this plan of treatment while under my care.                    Nicloa Smith DO  [address] [address]  Via Fax: [phone]          PT Evaluation     Today's date: 2024  Patient name: Chinmay Curtis  : 1955  MRN: [id number]  Referring provider: Nicola Smith DO  Dx:   Encounter Diagnosis     ICD-10-CM    1. Radiculopathy, lumbosacral region  M54.17           Start Time: 0800  Stop Time: 0855  Total time in clinic (min): 55 minutes    Assessment  Assessment details: The patient is a 68 y.o. female presenting to Physical Therapy today with lumbar spine pain with radicular symptoms into bilateral lower extremities. Upon completion of the initial evaluation the patient is demonstrating with limitations in core conditioning, thoracolumbar mobility, scapulothoracic control, bilateral hip mobility, bilateral hip strength, bilateral knee strength, and pain. She is currently having difficulty with performance of functional activities such as sleeping, lifting, sitting, and stair navigation  due to symptomatology. Patient would benefit from a course of skilled Physical Therapy services to address functional limitations to return to prior level of function. Thank you for your referral.   Impairments: abnormal muscle firing, abnormal muscle tone, abnormal or restricted ROM, abnormal movement, activity intolerance, impaired physical strength, lacks appropriate home exercise program, pain with function and poor posture     Symptom irritability: moderateUnderstanding of Dx/Px/POC: good   Prognosis: good    Goals  ST.) Patient will initiate HEP Independently   2.) Patient will have a 50% reduction in overall symptoms   3.) Patient will demonstrate improved strength evidenced by a 1-2 MMT grade increase  4.) Patient will demonstrate improved thoracolumbar mobility evidenced by </= minimal restriction in all planes of motion  5.) Patient will demonstrate improved ability to ambulate >/= 1 mile w/o symptoms    LT.) Patient will be d/c to an HEP independently  2.) Patient will improve functional abilities evidenced by FOTO scores  3.) Eliminate pain with functional activity   4.) Patient will demonstrate improved ability to lift, push, pull, and carry safely w/o symptoms   5.) Return to PLOF     Plan  Plan details: Plan of care was reviewed and discussed thoroughly with the patient on their current condition. Patient was instructed on a HEP with written instructions. Patient is in agreement with PT recommendations and will attend Physical Therapy 2x/week for the next 8 weeks to address current deficits.    Patient would benefit from: PT eval and skilled physical therapy  Referral necessary: No  Planned modality interventions: cryotherapy and thermotherapy: hydrocollator packs  Planned therapy interventions: abdominal trunk stabilization, flexibility, functional ROM exercises, graded activity, graded exercise, graded motor, home exercise program, manual therapy, joint mobilization, neuromuscular  re-education, patient education, postural training, strengthening, stretching, therapeutic activities, therapeutic exercise and therapeutic training  Frequency: 2x week  Duration in weeks: 8  Plan of Care beginning date: 2024  Plan of Care expiration date: 2024  Treatment plan discussed with: patient      Subjective Evaluation    History of Present Illness  Mechanism of injury: The patient reports today with bilateral low back pain that started approximately 5 years ago. She notes having low back surgery 5 years ago at L4/L5 with an experimental device implanted. Patient notes more recently experiencing radiating symptoms into bilateral lower extremities that is most prominent when she is laying down. She reports currently having difficulty with performance of functional activities such as stair navigation, sleeping, sitting, and lifting. She states having a consultation with neurology and is now referred to OPPT.          Recurrent probem    Quality of life: good    Patient Goals  Patient goals for therapy: decreased pain, increased motion, increased strength, independence with ADLs/IADLs and return to sport/leisure activities    Pain  Current pain ratin  At best pain ratin  At worst pain ratin  Location: bilateral low back  Quality: dull ache (numbness and tingling)  Aggravating factors: sitting, lifting and stair climbing (laying down, pushing, pulling)      Diagnostic Tests  MRI studies: abnormal  Treatments  Current treatment: physical therapy      Objective     Concurrent Complaints  Positive for night pain and disturbed sleep. Negative for bladder dysfunction, bowel dysfunction and saddle (S4) numbness    Postural Observations  Seated posture: poor  Standing posture: poor    Additional Postural Observation Details  Patient presents with forward head posture, increased thoracic kyphosis, and rounded shoulder posture.    Palpation   Left   Hypertonic in the erector spinae, lumbar paraspinals  and quadratus lumborum.     Right   Hypertonic in the erector spinae, lumbar paraspinals and quadratus lumborum.     Tenderness     Lumbar Spine  No tenderness in the spinous process, left transverse process and right transverse process.     Right Hip   Tenderness in the PSIS.     Neurological Testing     Sensation     Lumbar   Left   Intact: light touch    Right   Intact: light touch    Reflexes   Left   Patellar (L4): normal (2+)  Achilles (S1): normal (2+)    Right   Patellar (L4): normal (2+)  Achilles (S1): normal (2+)    Active Range of Motion   Cervical/Thoracic Spine       Thoracic    Flexion:  with pain Restriction level: maximal  Extension:  Restriction level: moderate  Left lateral flexion:  with pain Restriction level: maximal  Right lateral flexion:  with pain Restriction level: maximal  Left rotation:  Restriction level: maximal  Right rotation:  with pain Restriction level: maximal    Lumbar   Flexion:  with pain Restriction level: maximal  Extension:  Restriction level: moderate  Left lateral flexion:  with pain Restriction level: maximal  Right lateral flexion:  with pain Restriction level: maximal  Left rotation:  with pain Restriction level: maximal  Right rotation:  with pain Restriction level: maximal    Additional Active Range of Motion Details  - peripheralization of symptoms with lumbar flexion  - centralization of symptoms with lumbar extension    Strength/Myotome Testing     Left Hip   Planes of Motion   Flexion: 3+  Extension: 3+  Abduction: 3  Adduction: 4-  External rotation: 4-  Internal rotation: 4-    Right Hip   Planes of Motion   Flexion: 3+  Extension: 3+  Abduction: 3  Adduction: 4-  External rotation: 3+  Internal rotation: 3+    Left Knee   Flexion: 4-  Extension: 4-    Right Knee   Flexion: 3+  Extension: 3+    Left Ankle/Foot   Dorsiflexion: WFL.   Plantar flexion: WFL.   Inversion: WFL.   Eversion: WFL.     Right Ankle/Foot   Dorsiflexion: 3+  Plantar flexion: WFL.  "  Inversion: WFL.   Eversion: WFL.     Muscle Activation     Additional Muscle Activation Details  Patient demonstrates with decreased transverse abdominal activation     Tests     Lumbar     Left   Positive passive SLR.   Negative crossed SLR.     Right   Positive passive SLR.   Negative crossed SLR.     Left Pelvic Girdle/Sacrum   Positive: active SLR test.     Right Pelvic Girdle/Sacrum   Positive: active SLR test.     Left Hip   Positive ALEJANDRO and piriformis.   Negative FADIR.   SLR: Positive.     Right Hip   Positive ALEJANDRO and piriformis.   Negative FADIR.   SLR: Positive.       Flowsheet Rows      Flowsheet Row Most Recent Value   PT/OT G-Codes    Current Score 54   Projected Score 67               Precautions: L4/L5 anterolisthesis w experimental device implanted (2019)      Manuals 2/8            Stretching: Prone B Hip IR/ER/Hip Flx             STM              P/A JM                          Neuro Re-Ed             TA Draw-in              TA Draw-in w/ PB Press Downs             TA Draw-in w/ CC Anti Rot             TA Draw-in w/ CC ALEJANDRA                                                    Ther Ex             PPU 5\" Hold x10 on Elbows            Prone Hip ER 5\" Hold x10             Seated Hamstring Stretch  3x20\" Hold ea            Prone Hip IR             Prone Ball Squeeze             Prone Hip Ext w/ Knee Bent             Seated Thoracic Ext             Standing Thoracic Ext             Standing Hip Flexor Stretch             HEP Instruction BM            Ther Activity                                       Gait Training                                       Modalities             MHP  5'                                              "

## 2024-02-08 NOTE — PROGRESS NOTES
PT Evaluation     Today's date: 2024  Patient name: Chinmay Curtis  : 1955  MRN: [id number]  Referring provider: Nicola Smith DO  Dx:   Encounter Diagnosis     ICD-10-CM    1. Radiculopathy, lumbosacral region  M54.17           Start Time: 0800  Stop Time: 0855  Total time in clinic (min): 55 minutes    Assessment  Assessment details: The patient is a 68 y.o. female presenting to Physical Therapy today with lumbar spine pain with radicular symptoms into bilateral lower extremities. Upon completion of the initial evaluation the patient is demonstrating with limitations in core conditioning, thoracolumbar mobility, scapulothoracic control, bilateral hip mobility, bilateral hip strength, bilateral knee strength, and pain. She is currently having difficulty with performance of functional activities such as sleeping, lifting, sitting, and stair navigation due to symptomatology. Patient would benefit from a course of skilled Physical Therapy services to address functional limitations to return to prior level of function. Thank you for your referral.   Impairments: abnormal muscle firing, abnormal muscle tone, abnormal or restricted ROM, abnormal movement, activity intolerance, impaired physical strength, lacks appropriate home exercise program, pain with function and poor posture     Symptom irritability: moderateUnderstanding of Dx/Px/POC: good   Prognosis: good    Goals  ST.) Patient will initiate HEP Independently   2.) Patient will have a 50% reduction in overall symptoms   3.) Patient will demonstrate improved strength evidenced by a 1-2 MMT grade increase  4.) Patient will demonstrate improved thoracolumbar mobility evidenced by </= minimal restriction in all planes of motion  5.) Patient will demonstrate improved ability to ambulate >/= 1 mile w/o symptoms    LT.) Patient will be d/c to an HEP independently  2.) Patient will improve functional abilities evidenced by FOTO scores  3.) Eliminate  pain with functional activity   4.) Patient will demonstrate improved ability to lift, push, pull, and carry safely w/o symptoms   5.) Return to PLOF     Plan  Plan details: Plan of care was reviewed and discussed thoroughly with the patient on their current condition. Patient was instructed on a HEP with written instructions. Patient is in agreement with PT recommendations and will attend Physical Therapy 2x/week for the next 8 weeks to address current deficits.    Patient would benefit from: PT eval and skilled physical therapy  Referral necessary: No  Planned modality interventions: cryotherapy and thermotherapy: hydrocollator packs  Planned therapy interventions: abdominal trunk stabilization, flexibility, functional ROM exercises, graded activity, graded exercise, graded motor, home exercise program, manual therapy, joint mobilization, neuromuscular re-education, patient education, postural training, strengthening, stretching, therapeutic activities, therapeutic exercise and therapeutic training  Frequency: 2x week  Duration in weeks: 8  Plan of Care beginning date: 2/8/2024  Plan of Care expiration date: 4/4/2024  Treatment plan discussed with: patient      Subjective Evaluation    History of Present Illness  Mechanism of injury: The patient reports today with bilateral low back pain that started approximately 5 years ago. She notes having low back surgery 5 years ago at L4/L5 with an experimental device implanted. Patient notes more recently experiencing radiating symptoms into bilateral lower extremities that is most prominent when she is laying down. She reports currently having difficulty with performance of functional activities such as stair navigation, sleeping, sitting, and lifting. She states having a consultation with neurology and is now referred to OPPT.          Recurrent probem    Quality of life: good    Patient Goals  Patient goals for therapy: decreased pain, increased motion, increased  strength, independence with ADLs/IADLs and return to sport/leisure activities    Pain  Current pain ratin  At best pain ratin  At worst pain ratin  Location: bilateral low back  Quality: dull ache (numbness and tingling)  Aggravating factors: sitting, lifting and stair climbing (laying down, pushing, pulling)      Diagnostic Tests  MRI studies: abnormal  Treatments  Current treatment: physical therapy      Objective     Concurrent Complaints  Positive for night pain and disturbed sleep. Negative for bladder dysfunction, bowel dysfunction and saddle (S4) numbness    Postural Observations  Seated posture: poor  Standing posture: poor    Additional Postural Observation Details  Patient presents with forward head posture, increased thoracic kyphosis, and rounded shoulder posture.    Palpation   Left   Hypertonic in the erector spinae, lumbar paraspinals and quadratus lumborum.     Right   Hypertonic in the erector spinae, lumbar paraspinals and quadratus lumborum.     Tenderness     Lumbar Spine  No tenderness in the spinous process, left transverse process and right transverse process.     Right Hip   Tenderness in the PSIS.     Neurological Testing     Sensation     Lumbar   Left   Intact: light touch    Right   Intact: light touch    Reflexes   Left   Patellar (L4): normal (2+)  Achilles (S1): normal (2+)    Right   Patellar (L4): normal (2+)  Achilles (S1): normal (2+)    Active Range of Motion   Cervical/Thoracic Spine       Thoracic    Flexion:  with pain Restriction level: maximal  Extension:  Restriction level: moderate  Left lateral flexion:  with pain Restriction level: maximal  Right lateral flexion:  with pain Restriction level: maximal  Left rotation:  Restriction level: maximal  Right rotation:  with pain Restriction level: maximal    Lumbar   Flexion:  with pain Restriction level: maximal  Extension:  Restriction level: moderate  Left lateral flexion:  with pain Restriction level:  maximal  Right lateral flexion:  with pain Restriction level: maximal  Left rotation:  with pain Restriction level: maximal  Right rotation:  with pain Restriction level: maximal    Additional Active Range of Motion Details  - peripheralization of symptoms with lumbar flexion  - centralization of symptoms with lumbar extension    Strength/Myotome Testing     Left Hip   Planes of Motion   Flexion: 3+  Extension: 3+  Abduction: 3  Adduction: 4-  External rotation: 4-  Internal rotation: 4-    Right Hip   Planes of Motion   Flexion: 3+  Extension: 3+  Abduction: 3  Adduction: 4-  External rotation: 3+  Internal rotation: 3+    Left Knee   Flexion: 4-  Extension: 4-    Right Knee   Flexion: 3+  Extension: 3+    Left Ankle/Foot   Dorsiflexion: WFL.   Plantar flexion: WFL.   Inversion: WFL.   Eversion: WFL.     Right Ankle/Foot   Dorsiflexion: 3+  Plantar flexion: WFL.   Inversion: WFL.   Eversion: WFL.     Muscle Activation     Additional Muscle Activation Details  Patient demonstrates with decreased transverse abdominal activation     Tests     Lumbar     Left   Positive passive SLR.   Negative crossed SLR.     Right   Positive passive SLR.   Negative crossed SLR.     Left Pelvic Girdle/Sacrum   Positive: active SLR test.     Right Pelvic Girdle/Sacrum   Positive: active SLR test.     Left Hip   Positive ALEJANDRO and piriformis.   Negative FADIR.   SLR: Positive.     Right Hip   Positive ALEJANDRO and piriformis.   Negative FADIR.   SLR: Positive.       Flowsheet Rows      Flowsheet Row Most Recent Value   PT/OT G-Codes    Current Score 54   Projected Score 67               Precautions: L4/L5 anterolisthesis w experimental device implanted (2019)      Manuals 2/8            Stretching: Prone B Hip IR/ER/Hip Flx             STM              P/A JM                          Neuro Re-Ed             TA Draw-in              TA Draw-in w/ PB Press Downs             TA Draw-in w/ CC Anti Rot             TA Draw-in w/ CC ALEJANDRA            "                                         Ther Ex             PPU 5\" Hold x10 on Elbows            Prone Hip ER 5\" Hold x10             Seated Hamstring Stretch  3x20\" Hold ea            Prone Hip IR             Prone Ball Squeeze             Prone Hip Ext w/ Knee Bent             Seated Thoracic Ext             Standing Thoracic Ext             Standing Hip Flexor Stretch             HEP Instruction BM            Ther Activity                                       Gait Training                                       Modalities             MHP  5'                              "

## 2024-02-12 ENCOUNTER — OFFICE VISIT (OUTPATIENT)
Dept: PHYSICAL THERAPY | Facility: CLINIC | Age: 69
End: 2024-02-12
Payer: MEDICARE

## 2024-02-12 DIAGNOSIS — M54.17 RADICULOPATHY, LUMBOSACRAL REGION: Primary | ICD-10-CM

## 2024-02-12 PROCEDURE — 97140 MANUAL THERAPY 1/> REGIONS: CPT

## 2024-02-12 PROCEDURE — 97110 THERAPEUTIC EXERCISES: CPT

## 2024-02-12 NOTE — PROGRESS NOTES
"Daily Note     Today's date: 2024  Patient name: Chinmay Curtis  : 1955  MRN: [id number]  Referring provider: Nicola Smith DO  Dx:   Encounter Diagnosis     ICD-10-CM    1. Radiculopathy, lumbosacral region  M54.17           Start Time: 0930  Stop Time: 1015  Total time in clinic (min): 45 minutes    Subjective: Patient reports receiving a massage over the weekend and notes feeling soreness today. She states numbness and tingling in both legs improved slightly with performance of home exercise program.      Objective: See treatment diary below      Assessment: Tolerated treatment well. We progressed the current program with the addition of bilateral hip strengthening and thoracolumbar mobility exercise. Patient tolerated progressions well with a mild increase in discomfort in the lumbar spine. We will continue to progress patient within tolerance to address functional limitations and symptomatology. Patient would benefit from continued PT.      Plan: Continue per plan of care.      Precautions: L4/L5 anterolisthesis w experimental device implanted ()      Manuals            Stretching: Prone B Hip IR/ER/Hip Flx  BM           STM              P/A JM                          Neuro Re-Ed             TA Draw-in              TA Draw-in w/ PB Press Downs             TA Draw-in w/ CC Anti Rot             TA Draw-in w/ CC ALEJANDRA                                                    Ther Ex             PPU 5\" Hold x10 on Elbows 5\" Hold 2x10 on Elbows            Prone Hip ER 5\" Hold x10  L2 x10 ea           Seated Hamstring Stretch  3x20\" Hold ea 3x20\" Hold ea            Prone Hip IR  L2 x10 ea           Prone Ball Squeeze             Prone Hip Ext w/ Knee Bent  X10 ea           Seated Thoracic Ext  5\" Hold x10 w 1/2 foam            Standing Thoracic Ext             Standing Hip Flexor Stretch             HEP Instruction BM            Ther Activity                                       Gait Training        "                                Modalities             MHP  5' 10' Seated Pre-exercise

## 2024-02-15 ENCOUNTER — OFFICE VISIT (OUTPATIENT)
Dept: PHYSICAL THERAPY | Facility: CLINIC | Age: 69
End: 2024-02-15
Payer: MEDICARE

## 2024-02-15 DIAGNOSIS — M54.17 RADICULOPATHY, LUMBOSACRAL REGION: Primary | ICD-10-CM

## 2024-02-15 PROCEDURE — 97110 THERAPEUTIC EXERCISES: CPT

## 2024-02-15 PROCEDURE — 97140 MANUAL THERAPY 1/> REGIONS: CPT

## 2024-02-15 NOTE — PROGRESS NOTES
"Daily Note     Today's date: 2/15/2024  Patient name: Chinmay Curtis  : 1955  MRN: [id number]  Referring provider: Nicola Smith DO  Dx:   Encounter Diagnosis     ICD-10-CM    1. Radiculopathy, lumbosacral region  M54.17           Start Time: 1100  Stop Time: 1150  Total time in clinic (min): 50 minutes    Subjective: Patient reports today with low back soreness. She states not noticing much difference to her symptoms yet and has been really active the past two weeks.       Objective: See treatment diary below      Assessment: Tolerated treatment well. We progressed the current program with the addition of proximal hip strengthening. Patient tolerated progressions well with a minimal increase in symptomatology in the lumbar spine. Patient would benefit from continued PT to address symptoms and functional limitations.      Plan: Continue per plan of care.      Precautions: L4/L5 anterolisthesis w experimental device implanted (2019)      Manuals 2/8 2/12 2/15          Stretching: Prone B Hip IR/ER/Hip Flx  BM BM          STM              P/A JM                          Neuro Re-Ed             TA Draw-in              TA Draw-in w/ PB Press Downs             TA Draw-in w/ CC Anti Rot             TA Draw-in w/ CC ALEJANDRA                                                    Ther Ex             PPU 5\" Hold x10 on Elbows 5\" Hold 2x10 on Elbows  5\" Hold 2x10 on Elbows          Prone Hip ER 5\" Hold x10  L2 x10 ea L2 x10 ea          Seated Hamstring Stretch  3x20\" Hold ea 3x20\" Hold ea  3x20\" Hold           Prone Hip IR  L2 x10 ea L2 x10 ea          Prone Ball Squeeze   5\" Hold x10           Prone Hip Ext w/ Knee Bent  X10 ea X10 ea          Seated Thoracic Ext  5\" Hold x10 w 1/2 foam  5\" Hold x10 w 1/2 foam           Standing Thoracic Ext             Standing Hip Flexor Stretch             HEP Instruction BM            Ther Activity                                       Gait Training                                     "   Modalities             MHP  5' 10' Seated Pre-exercise  10' Seated Pre-exercise

## 2024-02-16 ENCOUNTER — APPOINTMENT (OUTPATIENT)
Dept: PHYSICAL THERAPY | Facility: CLINIC | Age: 69
End: 2024-02-16
Payer: MEDICARE

## 2024-02-20 ENCOUNTER — OFFICE VISIT (OUTPATIENT)
Dept: PHYSICAL THERAPY | Facility: CLINIC | Age: 69
End: 2024-02-20
Payer: MEDICARE

## 2024-02-20 DIAGNOSIS — M54.17 RADICULOPATHY, LUMBOSACRAL REGION: Primary | ICD-10-CM

## 2024-02-20 PROCEDURE — 97140 MANUAL THERAPY 1/> REGIONS: CPT

## 2024-02-20 PROCEDURE — 97110 THERAPEUTIC EXERCISES: CPT

## 2024-02-20 NOTE — PROGRESS NOTES
"Daily Note     Today's date: 2024  Patient name: Chinmay Curtis  : 1955  MRN: [id number]  Referring provider: Nicloa Smith DO  Dx:   Encounter Diagnosis     ICD-10-CM    1. Radiculopathy, lumbosacral region  M54.17           Start Time: 0945  Stop Time: 1040  Total time in clinic (min): 55 minutes    Subjective: Patient reports leg pain is not as bad and is getting better. She notes still having low back pain. She states numbness and tingling into both legs when she is laying in bed at night.      Objective: See treatment diary below      Assessment: Tolerated treatment well. We progressed manual therapy this morning focusing on improving bilateral hip mobility to support the lumbar spine. Patient responded well to manual therapy with a reduction in bilateral hip and lumbar spine symptoms. We will continue progressing patient within tolerance to address functional deficits. Patient would benefit from continued PT.      Plan: Continue per plan of care.      Precautions: L4/L5 anterolisthesis w experimental device implanted ()      Manuals 2/8 2/12 2/15 2/20         Stretching: Prone B Hip IR/ER/Hip Flx/Hams  BM BM BM IR/ER Prone, Hip Flx/Hams Supine         STM              P/A JM                          Neuro Re-Ed             TA Draw-in              TA Draw-in w/ PB Press Downs             TA Draw-in w/ CC Anti Rot             TA Draw-in w/ CC ALEJANDRA                                                    Ther Ex             PPU 5\" Hold x10 on Elbows 5\" Hold 2x10 on Elbows  5\" Hold 2x10 on Elbows 5\" Hold 2x10 on Elbows          Prone Hip ER 5\" Hold x10  L2 x10 ea L2 x10 ea L2 x10 ea         Seated Hamstring Stretch  3x20\" Hold ea 3x20\" Hold ea  3x20\" Hold  3x20\" Hold          Prone Hip IR  L2 x10 ea L2 x10 ea L2 x10 ea         Prone Ball Squeeze   5\" Hold x10  5\" Hold x10          Prone Hip Ext w/ Knee Bent  X10 ea X10 ea X10 ea         Seated Thoracic Ext  5\" Hold x10 w 1/2 foam  5\" Hold x10 w 1/2 foam  " "5\" Hold x10 w 1/2 foam          Standing Thoracic Ext             Standing Hip Flexor Stretch    5\" Hold x10          HEP Instruction BM            Ther Activity                                       Gait Training                                       Modalities             MHP  5' 10' Seated Pre-exercise  10' Seated Pre-exercise  10' Seated Pre-exercise                                "

## 2024-02-21 ENCOUNTER — OFFICE VISIT (OUTPATIENT)
Dept: PHYSICAL THERAPY | Facility: CLINIC | Age: 69
End: 2024-02-21
Payer: MEDICARE

## 2024-02-21 DIAGNOSIS — M54.17 RADICULOPATHY, LUMBOSACRAL REGION: Primary | ICD-10-CM

## 2024-02-21 PROCEDURE — 97140 MANUAL THERAPY 1/> REGIONS: CPT

## 2024-02-21 PROCEDURE — 97110 THERAPEUTIC EXERCISES: CPT

## 2024-02-21 NOTE — PROGRESS NOTES
"Daily Note     Today's date: 2024  Patient name: Chinmay Curtis  : 1955  MRN: [id number]  Referring provider: Nicola Smith DO  Dx:   Encounter Diagnosis     ICD-10-CM    1. Radiculopathy, lumbosacral region  M54.17           Start Time: 1530  Stop Time: 1625  Total time in clinic (min): 55 minutes    Subjective: Patient reports low back and bilateral leg pain was not as bad when laying down at night after last treatment session.       Objective: See treatment diary below      Assessment: Tolerated treatment well. Patient is responding well to Physical Therapy treatment this far and is making appropriate progressions with the current directed therapeutic exercise program. We will continue to progress patient within tolerance. She would benefit from continued PT.      Plan: Continue per plan of care.      Precautions: L4/L5 anterolisthesis w experimental device implanted ()      Manuals 2/8 2/12 2/15 2/20 2/21        Stretching: Prone B Hip IR/ER/Hip Flx/Hams  BM BM BM IR/ER Prone, Hip Flx/Hams Supine BM IR/ER Prone, Hip Flx/Hams Supine        STM              P/A JM                          Neuro Re-Ed             TA Draw-in              TA Draw-in w/ PB Press Downs             TA Draw-in w/ CC Anti Rot             TA Draw-in w/ CC ALEJANDRA                                                    Ther Ex             PPU 5\" Hold x10 on Elbows 5\" Hold 2x10 on Elbows  5\" Hold 2x10 on Elbows 5\" Hold 2x10 on Elbows  5\" Hold 2x10 on Elbows        Prone Hip ER 5\" Hold x10  L2 x10 ea L2 x10 ea L2 x10 ea L3 x10 ea        Seated Hamstring Stretch  3x20\" Hold ea 3x20\" Hold ea  3x20\" Hold  3x20\" Hold  Standing 3x20\" Hold 10\" step         Prone Hip IR  L2 x10 ea L2 x10 ea L2 x10 ea L3 x10 ea        Prone Ball Squeeze   5\" Hold x10  5\" Hold x10  5\" Hold x10         Prone Hip Ext w/ Knee Bent  X10 ea X10 ea X10 ea X10 ea        Seated Thoracic Ext  5\" Hold x10 w 1/2 foam  5\" Hold x10 w 1/2 foam  5\" Hold x10 w 1/2 foam  5\" " "Hold x10 w 1/2 foam         Standing Thoracic Ext             Standing Hip Flexor Stretch    5\" Hold x10  5\" Hold x10 8\" step         HEP Instruction BM            Ther Activity                                       Gait Training                                       Modalities             MHP  5' 10' Seated Pre-exercise  10' Seated Pre-exercise  10' Seated Pre-exercise  10' Seated Pre-exercise                                 "

## 2024-02-22 ENCOUNTER — APPOINTMENT (OUTPATIENT)
Dept: PHYSICAL THERAPY | Facility: CLINIC | Age: 69
End: 2024-02-22
Payer: MEDICARE

## 2024-02-23 ENCOUNTER — APPOINTMENT (OUTPATIENT)
Dept: PHYSICAL THERAPY | Facility: CLINIC | Age: 69
End: 2024-02-23
Payer: MEDICARE

## 2024-02-26 ENCOUNTER — OFFICE VISIT (OUTPATIENT)
Dept: PHYSICAL THERAPY | Facility: CLINIC | Age: 69
End: 2024-02-26
Payer: MEDICARE

## 2024-02-26 DIAGNOSIS — M54.17 RADICULOPATHY, LUMBOSACRAL REGION: Primary | ICD-10-CM

## 2024-02-26 PROCEDURE — 97110 THERAPEUTIC EXERCISES: CPT

## 2024-02-26 PROCEDURE — 97140 MANUAL THERAPY 1/> REGIONS: CPT

## 2024-02-26 NOTE — PROGRESS NOTES
"Daily Note     Today's date: 2024  Patient name: Chinmay Curtis  : 1955  MRN: [id number]  Referring provider: Nicola Smith DO  Dx:   Encounter Diagnosis     ICD-10-CM    1. Radiculopathy, lumbosacral region  M54.17           Start Time: 925  Stop Time: 1015  Total time in clinic (min): 50 minutes    Subjective: Patient reports muscle cramping in the R leg yesterday. She notes numbness and tingling has decreased in both legs. She notes some low back stiffness this morning but overall is progressing.       Objective: See treatment diary below      Assessment: Tolerated treatment well. Patient is continuing to respond well to the current directed therapeutic exercise program and manual therapy with a reduction in lumbar spine symptomatology. We will continue to progress patient within tolerance to address functional limitations. She would benefit from continued PT.      Plan: Continue per plan of care.      Precautions: L4/L5 anterolisthesis w experimental device implanted ()      Manuals 2/8 2/12 2/15 2/20 2/21 2/26       Stretching: Prone B Hip IR/ER/Hip Flx/Hams  BM BM BM IR/ER Prone, Hip Flx/Hams Supine BM IR/ER Prone, Hip Flx/Hams Supine BM IR/ER Prone, Hip Flx/Hams Supine        STM              P/A JM                          Neuro Re-Ed             TA Draw-in              TA Draw-in w/ PB Press Downs             TA Draw-in w/ CC Anti Rot             TA Draw-in w/ CC ALEJANDRA                                                    Ther Ex             PPU 5\" Hold x10 on Elbows 5\" Hold 2x10 on Elbows  5\" Hold 2x10 on Elbows 5\" Hold 2x10 on Elbows  5\" Hold 2x10 on Elbows 5\" Hold 2x10 on Elbows        Prone Hip ER 5\" Hold x10  L2 x10 ea L2 x10 ea L2 x10 ea L3 x10 ea L3 x10 ea       Hamstring Stretch  3x20\" Hold ea 3x20\" Hold ea  3x20\" Hold  3x20\" Hold  Standing 3x20\" Hold 10\" step  Standing 3x20\" Hold 10\" Step        Prone Hip IR  L2 x10 ea L2 x10 ea L2 x10 ea L3 x10 ea L3 x10 ea       Prone Ball Squeeze   " "5\" Hold x10  5\" Hold x10  5\" Hold x10  5\" Hold x10        Prone Hip Ext w/ Knee Bent  X10 ea X10 ea X10 ea X10 ea X10 ea       Seated Thoracic Ext  5\" Hold x10 w 1/2 foam  5\" Hold x10 w 1/2 foam  5\" Hold x10 w 1/2 foam  5\" Hold x10 w 1/2 foam  5\" Hold x10 w 1/2 foam        Standing Thoracic Ext             Standing Hip Flexor Stretch    5\" Hold x10  5\" Hold x10  5\" Hold x10       HEP Instruction BM            Ther Activity                                       Gait Training                                       Modalities             MHP  5' 10' Seated Pre-exercise  10' Seated Pre-exercise  10' Seated Pre-exercise  10' Seated Pre-exercise  10' Seated Pre-exercise                                  "

## 2024-02-28 ENCOUNTER — OFFICE VISIT (OUTPATIENT)
Dept: PHYSICAL THERAPY | Facility: CLINIC | Age: 69
End: 2024-02-28
Payer: MEDICARE

## 2024-02-28 DIAGNOSIS — M54.17 RADICULOPATHY, LUMBOSACRAL REGION: Primary | ICD-10-CM

## 2024-02-28 PROCEDURE — 97110 THERAPEUTIC EXERCISES: CPT

## 2024-02-28 PROCEDURE — 97140 MANUAL THERAPY 1/> REGIONS: CPT

## 2024-02-28 NOTE — PROGRESS NOTES
"Daily Note     Today's date: 2024  Patient name: Chinmay Curtis  : 1955  MRN: [id number]  Referring provider: Nicola Smith DO  Dx:   Encounter Diagnosis     ICD-10-CM    1. Radiculopathy, lumbosacral region  M54.17           Start Time: 09  Stop Time: 1025  Total time in clinic (min): 55 minutes    Subjective: Patient reports a reduction in numbness and tingling into b/l legs when laying in bed at night as well as decreased occurrence of muscle cramping in b/l posterior thighs. She notes still having low back stiffness.      Objective: See treatment diary below      Assessment: Tolerated treatment well. Patient continues to respond well to the current directed therapeutic exercise program and manual therapy with a reduction in symptomatology in the lumbar spine. We will continue to progress patient within tolerance to meet functional goals. She would benefit from continued PT.      Plan: Continue per plan of care.      Precautions: L4/L5 anterolisthesis w experimental device implanted ()      Manuals 2/8 2/12 2/15 2/20 2/21 2/26 2/28      Stretching: Prone B Hip IR/ER/Hip Flx/Hams  BM BM BM IR/ER Prone, Hip Flx/Hams Supine BM IR/ER Prone, Hip Flx/Hams Supine BM IR/ER Prone, Hip Flx/Hams Supine  BM IR/ER Prone, Hip Flx/Hams Supine      STM              P/A JM                          Neuro Re-Ed             TA Draw-in              TA Draw-in w/ PB Press Downs             TA Draw-in w/ CC Anti Rot             TA Draw-in w/ CC ALEJANDRA                                                    Ther Ex             PPU 5\" Hold x10 on Elbows 5\" Hold 2x10 on Elbows  5\" Hold 2x10 on Elbows 5\" Hold 2x10 on Elbows  5\" Hold 2x10 on Elbows 5\" Hold 2x10 on Elbows  5\" Hold 2x10 on Elbows      Prone Hip ER 5\" Hold x10  L2 x10 ea L2 x10 ea L2 x10 ea L3 x10 ea L3 x10 ea L3 x10ea      Hamstring Stretch  3x20\" Hold ea 3x20\" Hold ea  3x20\" Hold  3x20\" Hold  Standing 3x20\" Hold 10\" step  Standing 3x20\" Hold 10\" Step  Standing " "3x20\" ea Hold 10\" step       Prone Hip IR  L2 x10 ea L2 x10 ea L2 x10 ea L3 x10 ea L3 x10 ea L3 x10 ea      Prone Ball Squeeze   5\" Hold x10  5\" Hold x10  5\" Hold x10  5\" Hold x10  5\" Hold x10       Prone Hip Ext w/ Knee Bent  X10 ea X10 ea X10 ea X10 ea X10 ea X10 ea      Seated Thoracic Ext  5\" Hold x10 w 1/2 foam  5\" Hold x10 w 1/2 foam  5\" Hold x10 w 1/2 foam  5\" Hold x10 w 1/2 foam  5\" Hold x10 w 1/2 foam  5\" Hold x10 w 1/2 foam       Standing Thoracic Ext             Standing Hip Flexor Stretch    5\" Hold x10  5\" Hold x10  5\" Hold x10 3x20\" Hold ea       HEP Instruction BM            Ther Activity                                       Gait Training                                       Modalities             MHP  5' 10' Seated Pre-exercise  10' Seated Pre-exercise  10' Seated Pre-exercise  10' Seated Pre-exercise  10' Seated Pre-exercise  10' Seated Pre-exercise                                   "

## 2024-02-29 ENCOUNTER — APPOINTMENT (OUTPATIENT)
Dept: PHYSICAL THERAPY | Facility: CLINIC | Age: 69
End: 2024-02-29
Payer: MEDICARE

## 2024-03-01 ENCOUNTER — APPOINTMENT (OUTPATIENT)
Dept: PHYSICAL THERAPY | Facility: CLINIC | Age: 69
End: 2024-03-01
Payer: MEDICARE

## 2024-03-11 ENCOUNTER — APPOINTMENT (OUTPATIENT)
Dept: PHYSICAL THERAPY | Facility: CLINIC | Age: 69
End: 2024-03-11
Payer: MEDICARE

## 2024-03-11 NOTE — PROGRESS NOTES
"Daily Note     Today's date: 3/11/2024  Patient name: Chinmay Curtis  : 1955  MRN: [id number]  Referring provider: Virgil Palmer MD  Dx: No diagnosis found.               Subjective: ***      Objective: See treatment diary below      Assessment: Tolerated treatment {Tolerated treatment :[id number]}. Patient {assessment:[id number]}      Plan: Continue per plan of care.      Precautions: L4/L5 anterolisthesis w experimental device implanted ()      Manuals 2/8 2/12 2/15 2/20 2/21 2/26 2/28 3/11     Stretching: Prone B Hip IR/ER/Hip Flx/Hams  BM BM BM IR/ER Prone, Hip Flx/Hams Supine BM IR/ER Prone, Hip Flx/Hams Supine BM IR/ER Prone, Hip Flx/Hams Supine  BM IR/ER Prone, Hip Flx/Hams Supine      STM              P/A JM                          Neuro Re-Ed             TA Draw-in              TA Draw-in w/ PB Press Downs             TA Draw-in w/ CC Anti Rot             TA Draw-in w/ CC ALEJANDRA                                                    Ther Ex             PPU 5\" Hold x10 on Elbows 5\" Hold 2x10 on Elbows  5\" Hold 2x10 on Elbows 5\" Hold 2x10 on Elbows  5\" Hold 2x10 on Elbows 5\" Hold 2x10 on Elbows  5\" Hold 2x10 on Elbows      Prone Hip ER 5\" Hold x10  L2 x10 ea L2 x10 ea L2 x10 ea L3 x10 ea L3 x10 ea L3 x10ea      Hamstring Stretch  3x20\" Hold ea 3x20\" Hold ea  3x20\" Hold  3x20\" Hold  Standing 3x20\" Hold 10\" step  Standing 3x20\" Hold 10\" Step  Standing 3x20\" ea Hold 10\" step       Prone Hip IR  L2 x10 ea L2 x10 ea L2 x10 ea L3 x10 ea L3 x10 ea L3 x10 ea      Prone Ball Squeeze   5\" Hold x10  5\" Hold x10  5\" Hold x10  5\" Hold x10  5\" Hold x10       Prone Hip Ext w/ Knee Bent  X10 ea X10 ea X10 ea X10 ea X10 ea X10 ea      Seated Thoracic Ext  5\" Hold x10 w 1/2 foam  5\" Hold x10 w 1/2 foam  5\" Hold x10 w 1/2 foam  5\" Hold x10 w 1/2 foam  5\" Hold x10 w 1/2 foam  5\" Hold x10 w 1/2 foam       Standing Thoracic Ext             Standing Hip Flexor Stretch    5\" Hold x10  5\" Hold x10  5\" Hold x10 3x20\" Hold ea  "      HEP Instruction BM            Ther Activity                                       Gait Training                                       Modalities             MHP  5' 10' Seated Pre-exercise  10' Seated Pre-exercise  10' Seated Pre-exercise  10' Seated Pre-exercise  10' Seated Pre-exercise  10' Seated Pre-exercise

## 2024-03-13 ENCOUNTER — OFFICE VISIT (OUTPATIENT)
Dept: PHYSICAL THERAPY | Facility: CLINIC | Age: 69
End: 2024-03-13
Payer: MEDICARE

## 2024-03-13 DIAGNOSIS — M54.17 RADICULOPATHY, LUMBOSACRAL REGION: Primary | ICD-10-CM

## 2024-03-13 PROCEDURE — 97110 THERAPEUTIC EXERCISES: CPT

## 2024-03-13 PROCEDURE — 97140 MANUAL THERAPY 1/> REGIONS: CPT

## 2024-03-13 NOTE — PROGRESS NOTES
"Daily Note     Today's date: 3/13/2024  Patient name: Chinmay Curtis  : 1955  MRN: [id number]  Referring provider: Virgil Palmer MD  Dx:   Encounter Diagnosis     ICD-10-CM    1. Radiculopathy, lumbosacral region  M54.17           Start Time: 09  Stop Time: 1020  Total time in clinic (min): 50 minutes    Subjective: Patient reports a slight increase in low back soreness due to lifting boxes and doing a lot of stairs in her home. Overall,  she notes numbness and tingling has reduced as well as muscle cramps.      Objective: See treatment diary below      Assessment: Tolerated treatment well. Patient is continuing to make appropriate progressions with the current directed therapeutic exercise program with a notable reduction in symptomatology in the lumbar spine. She demonstrates centralization of peripheral symptoms into b/l lower extremities. We will continue to progress patient within tolerance. She would benefit from continued PT.      Plan: Continue per plan of care.      Precautions: L4/L5 anterolisthesis w experimental device implanted ()      Manuals 2/8 2/12 2/15 2/20 2/21 2/26 2/28 3/13     Stretching: Prone B Hip IR/ER/Hip Flx/Hams  BM BM BM IR/ER Prone, Hip Flx/Hams Supine BM IR/ER Prone, Hip Flx/Hams Supine BM IR/ER Prone, Hip Flx/Hams Supine  BM IR/ER Prone, Hip Flx/Hams Supine BM IR/ER Prone, Hams Supine     STM              P/A JM                          Neuro Re-Ed             TA Draw-in              TA Draw-in w/ PB Press Downs             TA Draw-in w/ CC Anti Rot             TA Draw-in w/ CC ALEJANDRA                                                    Ther Ex             PPU 5\" Hold x10 on Elbows 5\" Hold 2x10 on Elbows  5\" Hold 2x10 on Elbows 5\" Hold 2x10 on Elbows  5\" Hold 2x10 on Elbows 5\" Hold 2x10 on Elbows  5\" Hold 2x10 on Elbows 5\" Hold 2x10 on Elbows     Prone Hip ER 5\" Hold x10  L2 x10 ea L2 x10 ea L2 x10 ea L3 x10 ea L3 x10 ea L3 x10ea L3 x10 ea     Hamstring Stretch  3x20\" " "Hold ea 3x20\" Hold ea  3x20\" Hold  3x20\" Hold  Standing 3x20\" Hold 10\" step  Standing 3x20\" Hold 10\" Step  Standing 3x20\" ea Hold 10\" step  Standing 3x20\"Hold ea 10\" step      Prone Hip IR  L2 x10 ea L2 x10 ea L2 x10 ea L3 x10 ea L3 x10 ea L3 x10 ea L3 x10 ea     Prone Ball Squeeze   5\" Hold x10  5\" Hold x10  5\" Hold x10  5\" Hold x10  5\" Hold x10  5\" Hold x10      Prone Hip Ext w/ Knee Bent  X10 ea X10 ea X10 ea X10 ea X10 ea X10 ea X10 ea     Seated Thoracic Ext  5\" Hold x10 w 1/2 foam  5\" Hold x10 w 1/2 foam  5\" Hold x10 w 1/2 foam  5\" Hold x10 w 1/2 foam  5\" Hold x10 w 1/2 foam  5\" Hold x10 w 1/2 foam  5\" Hold x10 w 1/2 foam      Standing Thoracic Ext             Standing Hip Flexor Stretch    5\" Hold x10  5\" Hold x10  5\" Hold x10 3x20\" Hold ea  3x20\" Hold ea 10\" step      HEP Instruction BM            Ther Activity                                       Gait Training                                       Modalities             MHP  5' 10' Seated Pre-exercise  10' Seated Pre-exercise  10' Seated Pre-exercise  10' Seated Pre-exercise  10' Seated Pre-exercise  10' Seated Pre-exercise  10' Seated Pre-exercise                                      "

## 2024-03-18 ENCOUNTER — EVALUATION (OUTPATIENT)
Dept: PHYSICAL THERAPY | Facility: CLINIC | Age: 69
End: 2024-03-18
Payer: MEDICARE

## 2024-03-18 DIAGNOSIS — M54.17 RADICULOPATHY, LUMBOSACRAL REGION: Primary | ICD-10-CM

## 2024-03-18 PROCEDURE — 97140 MANUAL THERAPY 1/> REGIONS: CPT

## 2024-03-18 PROCEDURE — 97110 THERAPEUTIC EXERCISES: CPT

## 2024-03-18 NOTE — PROGRESS NOTES
PT Discharge    Today's date: 3/18/2024  Patient name: Chinmay Curtis  : 1955  MRN: [id number]  Referring provider: Virgil Palmer MD  Dx:   Encounter Diagnosis     ICD-10-CM    1. Radiculopathy, lumbosacral region  M54.17             Start Time: 0930  Stop Time: 1020  Total time in clinic (min): 50 minutes    Assessment  Assessment details: The patient presents to Physical Therapy today with a notable improvement in symptomatology and function in the lumbar spine with centralization of radicular symptoms. Patient is happy with her progress to this point and feels that her condition is much more manageable. We will d/c patient today from Physical Therapy services and transition to an independent HEP to continue managing her condition. Patient was encouraged to call the office if she has any questions or concerns.     Goals  ST.) Patient will initiate HEP Independently MET   2.) Patient will have a 50% reduction in overall symptoms MET  3.) Patient will demonstrate improved strength evidenced by a 1-2 MMT grade increase MET  4.) Patient will demonstrate improved thoracolumbar mobility evidenced by </= minimal restriction in all planes of motion MET  5.) Patient will demonstrate improved ability to ambulate >/= 1 mile w/o symptoms MET    LT.) Patient will be d/c to an HEP independently MET  2.) Patient will improve functional abilities evidenced by FOTO scores MET  3.) Eliminate pain with functional activity MET  4.) Patient will demonstrate improved ability to lift, push, pull, and carry safely w/o symptoms MET  5.) Return to PLOF MET    Plan  Plan details: Plan of care was reviewed and discussed thoroughly with the patient on their current condition. Patient was instructed on a HEP with written instructions. We will d/c patient today from Physical Therapy services and transition to an independent HEP. Patient was encouraged to call the office if she has any questions or concerns.  Treatment plan  discussed with: patient      Subjective Evaluation    History of Present Illness  Mechanism of injury: The patient reports today with bilateral low back pain that started approximately 5 years ago. She notes having low back surgery 5 years ago at L4/L5 with an experimental device implanted. Patient notes more recently experiencing radiating symptoms into bilateral lower extremities that is most prominent when she is laying down. She reports currently having difficulty with performance of functional activities such as stair navigation, sleeping, sitting, and lifting. She states having a consultation with neurology and is now referred to OPPT.    Update 3-18-24: The patient reports today with an improvement in low back and b/l LE function as well as symptoms. She states a reduction in numbness and tingling into b/l LE's. She notes a reduction in muscle cramping. She feels that her condition is much more manageable and would like to transition to an independent home exercise program today. She is pleased with her progress to this point.  Patient Goals  Patient goal: Goals have been met.  Pain  Current pain ratin  At best pain ratin  At worst pain ratin  Location: low back  Quality: tight        Objective     Concurrent Complaints  Negative for night pain, disturbed sleep, bladder dysfunction, bowel dysfunction and saddle (S4) numbness    Postural Observations  Seated posture: good  Standing posture: good      Tenderness     Lumbar Spine  No tenderness in the spinous process, left transverse process and right transverse process.     Right Hip   No tenderness in the PSIS.     Neurological Testing     Sensation     Lumbar   Left   Intact: light touch    Right   Intact: light touch    Reflexes   Left   Patellar (L4): normal (2+)  Achilles (S1): normal (2+)    Right   Patellar (L4): normal (2+)  Achilles (S1): normal (2+)    Active Range of Motion   Cervical/Thoracic Spine       Thoracic    Flexion:   WFL  Extension:  WFL  Left lateral flexion:  WFL  Right lateral flexion:  WFL  Left rotation:  WFL  Right rotation:  WFL    Lumbar   Flexion:  WFL  Extension:  WFL  Left lateral flexion:  WFL  Right lateral flexion:  WFL  Left rotation:  WFL  Right rotation:  WFL    Additional Active Range of Motion Details  Denies numbness or tinging with thoracolumbar mobility in all planes.    Strength/Myotome Testing     Left Hip   Planes of Motion   Flexion: WFL  Extension: WFL  Abduction: WFL  Adduction: WFL  External rotation: WFL  Internal rotation: WFL    Right Hip   Planes of Motion   Flexion: WFL  Extension: WFL  Abduction: WFL  Adduction: WFL  External rotation: WFL  Internal rotation: WFL    Left Knee   Flexion: WFL  Extension: WFL    Right Knee   Flexion: WFL  Extension: WFL    Left Ankle/Foot   Dorsiflexion: WFL.   Plantar flexion: WFL.   Inversion: WFL.   Eversion: WFL.     Right Ankle/Foot   Dorsiflexion: WFL.   Plantar flexion: WFL.   Inversion: WFL.   Eversion: WFL.     Muscle Activation     Additional Muscle Activation Details  Patient demonstrates improvement in transverse abdominal activation     Tests     Lumbar     Left   Negative crossed SLR and passive SLR.     Right   Negative crossed SLR and passive SLR.     Left Pelvic Girdle/Sacrum   Negative: active SLR test.     Right Pelvic Girdle/Sacrum   Negative: active SLR test.     Left Hip   Negative ALEJANDRO, FADIR and piriformis.   SLR: Negative.     Right Hip   Negative ALEJANDRO, FADIR and piriformis.   SLR: Negative.                Precautions: L4/L5 anterolisthesis w experimental device implanted (2019)    Manuals 2/8 2/12 2/15 2/20 2/21 2/26 2/28 3/13 3/18   Stretching: Prone B Hip IR/ER/Hip Flx/Hams  BM BM BM IR/ER Prone, Hip Flx/Hams Supine BM IR/ER Prone, Hip Flx/Hams Supine BM IR/ER Prone, Hip Flx/Hams Supine  BM IR/ER Prone, Hip Flx/Hams Supine BM IR/ER Prone, Hams Supine BM IR/ER Prone, Hams Supine    STM             P/A                         Neuro  "Re-Ed            TA Draw-in             TA Draw-in w/ PB Press Downs            TA Draw-in w/ CC Anti Rot            TA Draw-in w/ CC ALEJANDRA                                                Ther Ex            PPU 5\" Hold x10 on Elbows 5\" Hold 2x10 on Elbows  5\" Hold 2x10 on Elbows 5\" Hold 2x10 on Elbows  5\" Hold 2x10 on Elbows 5\" Hold 2x10 on Elbows  5\" Hold 2x10 on Elbows 5\" Hold 2x10 on Elbows 5\" Hold 2x10 on Elbows    Prone Hip ER 5\" Hold x10  L2 x10 ea L2 x10 ea L2 x10 ea L3 x10 ea L3 x10 ea L3 x10ea L3 x10 ea L3 x10 ea   Hamstring Stretch  3x20\" Hold ea 3x20\" Hold ea  3x20\" Hold  3x20\" Hold  Standing 3x20\" Hold 10\" step  Standing 3x20\" Hold 10\" Step  Standing 3x20\" ea Hold 10\" step  Standing 3x20\"Hold ea 10\" step  Standing 3x20\" Hold ea 10\" step    Prone Hip IR  L2 x10 ea L2 x10 ea L2 x10 ea L3 x10 ea L3 x10 ea L3 x10 ea L3 x10 ea L3 x10 ea   Prone Ball Squeeze   5\" Hold x10  5\" Hold x10  5\" Hold x10  5\" Hold x10  5\" Hold x10  5\" Hold x10  5\" Hold x10    Prone Hip Ext w/ Knee Bent  X10 ea X10 ea X10 ea X10 ea X10 ea X10 ea X10 ea X10 ea   Seated Thoracic Ext  5\" Hold x10 w 1/2 foam  5\" Hold x10 w 1/2 foam  5\" Hold x10 w 1/2 foam  5\" Hold x10 w 1/2 foam  5\" Hold x10 w 1/2 foam  5\" Hold x10 w 1/2 foam  5\" Hold x10 w 1/2 foam  5\" Hold x10 w 1/2 foam    Standing Thoracic Ext            Standing Hip Flexor Stretch    5\" Hold x10  5\" Hold x10  5\" Hold x10 3x20\" Hold ea  3x20\" Hold ea 10\" step  3x20\" Hold ea 10\" step    HEP Instruction BM           Ther Activity                                    Gait Training                                    Modalities            MHP  5' 10' Seated Pre-exercise  10' Seated Pre-exercise  10' Seated Pre-exercise  10' Seated Pre-exercise  10' Seated Pre-exercise  10' Seated Pre-exercise  10' Seated Pre-exercise  10' Seated Pre-exercise                       "

## 2024-03-20 ENCOUNTER — APPOINTMENT (OUTPATIENT)
Dept: PHYSICAL THERAPY | Facility: CLINIC | Age: 69
End: 2024-03-20
Payer: MEDICARE

## 2024-03-25 ENCOUNTER — APPOINTMENT (OUTPATIENT)
Dept: PHYSICAL THERAPY | Facility: CLINIC | Age: 69
End: 2024-03-25
Payer: MEDICARE

## 2024-03-27 ENCOUNTER — APPOINTMENT (OUTPATIENT)
Dept: PHYSICAL THERAPY | Facility: CLINIC | Age: 69
End: 2024-03-27
Payer: MEDICARE

## 2024-04-17 ENCOUNTER — NURSE TRIAGE (OUTPATIENT)
Age: 69
End: 2024-04-17

## 2024-04-17 ENCOUNTER — OFFICE VISIT (OUTPATIENT)
Dept: GASTROENTEROLOGY | Facility: CLINIC | Age: 69
End: 2024-04-17
Payer: MEDICARE

## 2024-04-17 ENCOUNTER — TELEPHONE (OUTPATIENT)
Age: 69
End: 2024-04-17

## 2024-04-17 VITALS
OXYGEN SATURATION: 97 % | HEART RATE: 81 BPM | BODY MASS INDEX: 29.33 KG/M2 | WEIGHT: 159.4 LBS | HEIGHT: 62 IN | TEMPERATURE: 98.1 F | DIASTOLIC BLOOD PRESSURE: 93 MMHG | SYSTOLIC BLOOD PRESSURE: 139 MMHG | RESPIRATION RATE: 16 BRPM

## 2024-04-17 DIAGNOSIS — R10.84 GENERALIZED ABDOMINAL PAIN: Primary | ICD-10-CM

## 2024-04-17 DIAGNOSIS — K59.04 CHRONIC IDIOPATHIC CONSTIPATION: ICD-10-CM

## 2024-04-17 DIAGNOSIS — Z12.11 SCREENING FOR COLON CANCER: ICD-10-CM

## 2024-04-17 DIAGNOSIS — K64.9 HEMORRHOIDS, UNSPECIFIED HEMORRHOID TYPE: ICD-10-CM

## 2024-04-17 DIAGNOSIS — K21.9 GASTROESOPHAGEAL REFLUX DISEASE WITHOUT ESOPHAGITIS: ICD-10-CM

## 2024-04-17 PROCEDURE — G2211 COMPLEX E/M VISIT ADD ON: HCPCS | Performed by: NURSE PRACTITIONER

## 2024-04-17 PROCEDURE — 99214 OFFICE O/P EST MOD 30 MIN: CPT | Performed by: NURSE PRACTITIONER

## 2024-04-17 RX ORDER — POLYETHYLENE GLYCOL 3350 17 G/17G
17 POWDER, FOR SOLUTION ORAL DAILY
COMMUNITY

## 2024-04-17 RX ORDER — MULTIVITAMIN WITH IRON
1 TABLET ORAL DAILY
COMMUNITY
End: 2024-04-17

## 2024-04-17 NOTE — TELEPHONE ENCOUNTER
Regarding: constipationn,nausea and hemorrhoids  ----- Message from Darshana Gutierrez sent at 4/17/2024  8:50 AM EDT -----  Patient called in stating she is having lots of trouble using the bathroom due to hemorrhoids. Patient stated she is constipated and is experiencing nausea. Patient is looking for recommendation on what to do.

## 2024-04-17 NOTE — PROGRESS NOTES
[facility] - Outpatient Follow-up Note  Chinmay Curtis 68 y.o. female MRN: [id number]  Encounter: [id number]          ASSESSMENT AND PLAN:    The patient presents today for follow-up office visit regarding her generalized abdominal pain, hemorrhoids, chronic constipation, and intermittent GERD symptoms.     Exam: Abdomen is softly distended, with mild, but diffuse tenderness, especially in the left lower quadrant, without any significant guarding or rebound tenderness on upon exam, with faint bowel sounds x 4. No edema noted of the b/l lower extremities upon exam today. Skin is non-icteric.      1. Generalized abdominal pain  2. Hemorrhoids, unspecified hemorrhoid type  3. Chronic idiopathic constipation  While the patient was in the office today, I discussed with the patient that I think that at this point time her biggest issue is most likely because of her lack of water intake and would like her to increase her daily water intake to at least 4-5 bottles or glasses per day with an ultimate goal of at least 6 to 8/day.  I also would like the patient to start MiraLAX daily and see how she does.    While the patient was in the office today, I did discuss with them that at this point we need to find a better bowel regimen with a goal of having a relieving, non-straining bowel movement at least every other day, if not daily. I discussed that our goal would also be that they never go past 2 days without having a bowel movement.      Continue with a balanced diet.  Consider looking at the low FODMAP diet to help with decreasing foods that cause bloating.  Can continue Preparation H as needed.    4. Gastroesophageal reflux disease without esophagitis  Stable    I discussed with the patient that ultimately I feel what she refers to his GERD symptoms most likely the bloating from the constipation and hopefully we can better address the constipation and provide a better bowel  regimen she will notice improvement in the symptoms as well.    Encouraged the patient to avoid acidic or trigger foods including alcohol as much as possible.  Encouraged the patient to consider purchasing a wedge pillow to help prevent reflux symptoms while sleeping.  Encouraged the patient not to lay down or sleep for at least 3 to 4 hours after eating and to try to avoid late night snacking.     5. Screening for colon cancer  We will hold off on any repeat colonoscopies until the recommended surveillance date unless the patient would start to develop red flag symptoms in the future.  The patient was agreeable and verbalized an understanding.  DUE: 6/14/26    Reviewed GI red flag symptoms, including, but not limited to: chronic nausea, vomiting, diarrhea, chills, fever, and unintentional weight loss and should call or contact our office with any changes or concerns. I reviewed with the patient that if they notice any blood while vomiting or in their stool they should contact or office or go to the nearest emergency room for immediate evaluation. The patient was agreeable and verbalized an understanding.     The patient will schedule a follow up office visit in 3-4 months, but understands to call or contact our office if there are any issues or concerns in the mean time.  ______________________________________________________________________    SUBJECTIVE: Patient is a 68 y.o. female who was previously seen in our office on 8/30/22 by PIA Coker and presents today for follow-up office visit regarding her generalized abdominal pain, hemorrhoids, chronic constipation, and intermittent GERD symptoms.  The patient reports that she has dealt with chronic constipation issues most of her life and that in the past probiotics and MiraLAX has been helpful, but she stopped taking the MiraLAX consistently because she was afraid that it would not be good for her she did.  The patient reports that she has been taking  probiotics which she feels was initially helpful but does not seem to be helping currently.  She reports that she has a lot of issues with straining and is having a lot of issues with hemorrhoids and hemorrhoidal bleeding.  The patient reports that she typically has to use Dulcolax or Dulcolax suppositories in order to have a bowel movement.  The patient reports she has been having a lot of pain and discomfort when she sits and has been using copious amounts of Preparation H which does help for short periods of time.  She does report that over the past week she has really been trying to diet and eat better.    The patient denies any nausea, dysphagia, vomiting, decreased appetite, unplanned weight loss, or abdominal pain. Water Intake: 2-3 glasses per day.    The patient reports that they have a BM every 2 to 3 days and reports that it not usually relieving, without any consistent diarrhea, nocturnal BMs, constipation, straining, melena or bloody stools.     Meds: None  Daily NSAID Use: Denied    Tobacco: Denied  ETOH: Denied  Marijuana: Denied  Illicit Drug Use: Denied    Imaging: (5/21/23): CT of the abdomen pelvis with contrast: Normal.    Endoscopy History: EGD: (6/14/23): Normal. Path: Normal    COLONOSCOPY: (6/14/23): 7 tubular adenomas with moderate diverticulosis and significant luminal narrowing with a recommendation of a repeat colonoscopy in 3 years secondary to the personal history of colon polyps and tubular adenomas.    DUE: 6/14/26    REVIEW OF SYSTEMS IS OTHERWISE NEGATIVE.      Historical Information   Past Medical History:   Diagnosis Date    Bell's palsy     Colon polyp     Hypertension     Kidney calculi     Spinal stenosis      Past Surgical History:   Procedure Laterality Date    CHOLECYSTECTOMY      COLONOSCOPY      ENDOMETRIAL ABLATION      LAMINECTOMY  06/14/2019    Lumbar spine - With placement of Trial device nonfusion device     Social History   Social History     Substance and Sexual  "Activity   Alcohol Use Never    Comment: occasionally     Social History     Substance and Sexual Activity   Drug Use Never     Social History     Tobacco Use   Smoking Status Never   Smokeless Tobacco Never     Family History   Problem Relation Age of Onset    Dementia Mother     Heart disease Mother     Emphysema Father     No Known Problems Daughter     No Known Problems Maternal Grandmother     No Known Problems Maternal Grandfather     No Known Problems Paternal Grandmother     No Known Problems Paternal Grandfather     Colon cancer Maternal Uncle     No Known Problems Maternal Uncle     No Known Problems Maternal Uncle     No Known Problems Maternal Uncle     No Known Problems Maternal Uncle     No Known Problems Son     No Known Problems Brother        Meds/Allergies       Current Outpatient Medications:     acetaminophen (TYLENOL) 325 mg tablet    Ascorbic Acid (VITAMIN C PO)    cholecalciferol (VITAMIN D3) 1,000 units tablet    Magnesium 250 MG TABS    pantoprazole (PROTONIX) 40 mg tablet    polyethylene glycol (MIRALAX) 17 g packet    Probiotic Product (PROBIOTIC PO)    Allergies   Allergen Reactions    Tramadol Vomiting    Ciprofloxacin Other (See Comments)     unknown    Lisinopril      Pt had one dose and became became presyncopal    Prednisone GI Intolerance           Objective     Blood pressure 139/93, pulse 81, temperature 98.1 °F (36.7 °C), temperature source Tympanic, resp. rate 16, height 5' 2\" (1.575 m), weight 72.3 kg (159 lb 6.4 oz), SpO2 97%. Body mass index is 29.15 kg/m².      PHYSICAL EXAM:      General Appearance:   Alert, cooperative, no distress   HEENT:   Normocephalic, atraumatic, anicteric.     Neck:  Supple, symmetrical, trachea midline   Lungs:   Clear to auscultation bilaterally; no rales, rhonchi or wheezing; respirations unlabored    Heart::   Regular rate and rhythm; no murmur, rub, or gallop.   Abdomen:   Soft, non-tender, non-distended; normal bowel sounds; no masses, no " organomegaly    Genitalia:   Deferred    Rectal:   Deferred    Extremities:  No cyanosis, clubbing or edema    Pulses:  2+ and symmetric    Skin:  No jaundice, rashes, or lesions    Lymph nodes:  No palpable cervical lymphadenopathy        Lab Results:   No visits with results within 1 Day(s) from this visit.   Latest known visit with results is:   Office Visit on 10/18/2023   Component Date Value    LEUKOCYTE ESTERASE,UA 10/18/2023 moderate     NITRITE,UA 10/18/2023 negative     SL AMB POCT UROBILINOGEN 10/18/2023 0.2     POCT URINE PROTEIN 10/18/2023 negative      PH,UA 10/18/2023 5     BLOOD,UA 10/18/2023 moderate     SPECIFIC GRAVITY,UA 10/18/2023 1.010     KETONES,UA 10/18/2023 negative     BILIRUBIN,UA 10/18/2023 negative     GLUCOSE, UA 10/18/2023 negative      COLOR,UA 10/18/2023 yellow     CLARITY,UA 10/18/2023 hazy     Urine Culture 10/18/2023 10,000-19,000 cfu/ml Escherichia coli (A)          Radiology Results:   No results found.

## 2024-04-17 NOTE — TELEPHONE ENCOUNTER
A message was sent to the nurse's regarding patient's symptoms. Call got stuck on the nurse triage line.

## 2024-04-17 NOTE — TELEPHONE ENCOUNTER
"Last ov 8/30/22 NAA Ross, Procedure combo 6/14/23, Patient currently scheduled for appointment today. I spoke with patient and she states original call dropped and when she called back she was scheduled for appointment today so no triage necessary at this time.      Reason for Disposition   Information only question and nurse able to answer    Answer Assessment - Initial Assessment Questions  1. REASON FOR CALL or QUESTION: \"What is your reason for calling today?\" or \"How can I best help you?\" or \"What question do you have that I can help answer?\"      Patient called with symptoms, call dropped and she called back and scheduled today to see provider at [place].    Protocols used: Information Only Call - No Triage-ADULT-[place]    "

## 2024-04-17 NOTE — PATIENT INSTRUCTIONS
Try to increase water intake to at least 4-5 bottles or glasses per day.   Start miralax daily.   Continue with your balanced diet.   Look at [employer] diet.   Can continue to use preparation H as needed.   Schedule a f/u OV in 3-4 months.   Continue to watch for red flag symptoms.     We did review GI red flag symptoms, including, but not limited to: chronic nausea, vomiting, diarrhea, chills, fever, and unintentional weight loss and should call or contact our office with any changes or concerns. I reviewed with the patient that if they notice any blood while vomiting or in their stool they should contact or office or go to the nearest emergency room for immediate evaluation. The patient was agreeable and verbalized an understanding.

## 2024-04-18 PROBLEM — Z12.11 SCREENING FOR COLON CANCER: Status: ACTIVE | Noted: 2024-04-18

## 2024-04-18 PROBLEM — K64.9 HEMORRHOIDS: Status: ACTIVE | Noted: 2024-04-18

## 2024-05-02 NOTE — TELEPHONE ENCOUNTER
Call from patient requesting call back with instructions for her 8/30/2022 colonoscopy  Patient also stated she visited the ED on 6/24/2022 for pain in her upper stomach and was told by the provider there to schedule a endoscope  Please call patient  yes...

## 2024-05-18 PROBLEM — Z12.11 SCREENING FOR COLON CANCER: Status: RESOLVED | Noted: 2024-04-18 | Resolved: 2024-05-18

## 2024-07-01 ENCOUNTER — APPOINTMENT (OUTPATIENT)
Dept: RADIOLOGY | Facility: MEDICAL CENTER | Age: 69
End: 2024-07-01
Payer: MEDICARE

## 2024-07-01 ENCOUNTER — OFFICE VISIT (OUTPATIENT)
Dept: URGENT CARE | Facility: MEDICAL CENTER | Age: 69
End: 2024-07-01
Payer: MEDICARE

## 2024-07-01 VITALS
DIASTOLIC BLOOD PRESSURE: 98 MMHG | BODY MASS INDEX: 30 KG/M2 | OXYGEN SATURATION: 97 % | SYSTOLIC BLOOD PRESSURE: 132 MMHG | WEIGHT: 164 LBS | HEART RATE: 87 BPM | TEMPERATURE: 97.5 F | RESPIRATION RATE: 20 BRPM
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
## 2024-07-01 DIAGNOSIS — M54.16 LUMBAR RADICULOPATHY: Primary | ICD-10-CM

## 2024-07-01 DIAGNOSIS — M54.16 LUMBAR RADICULOPATHY: ICD-10-CM
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
## 2024-07-01 DIAGNOSIS — S30.861A INSECT BITE OF ABDOMEN, INITIAL ENCOUNTER: ICD-10-CM

## 2024-07-01 PROCEDURE — G0463 HOSPITAL OUTPT CLINIC VISIT: HCPCS

## 2024-07-01 PROCEDURE — 99214 OFFICE O/P EST MOD 30 MIN: CPT

## 2024-07-01 PROCEDURE — 72110 X-RAY EXAM L-2 SPINE 4/>VWS: CPT

## 2024-07-01 PROCEDURE — 72072 X-RAY EXAM THORAC SPINE 3VWS: CPT

## 2024-07-01 RX ORDER — NAPROXEN 500 MG/1
500 TABLET ORAL 2 TIMES DAILY WITH MEALS
Qty: 20 TABLET | Refills: 0 | Status: SHIPPED | OUTPATIENT
Start: 2024-07-01

## 2024-07-01 RX ORDER — OMEGA-3S/DHA/EPA/FISH OIL/D3 300MG-1000
400 CAPSULE ORAL DAILY
COMMUNITY

## 2024-07-01 NOTE — PROGRESS NOTES
[facility]        NAME: Chinmay Curtis is a 69 y.o. female  : 1955    MRN: [id number]  DATE: 2024  TIME: 11:06 AM    Assessment and Plan   Lumbar radiculopathy [M54.16]  1. Lumbar radiculopathy  XR spine lumbar minimum 4 views non injury    XR spine thoracic 3 vw    naproxen (EC NAPROSYN) 500 MG EC tablet      2. Fall, initial encounter  XR spine thoracic 3 vw    naproxen (EC NAPROSYN) 500 MG EC tablet      3. Insect bite of abdomen, initial encounter              Patient Instructions     Please call and make an appointment with your spine specialist/surgeon.    Follow up with PCP in 3-5 days.  Proceed to  ER if symptoms worsen.    If tests are performed, our office will contact you with results only if changes need to made to the care plan discussed with you at the visit. You can review your full results on [facility].    Chief Complaint     Chief Complaint   Patient presents with   • Abdominal Pain   • Leg Numbness   • Insect Bite     Bee sting to abdomen   • Back Pain     Had back surgery to lower back that goes down her legs and causes numbness and tingling to her lower legs. Stats she had lumbar surgery 5 years ago and the SX feel the same. Slid down a little hill in her yard, that is causing mid to upper back pain. Also has a wasp sting to her mid left abdomen area that is red and swollen, stung yesterday, Has not taken anything for pain since yesterday morning.          History of Present Illness       Patient here with multiple problems/concerns:    1. Upper back under right shoulder blade & across back, having pain. X2 weeks ago was wearing flip flops and slid down a wet grass hill at home- having pain since that time. Slipped fell onto buttocks at that time. Denies any respiratory problems.     2. Waist line having lower back pain. She reports she has been having this for over 1.5 mths. history of lumbar spine surgery- L4-L5 hardware.(Participated in a study) She reports  having pain which radiates down b/l legs and reports numbness/tingling. She has had this for over 1.5mth. Has a follow up appt for Dr. Cole (Spine surgery) end of August, she does not have an scheduled appt but reports 'they call me because it was part of a study'. At home has not taken anything for her symptoms or done anything for pain.     History of lumbar radiculopathy with prior PT according to chart review      **Most recent MRI 9/2023: IMPRESSION: Lower thoracic and lumbar spondylosis associated with spinal canal and neural foraminal narrowing detailed level by level above and most notable at L4-L5 where there is evidence of neural encroachment upon exiting left L4 nerve root.   Postoperative changes L3-L4 and L4-L5 with normal postoperative appearance.     3. Yesterday she was bit by a wasp on her abdomen. Concerned because the area is red, and swollen.     She has not had any fever/chills. No loss of control of bowel of bladder, denies any urinary symptoms. At times has some constipation and uses Miralax daily for this. She has had swelling in her left lower extremity for about 1.5mths. She has not noted improvement at night time with this swelling. Denies any shortness of breath.       Provider Radiology Interpretation (preliminary)   Final results will be as per official Radiology Report when available:   ThoracoLumbar: No acute fractures noted. Chronic degenerative changes noted. Stable hardware at the L4-L5 posteriorly.         Review of Systems   Review of Systems   Constitutional:  Negative for appetite change, chills, fatigue and fever.   HENT:  Negative for congestion, rhinorrhea, sore throat and trouble swallowing.    Respiratory:  Negative for cough, chest tightness and shortness of breath.    Cardiovascular:  Negative for chest pain, palpitations and leg swelling.   Gastrointestinal:  Negative for abdominal pain, constipation, diarrhea, nausea and vomiting.   Musculoskeletal:  Positive for  arthralgias, back pain and gait problem. Negative for neck pain and neck stiffness.   Skin:  Positive for wound. Negative for color change and rash.   Neurological:  Negative for dizziness, light-headedness and headaches.         Current Medications       Current Outpatient Medications:   •  cholecalciferol (VITAMIN D3) 400 units tablet, Take 400 Units by mouth daily, Disp: , Rfl:   •  naproxen (EC NAPROSYN) 500 MG EC tablet, Take 1 tablet (500 mg total) by mouth 2 (two) times a day with meals, Disp: 20 tablet, Rfl: 0  •  polyethylene glycol (MIRALAX) 17 g packet, Take 17 g by mouth daily, Disp: , Rfl:   •  Probiotic Product (PROBIOTIC PO), Take by mouth daily, Disp: , Rfl:     Current Allergies     Allergies as of 07/01/2024 - Reviewed 07/01/2024   Allergen Reaction Noted   • Tramadol Vomiting 04/28/2016   • Ciprofloxacin Other (See Comments) 04/28/2016   • Lisinopril  12/09/2017   • Prednisone GI Intolerance 02/29/2016            The following portions of the patient's history were reviewed and updated as appropriate: allergies, current medications, past family history, past medical history, past social history, past surgical history and problem list.     Past Medical History:   Diagnosis Date   • Bell's palsy    • Colon polyp    • Hypertension    • Kidney calculi    • Spinal stenosis        Past Surgical History:   Procedure Laterality Date   • CHOLECYSTECTOMY     • COLONOSCOPY     • ENDOMETRIAL ABLATION     • LAMINECTOMY  06/14/2019    Lumbar spine - With placement of Trial device nonfusion device       Family History   Problem Relation Age of Onset   • Dementia Mother    • Heart disease Mother    • Emphysema Father    • No Known Problems Daughter    • No Known Problems Maternal Grandmother    • No Known Problems Maternal Grandfather    • No Known Problems Paternal Grandmother    • No Known Problems Paternal Grandfather    • Colon cancer Maternal Uncle    • No Known Problems Maternal Uncle    • No Known Problems  Maternal Uncle    • No Known Problems Maternal Uncle    • No Known Problems Maternal Uncle    • No Known Problems Son    • No Known Problems Brother          Medications have been verified.        Objective   /98   Pulse 87   Temp 97.5 °F (36.4 °C)   Resp 20   Wt 74.4 kg (164 lb)   SpO2 97%   BMI 30.00 kg/m²        Physical Exam     Physical Exam  Vitals and nursing note reviewed.   Constitutional:       General: She is awake.      Appearance: Normal appearance. She is well-developed. She is obese.   HENT:      Head: Normocephalic and atraumatic.      Nose: Nose normal.      Mouth/Throat:      Lips: Pink.      Mouth: Mucous membranes are moist.   Cardiovascular:      Rate and Rhythm: Normal rate and regular rhythm.      Pulses: Normal pulses.      Heart sounds: Normal heart sounds, S1 normal and S2 normal. No murmur heard.  Pulmonary:      Effort: Pulmonary effort is normal.      Breath sounds: Normal breath sounds.   Abdominal:      General: Abdomen is flat.      Palpations: Abdomen is soft.       Musculoskeletal:      Cervical back: Full passive range of motion without pain, normal range of motion and neck supple.      Thoracic back: Tenderness present. No bony tenderness.      Lumbar back: Tenderness present. Normal range of motion. Negative right straight leg raise test and negative left straight leg raise test.        Back:       Comments: Negative seated SLR, has discomfort in thigh but no significant pain up into back.     Strength 5/5   Skin:     General: Skin is warm and dry.      Capillary Refill: Capillary refill takes less than 2 seconds.      Findings: Erythema present. No abrasion, abscess, bruising or rash.      Comments: Mild erythema     Neurological:      General: No focal deficit present.      Mental Status: She is alert. Mental status is at baseline.   Psychiatric:         Mood and Affect: Mood normal.         Behavior: Behavior is cooperative.

## 2024-07-01 NOTE — PATIENT INSTRUCTIONS
Please follow up with your spine specialist by call their office to make an appt.     You may take over the counter Tylenol (Acetaminophen)  as needed, as directed on packaging.

## 2024-07-17 ENCOUNTER — OFFICE VISIT (OUTPATIENT)
Age: 69
End: 2024-07-17
Payer: MEDICARE

## 2024-07-17 VITALS
TEMPERATURE: 97 F | RESPIRATION RATE: 18 BRPM | WEIGHT: 164 LBS | HEIGHT: 62 IN | BODY MASS INDEX: 30.18 KG/M2 | SYSTOLIC BLOOD PRESSURE: 130 MMHG | HEART RATE: 79 BPM | OXYGEN SATURATION: 97 % | DIASTOLIC BLOOD PRESSURE: 88 MMHG

## 2024-07-17 DIAGNOSIS — R10.84 GENERALIZED ABDOMINAL PAIN: Primary | ICD-10-CM

## 2024-07-17 DIAGNOSIS — K59.04 CHRONIC IDIOPATHIC CONSTIPATION: ICD-10-CM

## 2024-07-17 DIAGNOSIS — R11.0 NAUSEA: ICD-10-CM

## 2024-07-17 DIAGNOSIS — K64.9 HEMORRHOIDS, UNSPECIFIED HEMORRHOID TYPE: ICD-10-CM

## 2024-07-17 DIAGNOSIS — D12.6 TUBULAR ADENOMA OF COLON: ICD-10-CM

## 2024-07-17 DIAGNOSIS — Z86.010 HISTORY OF COLON POLYPS: ICD-10-CM

## 2024-07-17 PROBLEM — Z86.0100 HISTORY OF COLON POLYPS: Status: ACTIVE | Noted: 2024-07-17

## 2024-07-17 PROCEDURE — 99214 OFFICE O/P EST MOD 30 MIN: CPT | Performed by: NURSE PRACTITIONER

## 2024-07-17 RX ORDER — POLYETHYLENE GLYCOL 3350 17 G/17G
17 POWDER, FOR SOLUTION ORAL 2 TIMES DAILY
Qty: 578 G | Refills: 6 | Status: SHIPPED | OUTPATIENT
Start: 2024-07-17

## 2024-07-17 NOTE — PATIENT INSTRUCTIONS
Start Fiber supplementation daily in AM, can titrate up or down depending on how you are feeling.   Continue miralax daily HS and can eventually can increase to 1 and 1/4 or 1 and 1/2 capfuls to help empty fully.   Can consider in the future trying Linzess or Amitiza in the future.   Continue to try to drink at least 64 oz of water daily.   Continue to watch for red flag symptoms.   Schedule a f/u OV in 6 months.     We did review GI red flag symptoms, including, but not limited to: chronic nausea, vomiting, diarrhea, chills, fever, and unintentional weight loss and should call or contact our office with any changes or concerns. I reviewed with the patient that if they notice any blood while vomiting or in their stool they should contact or office or go to the nearest emergency room for immediate evaluation. The patient was agreeable and verbalized an understanding.

## 2024-07-17 NOTE — PROGRESS NOTES
[facility] - Outpatient Follow-up Note  Chinmay Curtis 69 y.o. female MRN: [id number]  Encounter: [id number]          ASSESSMENT AND PLAN:    The patient presents today for follow-up office visit regarding her generalized abdominal pain, nausea, hemorrhoids, chronic idiopathic constipation, history of colon polyps with tubular adenomas.      Exam: Abdomen is soft, nondistended, nontender, with hypoactive bowel sounds x 4. No edema noted of the b/l lower extremities upon exam today. Skin is non-icteric.      1. Generalized abdominal pain  2. Nausea  3. Hemorrhoids, unspecified hemorrhoid type  4. Chronic idiopathic constipation  Assessment & Plan:  Improving  Start fiber supplementation daily in a.m. and can titrate up or down depending on how she is feeling.  Continue MiraLAX daily at bedtime and consider increasing to 1-1/4 capfuls if not 1-1/2 capfuls daily to help make sure she is emptying fully.  Can consider trying Linzess or Amitiza in the future, however, we are holding off per patient's wishes.  Continue to try to drink at least 64 ounces of water daily.  Orders:  -     polyethylene glycol (MIRALAX) 17 g packet; Take 17 g by mouth 2 (two) times a day  5. Tubular adenoma of colon  6. History of colon polyps  Assessment & Plan:  We will hold off on any repeat colonoscopies until the recommended surveillance date unless the patient would start to develop red flag symptoms in the future.  The patient was agreeable and verbalized an understanding.  DUE: 6/14/26    Reviewed GI red flag symptoms with patient today.      The patient will schedule a follow up office visit in 6 months, but understands to call or contact our office if there are any issues or concerns in the mean time.  ______________________________________________________________________    SUBJECTIVE: Patient is a 69 y.o. female who was previously seen in our office on 4/17/24 and presents today for  follow-up office visit regarding her generalized abdominal pain, nausea, hemorrhoids, chronic idiopathic constipation, history of colon polyps with tubular adenomas.  The patient reports that since her last office visit she has continued with generalized intermittent abdominal pain, nausea and chronic constipation with hemorrhoids.  The patient reports that she continues to use Preparation H for her hemorrhoids and MiraLAX seems to be helping, but is still having times where she has to strain or push hard to have a bowel movement on a regular basis.  She reports she has also been eating a lot of fruit lately, especially fresh cherries which seems to cause abdominal pain and cramping.    The patient denies any reflux, nausea, dysphagia, vomiting, decreased appetite, unplanned weight loss, or abdominal pain.    The patient reports that they have a BM 2-6 times daily and reports that it is not usually relieving, without any consistent diarrhea, nocturnal BMs, melena or bloody stools. Last BM: Today. Flatus: Yes.    Meds: MiraLAX daily.  Daily NSAID Use: Denied    Imaging: (None):     Endoscopy History: EGD: (6/14/23): Normal. Path: Normal     COLONOSCOPY: (6/14/23): 7 tubular adenomas with moderate diverticulosis and significant luminal narrowing with a recommendation of a repeat colonoscopy in 3 years secondary to the personal history of colon polyps and tubular adenomas.     DUE: 6/14/26    REVIEW OF SYSTEMS IS OTHERWISE NEGATIVE.      Historical Information   Past Medical History:   Diagnosis Date    Bell's palsy     Colon polyp     Hypertension     Kidney calculi     Spinal stenosis      Past Surgical History:   Procedure Laterality Date    CHOLECYSTECTOMY      COLONOSCOPY      ENDOMETRIAL ABLATION      LAMINECTOMY  06/14/2019    Lumbar spine - With placement of Trial device nonfusion device     Social History   Social History     Substance and Sexual Activity   Alcohol Use Never    Comment: occasionally     Social  "History     Substance and Sexual Activity   Drug Use Never     Social History     Tobacco Use   Smoking Status Never   Smokeless Tobacco Never     Family History   Problem Relation Age of Onset    Dementia Mother     Heart disease Mother     Emphysema Father     No Known Problems Daughter     No Known Problems Maternal Grandmother     No Known Problems Maternal Grandfather     No Known Problems Paternal Grandmother     No Known Problems Paternal Grandfather     Colon cancer Maternal Uncle     No Known Problems Maternal Uncle     No Known Problems Maternal Uncle     No Known Problems Maternal Uncle     No Known Problems Maternal Uncle     No Known Problems Son     No Known Problems Brother        Meds/Allergies       Current Outpatient Medications:     cholecalciferol (VITAMIN D3) 400 units tablet    polyethylene glycol (MIRALAX) 17 g packet    Probiotic Product (PROBIOTIC PO)    Allergies   Allergen Reactions    Tramadol Vomiting    Ciprofloxacin Other (See Comments)     unknown    Lisinopril      Pt had one dose and became became presyncopal    Prednisone GI Intolerance           Objective     Blood pressure 130/88, pulse 79, temperature (!) 97 °F (36.1 °C), resp. rate 18, height 5' 2\" (1.575 m), weight 74.4 kg (164 lb), SpO2 97%. Body mass index is 30 kg/m².      PHYSICAL EXAM:      General Appearance:   Alert, cooperative, no distress   HEENT:   Normocephalic, atraumatic, anicteric.     Neck:  Supple, symmetrical, trachea midline   Lungs:   Clear to auscultation bilaterally; no rales, rhonchi or wheezing; respirations unlabored    Heart::   Regular rate and rhythm; no murmur, rub, or gallop.   Abdomen:   Soft, non-tender, non-distended; normal bowel sounds; no masses, no organomegaly    Genitalia:   Deferred    Rectal:   Deferred    Extremities:  No cyanosis, clubbing or edema    Pulses:  2+ and symmetric    Skin:  No jaundice, rashes, or lesions    Lymph nodes:  No palpable cervical lymphadenopathy        Lab " Results:   No visits with results within 1 Day(s) from this visit.   Latest known visit with results is:   Office Visit on 10/18/2023   Component Date Value    LEUKOCYTE ESTERASE,UA 10/18/2023 moderate     NITRITE,UA 10/18/2023 negative     SL AMB POCT UROBILINOGEN 10/18/2023 0.2     POCT URINE PROTEIN 10/18/2023 negative      PH,UA 10/18/2023 5     BLOOD,UA 10/18/2023 moderate     SPECIFIC GRAVITY,UA 10/18/2023 1.010     KETONES,UA 10/18/2023 negative     BILIRUBIN,UA 10/18/2023 negative     GLUCOSE, UA 10/18/2023 negative      COLOR,UA 10/18/2023 yellow     CLARITY,UA 10/18/2023 hazy     Urine Culture 10/18/2023 10,000-19,000 cfu/ml Escherichia coli (A)          Radiology Results:   XR spine thoracic 3 vw    Result Date: 7/2/2024  Narrative: XR SPINE THORACIC 3 VW INDICATION: M54.16: Radiculopathy, lumbar region W19.XXXA: Unspecified fall, initial encounter. COMPARISON: None FINDINGS: No acute fracture. Intact pedicles. Normal alignment. Age-related degenerative changes are seen. No displacement of the paraspinal line.     Impression: No acute osseous abnormality.  Degenerative changes as described. Computerized Assisted Algorithm (CAA) may have been used to analyze all applicable images. Workstation performed: [id number]     XR spine lumbar minimum 4 views non injury    Result Date: 7/2/2024  Narrative: XR SPINE LUMBAR MINIMUM 4 VIEWS NON INJURY INDICATION: M54.16: Radiculopathy, lumbar region. COMPARISON: 8/30/2023 FINDINGS: There is bilateral pedicle screw and stabilizing artie fixation at the L4-L5 levels .  No hardware complication.  Stable  alignment with grade 1 anterolisthesis at L4-L5. No acute fracture. Intact pedicles. Five non-rib-bearing lumbar vertebral bodies. Age-appropriate lumbar degenerative changes. Unremarkable soft tissues.     Impression: Stable postoperative changes.  No evidence of osseous or hardware complication. Computerized Assisted Algorithm (CAA) may have been used to analyze all  applicable images. Workstation performed: [id number]    Answers submitted by the patient for this visit:  Abdominal Pain Questionnaire (Submitted on 7/17/2024)  Chief Complaint: Abdominal pain  Chronicity: chronic  Onset: 1 to 4 weeks ago  Onset quality: gradual  Frequency: 2 to 4 times per day  Progression since onset: waxing and waning  Pain location: epigastric region  Pain - numeric: 4/10  Pain quality: aching, a sensation of fullness  Radiates to: epigastric region  anorexia: No  arthralgias: Yes  constipation: Yes  dysuria: No  fever: No  flatus: No  frequency: No  hematochezia: No  hematuria: No  melena: No  myalgias: Yes  nausea: Yes  Relieved by: bowel movements, passing flatus

## 2024-07-22 NOTE — ASSESSMENT & PLAN NOTE
We will hold off on any repeat colonoscopies until the recommended surveillance date unless the patient would start to develop red flag symptoms in the future.  The patient was agreeable and verbalized an understanding.  DUE: 6/14/26    Reviewed GI red flag symptoms with patient today.

## 2024-07-22 NOTE — ASSESSMENT & PLAN NOTE
Improving  Start fiber supplementation daily in a.m. and can titrate up or down depending on how she is feeling.  Continue MiraLAX daily at bedtime and consider increasing to 1-1/4 capfuls if not 1-1/2 capfuls daily to help make sure she is emptying fully.  Can consider trying Linzess or Amitiza in the future, however, we are holding off per patient's wishes.  Continue to try to drink at least 64 ounces of water daily.

## 2024-08-06 ENCOUNTER — HOSPITAL ENCOUNTER (EMERGENCY)
Facility: HOSPITAL | Age: 69
Discharge: HOME | DRG: 603 | End: 2024-08-06
Attending: EMERGENCY MEDICINE
Payer: MEDICARE

## 2024-08-06 VITALS
BODY MASS INDEX: 28.52 KG/M2 | WEIGHT: 155 LBS | TEMPERATURE: 97.2 F | DIASTOLIC BLOOD PRESSURE: 74 MMHG | OXYGEN SATURATION: 96 % | HEIGHT: 62 IN | HEART RATE: 88 BPM | RESPIRATION RATE: 16 BRPM | SYSTOLIC BLOOD PRESSURE: 157 MMHG

## 2024-08-06 DIAGNOSIS — T63.451A STING FROM HORNET, WASP, OR BEE: ICD-10-CM

## 2024-08-06 DIAGNOSIS — T63.441A STING FROM HORNET, WASP, OR BEE: ICD-10-CM

## 2024-08-06 DIAGNOSIS — T63.461A STING FROM HORNET, WASP, OR BEE: ICD-10-CM

## 2024-08-06 DIAGNOSIS — T63.441A LOCAL REACTION TO BEE STING: Primary | ICD-10-CM

## 2024-08-06 PROCEDURE — 99284 EMERGENCY DEPT VISIT MOD MDM: CPT | Performed by: PHYSICIAN ASSISTANT

## 2024-08-06 PROCEDURE — 99281 EMR DPT VST MAYX REQ PHY/QHP: CPT

## 2024-08-06 PROCEDURE — 96374 THER/PROPH/DIAG INJ IV PUSH: CPT

## 2024-08-06 PROCEDURE — 96375 TX/PRO/DX INJ NEW DRUG ADDON: CPT

## 2024-08-06 RX ORDER — CEPHALEXIN 500 MG/1
500 CAPSULE ORAL EVERY 6 HOURS SCHEDULED
Qty: 20 CAPSULE | Refills: 0 | Status: ON HOLD | OUTPATIENT
Start: 2024-08-06 | End: 2024-08-10

## 2024-08-06 RX ORDER — METHYLPREDNISOLONE SODIUM SUCCINATE 125 MG/2ML
60 INJECTION, POWDER, LYOPHILIZED, FOR SOLUTION INTRAMUSCULAR; INTRAVENOUS ONCE
Status: COMPLETED | OUTPATIENT
Start: 2024-08-06 | End: 2024-08-06

## 2024-08-06 RX ORDER — FAMOTIDINE 10 MG/ML
20 INJECTION, SOLUTION INTRAVENOUS ONCE
Status: COMPLETED | OUTPATIENT
Start: 2024-08-06 | End: 2024-08-06

## 2024-08-06 RX ORDER — DIPHENHYDRAMINE HYDROCHLORIDE 50 MG/ML
25 INJECTION INTRAMUSCULAR; INTRAVENOUS ONCE
Status: COMPLETED | OUTPATIENT
Start: 2024-08-06 | End: 2024-08-06

## 2024-08-06 RX ADMIN — FAMOTIDINE 20 MG: 10 INJECTION, SOLUTION INTRAVENOUS at 15:13

## 2024-08-06 RX ADMIN — DIPHENHYDRAMINE HYDROCHLORIDE 25 MG: 50 INJECTION, SOLUTION INTRAMUSCULAR; INTRAVENOUS at 15:13

## 2024-08-06 RX ADMIN — METHYLPREDNISOLONE SODIUM SUCCINATE 60 MG: 125 INJECTION, POWDER, FOR SOLUTION INTRAMUSCULAR; INTRAVENOUS at 15:13

## 2024-08-06 NOTE — ED PROVIDER NOTES
History  Chief Complaint   Patient presents with    Insect Bite     Patient reports being stung by a wasp on her L hand approximately 1 hour ago. Patient took 2 benadryl around 1300.     Patient is a 69-year-old female presenting to the ED for evaluation of a wasp sting to the left hand that occurred approximately 2 hours prior to arrival.  Patient states that she was stung on the left third digit by a wasp around 12:30 PM.  She denies any shortness of breath, difficulty breathing, wheezing, hives/urticaria, swelling of the lips/tongue/throat, throat tightness, dizziness, lightheadedness, syncope, nausea, vomiting or abdominal pain.  She denies any history of anaphylaxis to prior bee stings; however, states that she developed significant redness and swelling throughout her entire arm after a wasp sting a few years ago and had to be admitted to the hospital for IV antibiotics.  Patient states that there is redness and swelling that is spreading up into her hand and she is concerned that this will progress like it did last time. She denies any fevers or chills. She took 2 Benadryl (50mg total) around 1PM.         Prior to Admission Medications   Prescriptions Last Dose Informant Patient Reported? Taking?   Probiotic Product (PROBIOTIC PO)   Yes No   Sig: Take by mouth daily   cholecalciferol (VITAMIN D3) 400 units tablet   Yes No   Sig: Take 400 Units by mouth daily   polyethylene glycol (MIRALAX) 17 g packet   No No   Sig: Take 17 g by mouth 2 (two) times a day      Facility-Administered Medications: None       Past Medical History:   Diagnosis Date    Bell's palsy     Colon polyp     Hypertension     Kidney calculi     Spinal stenosis        Past Surgical History:   Procedure Laterality Date    CHOLECYSTECTOMY      COLONOSCOPY      ENDOMETRIAL ABLATION      LAMINECTOMY  06/14/2019    Lumbar spine - With placement of Trial device nonfusion device       Family History   Problem Relation Age of Onset    Dementia Mother      Heart disease Mother     Emphysema Father     No Known Problems Daughter     No Known Problems Maternal Grandmother     No Known Problems Maternal Grandfather     No Known Problems Paternal Grandmother     No Known Problems Paternal Grandfather     Colon cancer Maternal Uncle     No Known Problems Maternal Uncle     No Known Problems Maternal Uncle     No Known Problems Maternal Uncle     No Known Problems Maternal Uncle     No Known Problems Son     No Known Problems Brother      I have reviewed and agree with the history as documented.    E-Cigarette/Vaping    E-Cigarette Use Never User      E-Cigarette/Vaping Substances     Social History     Tobacco Use    Smoking status: Never    Smokeless tobacco: Never   Vaping Use    Vaping status: Never Used   Substance Use Topics    Alcohol use: Never     Comment: occasionally    Drug use: Never       Review of Systems   Constitutional:  Negative for chills and fever.   HENT:  Negative for congestion, rhinorrhea and sore throat.    Eyes:  Negative for visual disturbance.   Respiratory:  Negative for cough and shortness of breath.    Cardiovascular:  Negative for chest pain, palpitations and leg swelling.   Gastrointestinal:  Negative for abdominal pain, constipation, diarrhea, nausea and vomiting.   Genitourinary:  Negative for dysuria, flank pain, frequency and hematuria.   Musculoskeletal:  Negative for back pain and neck pain.   Skin:  Positive for color change (redness/wasp sting to left hand).   Neurological:  Negative for dizziness, syncope, weakness and headaches.   All other systems reviewed and are negative.      Physical Exam  Physical Exam  Vitals and nursing note reviewed.   Constitutional:       General: She is awake.      Appearance: Normal appearance. She is well-developed. She is not toxic-appearing or diaphoretic.   HENT:      Head: Normocephalic and atraumatic.      Comments: No swelling of the face, lips, tongue or throat.  Uvula midline.  Patient is  tolerating her secretions without difficulty.     Right Ear: External ear normal.      Left Ear: External ear normal.      Nose: Nose normal.      Mouth/Throat:      Lips: Pink.      Mouth: Mucous membranes are moist.   Eyes:      General: Lids are normal. No scleral icterus.     Conjunctiva/sclera: Conjunctivae normal.      Pupils: Pupils are equal, round, and reactive to light.   Cardiovascular:      Rate and Rhythm: Normal rate and regular rhythm.      Pulses: Normal pulses.           Radial pulses are 2+ on the right side and 2+ on the left side.      Heart sounds: Normal heart sounds, S1 normal and S2 normal.   Pulmonary:      Effort: Pulmonary effort is normal. No accessory muscle usage.      Breath sounds: Normal breath sounds. No stridor. No decreased breath sounds, wheezing, rhonchi or rales.      Comments: Lungs clear and equal to auscultation bilaterally.  No wheezing, stridor, tachypnea or respiratory distress.  Abdominal:      General: Abdomen is flat. Bowel sounds are normal. There is no distension.      Palpations: Abdomen is soft.      Tenderness: There is no abdominal tenderness. There is no right CVA tenderness, left CVA tenderness, guarding or rebound.   Musculoskeletal:        Hands:       Cervical back: Full passive range of motion without pain and neck supple. No signs of trauma. No pain with movement.      Right lower leg: No edema.      Left lower leg: No edema.   Lymphadenopathy:      Cervical: No cervical adenopathy.   Skin:     General: Skin is warm and dry.      Capillary Refill: Capillary refill takes less than 2 seconds.      Coloration: Skin is not cyanotic, jaundiced or pale.   Neurological:      Mental Status: She is alert and oriented to person, place, and time.      GCS: GCS eye subscore is 4. GCS verbal subscore is 5. GCS motor subscore is 6.      Gait: Gait normal.   Psychiatric:         Mood and Affect: Mood normal.         Speech: Speech normal.         Behavior: Behavior is  cooperative.         Vital Signs  ED Triage Vitals [08/06/24 1359]   Temperature Pulse Respirations Blood Pressure SpO2   (!) 97.2 °F (36.2 °C) 90 18 155/74 96 %      Temp Source Heart Rate Source Patient Position - Orthostatic VS BP Location FiO2 (%)   Temporal Monitor Sitting Right arm --      Pain Score       5           Vitals:    08/06/24 1359 08/06/24 1400   BP: 155/74 157/74   Pulse: 90 88   Patient Position - Orthostatic VS: Sitting Sitting         Visual Acuity      ED Medications  Medications   methylPREDNISolone sodium succinate (Solu-MEDROL) injection 60 mg (60 mg Intravenous Given 8/6/24 1513)   Famotidine (PF) (PEPCID) injection 20 mg (20 mg Intravenous Given 8/6/24 1513)   diphenhydrAMINE (BENADRYL) injection 25 mg (25 mg Intravenous Given 8/6/24 1513)       Diagnostic Studies  Results Reviewed       None                   No orders to display              Procedures  Procedures         ED Course                                 SBIRT 22yo+      Flowsheet Row Most Recent Value   Initial Alcohol Screen: US AUDIT-C     1. How often do you have a drink containing alcohol? 0 Filed at: 08/06/2024 1359   2. How many drinks containing alcohol do you have on a typical day you are drinking?  0 Filed at: 08/06/2024 1359   3a. Male UNDER 65: How often do you have five or more drinks on one occasion? 0 Filed at: 08/06/2024 1359   3b. FEMALE Any Age, or MALE 65+: How often do you have 4 or more drinks on one occassion? 0 Filed at: 08/06/2024 1359   Audit-C Score 0 Filed at: 08/06/2024 1359   LISBET: How many times in the past year have you...    Used an illegal drug or used a prescription medication for non-medical reasons? Never Filed at: 08/06/2024 1359                      Medical Decision Making  Patient is a 69-year-old female presenting to the ED for evaluation of a wasp sting to the left hand that occurred approximately 2 hours prior to arrival.      Patient has no signs/symptoms of anaphylaxis.  Exam findings  consistent with a large localized reaction to the wasp sting.  She does have erythema/warmth of the hand on exam; however, she is afebrile and history more suggestive of a localized reaction rather than a cellulitis.  She was given IV Benadryl, Solu-Medrol and Pepcid with significant improvement.  On repeat exam, she still has mild swelling over the back of the hand but the erythema/warmth has significantly improved.  Patient is very concerned about developing a soft tissue infection/cellulitis given a history of this in the past.  I did discuss with her that this is low risk; however, will prescribe a course of Keflex in case the redness/warmth should return.  She was encouraged to continue Benadryl q6h and Tylenol/Motrin as needed for pain. She declines PO steroids as she has had GI issues with these in the past. Patient was advised to schedule appointment with her PCP for close follow-up or return to the ED if she develops any new/worsening symptoms.    The management plan was discussed in detail with the patient at bedside and all questions were answered. Strict ED return instructions were discussed at bedside. Prior to discharge, both verbal and written instructions were provided. We discussed the signs and symptoms that should prompt the patient to return to the ED. All questions were answered and the patient was comfortable with the plan of care and discharged home. The patient agrees to return to the Emergency Department for concerns and/or progression of illness.     Risk  Prescription drug management.                 Disposition  Final diagnoses:   Local reaction to bee sting   Sting from hornet, wasp, or bee     Time reflects when diagnosis was documented in both MDM as applicable and the Disposition within this note       Time User Action Codes Description Comment    8/6/2024  4:17 PM Brit Carbajal Add [T63.441A] Local reaction to bee sting     8/6/2024  4:17 PM Brit Carbajal Add [T63.451A,   T63.441A,  T63.461A] Sting from hornet, wasp, or bee           ED Disposition       ED Disposition   Discharge    Condition   Stable    Date/Time   Tue Aug 6, 2024 1617    Comment   Chinmay Curtis discharge to home/self care.                   Follow-up Information       Follow up With Specialties Details Why Contact Info Additional Information    Olaf Maldonado DO Family Medicine Schedule an appointment as soon as possible for a visit   [address] [facility] [address]  [phone]       [facility] Emergency Medicine  If symptoms worsen [address] [facility] [address]  [phone] [facility], [address], [place], [address]            Discharge Medication List as of 8/6/2024  4:19 PM        START taking these medications    Details   cephalexin (KEFLEX) 500 mg capsule Take 1 capsule (500 mg total) by mouth every 6 (six) hours for 5 days, Starting Tue 8/6/2024, Until Sun 8/11/2024, Print           CONTINUE these medications which have NOT CHANGED    Details   cholecalciferol (VITAMIN D3) 400 units tablet Take 400 Units by mouth daily, Historical Med      polyethylene glycol (MIRALAX) 17 g packet Take 17 g by mouth 2 (two) times a day, Starting Wed 7/17/2024, Normal      Probiotic Product (PROBIOTIC PO) Take by mouth daily, Historical Med             No discharge procedures on file.    PDMP Review       None            ED Provider  Electronically Signed by             Brit Carbajal PA-C  08/07/24 7400       Brit Carbajal PA-C  08/07/24 9330

## 2024-08-08 ENCOUNTER — APPOINTMENT (EMERGENCY)
Dept: RADIOLOGY | Facility: HOSPITAL | Age: 69
DRG: 603 | End: 2024-08-08
Payer: MEDICARE

## 2024-08-08 ENCOUNTER — HOSPITAL ENCOUNTER (INPATIENT)
Facility: HOSPITAL | Age: 69
LOS: 1 days | Discharge: HOME | DRG: 603 | End: 2024-08-10
Attending: STUDENT IN AN ORGANIZED HEALTH CARE EDUCATION/TRAINING PROGRAM | Admitting: FAMILY MEDICINE
Payer: MEDICARE

## 2024-08-08 ENCOUNTER — APPOINTMENT (OUTPATIENT)
Dept: CT IMAGING | Facility: HOSPITAL | Age: 69
DRG: 603 | End: 2024-08-08
Payer: MEDICARE

## 2024-08-08 DIAGNOSIS — R22.32 LOCALIZED SWELLING ON LEFT HAND: ICD-10-CM

## 2024-08-08 DIAGNOSIS — T63.441A LOCAL REACTION TO BEE STING: ICD-10-CM

## 2024-08-08 DIAGNOSIS — T63.451A STING FROM HORNET, WASP, OR BEE: ICD-10-CM

## 2024-08-08 DIAGNOSIS — L03.114 CELLULITIS OF LEFT HAND: Primary | ICD-10-CM

## 2024-08-08 DIAGNOSIS — T63.441A BEE STING: ICD-10-CM

## 2024-08-08 DIAGNOSIS — T63.461A STING FROM HORNET, WASP, OR BEE: ICD-10-CM

## 2024-08-08 DIAGNOSIS — T63.441A STING FROM HORNET, WASP, OR BEE: ICD-10-CM

## 2024-08-08 LAB
ALBUMIN SERPL BCG-MCNC: 4.4 G/DL (ref 3.5–5)
ALP SERPL-CCNC: 72 U/L (ref 34–104)
ALT SERPL W P-5'-P-CCNC: 20 U/L (ref 7–52)
ANION GAP SERPL CALCULATED.3IONS-SCNC: 10 MMOL/L (ref 4–13)
AST SERPL W P-5'-P-CCNC: 19 U/L (ref 13–39)
BASOPHILS # BLD AUTO: 0.06 THOUSANDS/ÂΜL (ref 0–0.1)
BASOPHILS NFR BLD AUTO: 1 % (ref 0–1)
BILIRUB SERPL-MCNC: 0.5 MG/DL (ref 0.2–1)
BUN SERPL-MCNC: 26 MG/DL (ref 5–25)
CALCIUM SERPL-MCNC: 10.1 MG/DL (ref 8.4–10.2)
CHLORIDE SERPL-SCNC: 104 MMOL/L (ref 96–108)
CO2 SERPL-SCNC: 26 MMOL/L (ref 21–32)
CREAT SERPL-MCNC: 0.88 MG/DL (ref 0.6–1.3)
EOSINOPHIL # BLD AUTO: 0.46 THOUSAND/ÂΜL (ref 0–0.61)
EOSINOPHIL NFR BLD AUTO: 5 % (ref 0–6)
ERYTHROCYTE [DISTWIDTH] IN BLOOD BY AUTOMATED COUNT: 11.6 % (ref 11.6–15.1)
GFR SERPL CREATININE-BSD FRML MDRD: 67 ML/MIN/1.73SQ M
GLUCOSE SERPL-MCNC: 97 MG/DL (ref 65–140)
HCT VFR BLD AUTO: 47.9 % (ref 34.8–46.1)
HGB BLD-MCNC: 15.4 G/DL (ref 11.5–15.4)
IMM GRANULOCYTES # BLD AUTO: 0.03 THOUSAND/UL (ref 0–0.2)
IMM GRANULOCYTES NFR BLD AUTO: 0 % (ref 0–2)
LACTATE SERPL-SCNC: 1.3 MMOL/L (ref 0.5–2)
LYMPHOCYTES # BLD AUTO: 3.37 THOUSANDS/ÂΜL (ref 0.6–4.47)
LYMPHOCYTES NFR BLD AUTO: 36 % (ref 14–44)
MCH RBC QN AUTO: 30.5 PG (ref 26.8–34.3)
MCHC RBC AUTO-ENTMCNC: 32.2 G/DL (ref 31.4–37.4)
MCV RBC AUTO: 95 FL (ref 82–98)
MONOCYTES # BLD AUTO: 0.72 THOUSAND/ÂΜL (ref 0.17–1.22)
MONOCYTES NFR BLD AUTO: 8 % (ref 4–12)
NEUTROPHILS # BLD AUTO: 4.86 THOUSANDS/ÂΜL (ref 1.85–7.62)
NEUTS SEG NFR BLD AUTO: 50 % (ref 43–75)
NRBC BLD AUTO-RTO: 0 /100 WBCS
PLATELET # BLD AUTO: 264 THOUSANDS/UL (ref 149–390)
PMV BLD AUTO: 9.6 FL (ref 8.9–12.7)
POTASSIUM SERPL-SCNC: 4.2 MMOL/L (ref 3.5–5.3)
PROT SERPL-MCNC: 7.7 G/DL (ref 6.4–8.4)
RBC # BLD AUTO: 5.05 MILLION/UL (ref 3.81–5.12)
SODIUM SERPL-SCNC: 140 MMOL/L (ref 135–147)
WBC # BLD AUTO: 9.5 THOUSAND/UL (ref 4.31–10.16)

## 2024-08-08 PROCEDURE — 99284 EMERGENCY DEPT VISIT MOD MDM: CPT

## 2024-08-08 PROCEDURE — 80053 COMPREHEN METABOLIC PANEL: CPT | Performed by: STUDENT IN AN ORGANIZED HEALTH CARE EDUCATION/TRAINING PROGRAM

## 2024-08-08 PROCEDURE — 73201 CT UPPER EXTREMITY W/DYE: CPT

## 2024-08-08 PROCEDURE — 87040 BLOOD CULTURE FOR BACTERIA: CPT | Performed by: STUDENT IN AN ORGANIZED HEALTH CARE EDUCATION/TRAINING PROGRAM

## 2024-08-08 PROCEDURE — 99222 1ST HOSP IP/OBS MODERATE 55: CPT | Performed by: FAMILY MEDICINE

## 2024-08-08 PROCEDURE — 99285 EMERGENCY DEPT VISIT HI MDM: CPT | Performed by: STUDENT IN AN ORGANIZED HEALTH CARE EDUCATION/TRAINING PROGRAM

## 2024-08-08 PROCEDURE — 73130 X-RAY EXAM OF HAND: CPT

## 2024-08-08 PROCEDURE — 87081 CULTURE SCREEN ONLY: CPT | Performed by: INTERNAL MEDICINE

## 2024-08-08 PROCEDURE — 83605 ASSAY OF LACTIC ACID: CPT | Performed by: STUDENT IN AN ORGANIZED HEALTH CARE EDUCATION/TRAINING PROGRAM

## 2024-08-08 PROCEDURE — 36415 COLL VENOUS BLD VENIPUNCTURE: CPT | Performed by: STUDENT IN AN ORGANIZED HEALTH CARE EDUCATION/TRAINING PROGRAM

## 2024-08-08 PROCEDURE — 85025 COMPLETE CBC W/AUTO DIFF WBC: CPT | Performed by: STUDENT IN AN ORGANIZED HEALTH CARE EDUCATION/TRAINING PROGRAM

## 2024-08-08 RX ORDER — TRIAMCINOLONE ACETONIDE 1 MG/G
CREAM TOPICAL 2 TIMES DAILY
Status: DISCONTINUED | OUTPATIENT
Start: 2024-08-08 | End: 2024-08-08

## 2024-08-08 RX ORDER — ACETAMINOPHEN 325 MG/1
650 TABLET ORAL EVERY 6 HOURS PRN
Status: DISCONTINUED | OUTPATIENT
Start: 2024-08-08 | End: 2024-08-10 | Stop reason: HOSPADM

## 2024-08-08 RX ORDER — DIPHENHYDRAMINE HCL 25 MG
25 TABLET ORAL EVERY 6 HOURS PRN
Status: DISCONTINUED | OUTPATIENT
Start: 2024-08-08 | End: 2024-08-10 | Stop reason: HOSPADM

## 2024-08-08 RX ORDER — DIPHENHYDRAMINE HCL 25 MG
25 TABLET ORAL ONCE
Status: COMPLETED | OUTPATIENT
Start: 2024-08-08 | End: 2024-08-08

## 2024-08-08 RX ADMIN — DIPHENHYDRAMINE HYDROCHLORIDE 25 MG: 25 TABLET ORAL at 21:40

## 2024-08-08 RX ADMIN — DIPHENHYDRAMINE HYDROCHLORIDE 25 MG: 25 TABLET ORAL at 09:22

## 2024-08-08 RX ADMIN — DIPHENHYDRAMINE HYDROCHLORIDE 25 MG: 25 TABLET ORAL at 15:29

## 2024-08-08 RX ADMIN — TRIAMCINOLONE ACETONIDE: 1 CREAM TOPICAL at 09:22

## 2024-08-08 RX ADMIN — VANCOMYCIN HYDROCHLORIDE 1500 MG: 1 INJECTION, POWDER, LYOPHILIZED, FOR SOLUTION INTRAVENOUS at 17:44

## 2024-08-08 RX ADMIN — Medication 2.5 MG: at 19:57

## 2024-08-08 RX ADMIN — VANCOMYCIN HYDROCHLORIDE 1000 MG: 1 INJECTION, POWDER, LYOPHILIZED, FOR SOLUTION INTRAVENOUS at 08:43

## 2024-08-08 NOTE — PROGRESS NOTES
Chinmay Curtis is a 69 y.o. female who is currently ordered Vancomycin IV with management by the Pharmacy Consult service.  Relevant clinical data and objective / subjective history reviewed.  Vancomycin Assessment:  Indication and Goal AUC/Trough: Soft tissue (goal -600, trough >10)  Clinical Status:  New  Micro:     Renal Function:  SCr: 0.88 mg/dL  CrCl: 56 mL/min  Renal replacement: Not on dialysis  Days of Therapy: 1  Current Dose: 1000mg IV q24h  Vancomycin Plan:  New Dosinmg IV q24h  Estimated AUC: 551 mcg*hr/mL  Estimated Trough: 13.6 mcg/mL  Next Level: 8/10/24 with AM labs  Renal Function Monitoring: Daily BMP and UOP  Pharmacy will continue to follow closely for s/sx of nephrotoxicity, infusion reactions and appropriateness of therapy.  BMP and CBC will be ordered per protocol. We will continue to follow the patient’s culture results and clinical progress daily.    Sumeet Dias, Pharmacist

## 2024-08-08 NOTE — ASSESSMENT & PLAN NOTE
Patient presented to the ED with swelling and erythremia of her left upper limb due to a wasp sting on 08/06/2024. Patient denies anaphylactic symptoms and none were observed on PE. Patient denies being allergic or had a hx of anaphylaxis to bee or wasp stings but she has a hx few years back of having cellulitis and being admitted for IV antibiotics for the same reason. Labs show no systemic infection. X ray shows soft tissue swelling no foreign body.     Today the swelling and erythema are less by at least 30-40%, no pain, wrist and hand movement are slightly limited due to the swelling. Ortho consulted. VS normal, CBC normal, Blood cultures including MRSA pending.       CT upper extremity w contrast left   Final Result       1.  Dorsal hand and wrist cellulitis. No evidence of abscess.   2.  There is fluid tracking along the extensor tendon sheath, cannot exclude infectious/septic tenosynovitis. Consider ultrasound evaluation.   3.  No evidence of deep fascial or intramuscular infection.   4.  No evidence of osteomyelitis.       ED Medications  Medications   vancomycin (VANCOCIN) 1000 mg in sodium chloride 0.9% 250 mL IVPB (has no administration in time range)   diphenhydrAMINE (BENADRYL) tablet 25 mg (has no administration in time range)   triamcinolone (KENALOG) 0.1 % cream (has no administration in time range)        Plan    --Follow up on blood culture, MRSA  --IV antibiotics continue vancomycin 1000 mg Q24  --Benadryl 25 mg Q 6  --Follow on Ortho consultation

## 2024-08-08 NOTE — CASE MANAGEMENT
Case Management Progress Note    Patient name Chinmay Curtis  Location RM04/RM04 MRN [id number]  : 1955 Date 2024       LOS (days): 0  Geometric Mean LOS (GMLOS) (days):   Days to GMLOS:        OBJECTIVE:        Current admission status: Observation  Preferred Pharmacy:   [id number]  [address], [address]  [address] [place] [address], [address] [facility] [address]  Phone: [phone] Fax: [phone]    Primary Care Provider: Olaf Maldonado DO    Primary Insurance: MEDICARE  Secondary Insurance: [employer]    PROGRESS NOTE:chart reviewed. No current discharge needs noted. CM to follow.

## 2024-08-08 NOTE — H&P
[facility]  H&P  Name: Chinmay Curtis 69 y.o. female I MRN: [id number]  Unit/Bed#: RM04 I Date of Admission: 8/8/2024   Date of Service: 8/8/2024 I Hospital Day: 0      Assessment & Plan   Cellulitis of left upper extremity  Assessment & Plan  Patient presented to the ED with swelling and erythremia of her left upper limb due to a wasp sting on 08/06/2024. Patient denies anaphylactic symptoms and none were observed on PE. Patient denies being allergic or had a hx of anaphylaxis to bee or wasp stings but she has a hx few years back of having cellulitis and being admitted for IV antibiotics for the same reason. Labs show no systemic infection.     ED Medications  Medications   vancomycin (VANCOCIN) 1000 mg in sodium chloride 0.9% 250 mL IVPB (has no administration in time range)   diphenhydrAMINE (BENADRYL) tablet 25 mg (has no administration in time range)   triamcinolone (KENALOG) 0.1 % cream (has no administration in time range)        Plan    --Observation 24H for any additional delayed allergic reactions, worsening erythema, swelling or level of pain  --IV antibiotics continue vancomycin 1000 mg Q24  --Benadryl 25 mg Q 6  --Topical steroid Q2      VTE Pharmacologic Prophylaxis:   Pharmacologic:  Low risk   Mechanical VTE Prophylaxis in Place: Yes      Discussions with Specialists or Other Care Team Provider: NO    Education and Discussions with Family / Patient: Yes    Time Spent for Care: 45 minutes.  More than 50% of total time spent on counseling and coordination of care as described above.    Current Length of Stay: 0 day(s)    Current Patient Status: Observation   Certification Statement: The patient will continue to require additional inpatient hospital stay due to observation for cellulitis and any potential delayed allergic reaction.     Discharge Plan: 24 hours observation. Expected tomorrow if no worsening symptoms.     Code Status: Level 1 - Full Code      Subjective:  "  Patient was seen in the ED, she is no apparent distress, erythema of upper limb with swelling, no angioedema and no anaphylactic reaction reported.     Objective:     Vitals:   Temp (24hrs), Av.9 °F (36.1 °C), Min:96.9 °F (36.1 °C), Max:96.9 °F (36.1 °C)    Temp:  [96.9 °F (36.1 °C)] 96.9 °F (36.1 °C)  HR:  [69-76] 69  Resp:  [16] 16  BP: (139-144)/(74-79) 144/79  SpO2:  [97 %-98 %] 98 %  There is no height or weight on file to calculate BMI.     HPI    \"Patient is a 69-year-old female presenting to the ED for evaluation of a wasp sting to the left hand that occurred approximately 2 days prior to arrival.  Patient states that she was stung on the left third digit by a wasp around 12:30 PM of the 2024.  She denies any shortness of breath, difficulty breathing, wheezing, hives/urticaria, swelling of the lips/tongue/throat, throat tightness, dizziness, lightheadedness, syncope, nausea, vomiting or abdominal pain.  She denies any history of anaphylaxis to prior bee stings; however, states that she developed significant redness and swelling throughout her entire arm after a wasp sting a few years ago and had to be admitted to the hospital for IV antibiotics.  Patient states that there is redness and swelling that is spreading up into her hand and she is concerned that this will progress like it did last time. She denies any fevers or chills. She took 2 Benadryl (50mg total) around 1PM of the same day of the sting\", patient was prescribed Keflex but she did not take it.        Input and Output Summary (last 24 hours):     No intake or output data in the 24 hours ending 24 1035    Physical Exam:     Physical Exam  Vitals reviewed.   Constitutional:       General: She is not in acute distress.     Appearance: Normal appearance. She is not ill-appearing, toxic-appearing or diaphoretic.   HENT:      Head: Normocephalic and atraumatic.      Nose: Nose normal.      Mouth/Throat:      Mouth: Mucous membranes are " moist.      Pharynx: Oropharynx is clear. No oropharyngeal exudate or posterior oropharyngeal erythema.   Eyes:      Conjunctiva/sclera: Conjunctivae normal.      Pupils: Pupils are equal, round, and reactive to light.   Cardiovascular:      Rate and Rhythm: Normal rate and regular rhythm.      Pulses: Normal pulses.      Heart sounds: Normal heart sounds. No murmur heard.     No friction rub. No gallop.   Pulmonary:      Effort: Pulmonary effort is normal. No respiratory distress.      Breath sounds: Normal breath sounds. No stridor. No wheezing, rhonchi or rales.   Chest:      Chest wall: No tenderness.   Abdominal:      General: Bowel sounds are normal. There is no distension.      Palpations: Abdomen is soft. There is no mass.      Tenderness: There is no abdominal tenderness. There is no right CVA tenderness, left CVA tenderness, guarding or rebound.      Hernia: No hernia is present.   Musculoskeletal:         General: Swelling and tenderness present.      Cervical back: Normal range of motion and neck supple. No rigidity or tenderness.      Right lower leg: No edema.      Left lower leg: No edema.      Comments: Left upper extremity swelling and tenderness with erythema    Lymphadenopathy:      Cervical: No cervical adenopathy.   Skin:     General: Skin is warm.      Capillary Refill: Capillary refill takes less than 2 seconds.      Coloration: Skin is not jaundiced or pale.      Findings: Erythema present. No bruising, lesion or rash.   Neurological:      General: No focal deficit present.      Mental Status: She is alert and oriented to person, place, and time.   Psychiatric:         Mood and Affect: Mood normal.         Behavior: Behavior normal.       Additional Data:     Labs:    Results from last 7 days   Lab Units 08/08/24  0750   WBC Thousand/uL 9.50   HEMOGLOBIN g/dL 15.4   HEMATOCRIT % 47.9*   PLATELETS Thousands/uL 264   SEGS PCT % 50   LYMPHO PCT % 36   MONO PCT % 8   EOS PCT % 5     Results from  last 7 days   Lab Units 08/08/24  0750   SODIUM mmol/L 140   POTASSIUM mmol/L 4.2   CHLORIDE mmol/L 104   CO2 mmol/L 26   BUN mg/dL 26*   CREATININE mg/dL 0.88   ANION GAP mmol/L 10   CALCIUM mg/dL 10.1   ALBUMIN g/dL 4.4   TOTAL BILIRUBIN mg/dL 0.50   ALK PHOS U/L 72   ALT U/L 20   AST U/L 19   GLUCOSE RANDOM mg/dL 97                 Results from last 7 days   Lab Units 08/08/24  0750   LACTIC ACID mmol/L 1.3           * I Have Reviewed All Lab Data Listed Above.  * Additional Pertinent Lab Tests Reviewed: All Labs For Current Hospital Admission Reviewed    Imaging:    XR hand 3+ views LEFT   Final Result      No acute osseous abnormality. Soft tissue swelling without soft tissue air or radiopaque foreign body         Computerized Assisted Algorithm (CAA) may have been used to analyze all applicable images.         Workstation performed: [id number]         CT upper extremity w contrast left    (Results Pending)       Imaging Reports Reviewed by Myself Includes:  Xray shows no bone changes, soft tissue swelling, no foreign body.     Recent Cultures (last 7 days):           Last 24 Hours Medication List:   Current Facility-Administered Medications   Medication Dose Route Frequency Provider Last Rate    diphenhydrAMINE  25 mg Oral Q6H PRN Celia Barrios MD      triamcinolone   Topical BID Rolando Guerrero MD      [START ON 8/9/2024] vancomycin  15 mg/kg Intravenous Q24H Celia Barrios MD          Today, Patient Was Seen By: Celia Barrios MD

## 2024-08-08 NOTE — PLAN OF CARE
Problem: PAIN - ADULT  Goal: Verbalizes/displays adequate comfort level or baseline comfort level  Description: Interventions:  - Encourage patient to monitor pain and request assistance  - Assess pain using appropriate pain scale (0-10)  - Administer analgesics based on type and severity of pain and evaluate response  - Implement non-pharmacological measures as appropriate and evaluate response  - Consider cultural and social influences on pain and pain management  - Notify physician/advanced practitioner if interventions unsuccessful or patient reports new pain  8/8/2024 1624 by Margarito Zabala RN  Outcome: Progressing  8/8/2024 1623 by Margarito Zabala RN  Outcome: Progressing     Problem: INFECTION - ADULT  Goal: Absence or prevention of progression during hospitalization  Description: INTERVENTIONS:  - Assess and monitor for signs and symptoms of infection  - Monitor lab/diagnostic results  - Monitor all insertion sites, i.e. indwelling lines, tubes, and drains  - Monitor endotracheal if appropriate and nasal secretions for changes in amount and color  - [place] appropriate cooling/warming therapies per order  - Administer medications as ordered  - Instruct and encourage patient and family to use good hand hygiene technique  - Identify and instruct in appropriate isolation precautions for identified infection/condition  8/8/2024 1624 by Margarito Zabala RN  Outcome: Progressing  8/8/2024 1623 by Margarito Zabala RN  Outcome: Progressing     Problem: SAFETY ADULT  Goal: Patient will remain free of falls  Description: INTERVENTIONS:  - Educate patient/family on patient safety including physical limitations  - Instruct patient to call for assistance with activity   - Consult OT/PT to assist with strengthening/mobility   - Keep Call bell within reach  - Keep bed low and locked with side rails adjusted as appropriate  - Keep care items and personal belongings within reach  - Initiate and maintain comfort  rounds  - Make Fall Risk Sign visible to staff  - Offer Toileting every 2 Hours, in advance of need  - Initiate/Maintain bed/chair alarm  - Obtain necessary fall risk management equipment: nonskid footwear  - Apply yellow socks and bracelet for high fall risk patients  - Consider moving patient to room near nurses station  8/8/2024 1624 by Margarito Zabala RN  Outcome: Progressing  8/8/2024 1623 by Margarito Zabala RN  Outcome: Progressing  Goal: Maintain or return to baseline ADL function  Description: INTERVENTIONS:  -  Assess patient's ability to carry out ADLs; assess patient's baseline for ADL function and identify physical deficits which impact ability to perform ADLs (bathing, care of mouth/teeth, toileting, grooming, dressing, etc.)  - Assess/evaluate cause of self-care deficits   - Assess range of motion  - Assess patient's mobility; develop plan if impaired  - Assess patient's need for assistive devices and provide as appropriate  - Encourage maximum independence but intervene and supervise when necessary  - Involve family in performance of ADLs  - Assess for home care needs following discharge   - Consider OT consult to assist with ADL evaluation and planning for discharge  - Provide patient education as appropriate  8/8/2024 1624 by Margarito Zabala RN  Outcome: Progressing  8/8/2024 1623 by Margarito Zabala RN  Outcome: Progressing  Goal: Maintains/Returns to pre admission functional level  Description: INTERVENTIONS:  - Perform AM-PAC 6 Click Basic Mobility/ Daily Activity assessment daily.  - Set and communicate daily mobility goal to care team and patient/family/caregiver.   - Collaborate with rehabilitation services on mobility goals if consulted  - Perform Range of Motion 4 times a day.  - Reposition patient every 2 hours.  - Dangle patient 3 times a day  - Stand patient 3 times a day  - Ambulate patient 3 times a day  - Out of bed to chair 3 times a day   - Out of bed for meals 3 times a  day  - Out of bed for toileting  - Record patient progress and toleration of activity level   8/8/2024 1624 by Margarito Zabala RN  Outcome: Progressing  8/8/2024 1623 by Margarito Zabala RN  Outcome: Progressing     Problem: DISCHARGE PLANNING  Goal: Discharge to home or other facility with appropriate resources  Description: INTERVENTIONS:  - Identify barriers to discharge w/patient and caregiver  - Arrange for needed discharge resources and transportation as appropriate  - Identify discharge learning needs (meds, wound care)  - Arrange for interpretive services to assist at discharge as needed  - Refer to Case Management Department for coordinating discharge planning if the patient needs post-hospital services based on physician/advanced practitioner order or complex needs related to functional status, cognitive ability, or social support system  8/8/2024 1624 by Margarito Zabala RN  Outcome: Progressing  8/8/2024 1623 by Margarito Zabala RN  Outcome: Progressing     Problem: Knowledge Deficit  Goal: Patient/family/caregiver demonstrates understanding of disease process, treatment plan, medications, and discharge instructions  Description: Complete learning assessment and assess knowledge base.  Interventions:  - Provide teaching at level of understanding  - Provide teaching via preferred learning methods  8/8/2024 1624 by Margarito Zabala RN  Outcome: Progressing  8/8/2024 1623 by Margarito Zabala RN  Outcome: Progressing     Problem: SKIN/TISSUE INTEGRITY - ADULT  Goal: Incision(s), wounds(s) or drain site(s) healing without S/S of infection  Description: INTERVENTIONS  - Assess and document dressing, incision, wound bed, drain sites and surrounding tissue  - Provide patient and family education  - Perform skin care/dressing changes as ordered  8/8/2024 1624 by Margarito Zabala RN  Outcome: Progressing  8/8/2024 1623 by Margarito Zabala RN  Outcome: Progressing

## 2024-08-08 NOTE — H&P
[facility]  H&P  Name: Chinmay Curtis 69 y.o. female I MRN: [id number]  Unit/Bed#: RM04 I Date of Admission: 8/8/2024   Date of Service: 8/8/2024 I Hospital Day: 0      Assessment & Plan   Cellulitis of left upper extremity  Assessment & Plan  Patient presented to the ED with swelling and erythremia of her left upper limb due to a wasp sting on 08/06/2024. Patient denies anaphylactic symptoms and none were observed on PE. Patient denies being allergic or had a hx of anaphylaxis to bee or wasp stings but she has a hx few years back of having cellulitis and being admitted for IV antibiotics for the same reason. Labs show no systemic infection. X ray shows soft tissue swelling no foreign body.     ED Medications  Medications   vancomycin (VANCOCIN) 1000 mg in sodium chloride 0.9% 250 mL IVPB (has no administration in time range)   diphenhydrAMINE (BENADRYL) tablet 25 mg (has no administration in time range)   triamcinolone (KENALOG) 0.1 % cream (has no administration in time range)        Plan    --Observation 24H for any additional delayed allergic reactions, worsening erythema, swelling or level of pain  --IV antibiotics continue vancomycin 1000 mg Q24  --Benadryl 25 mg Q 6  --Topical steroid Q2  --CT with contrast to check for any vascular compromise or any other missed findings by x ray.           VTE Prophylaxis:  low risk   / sequential compression device   Code Status: Level-1  POLST: There is no POLST form on file for this patient (pre-hospital)  Discussion with family: none    Anticipated Length of Stay:  Patient will be admitted on an Observation basis with an anticipated length of stay of  less than 2 midnights.   Justification for Hospital Stay: IV treatment of cellulitis and observation.     Total Time for Visit, including Counseling / Coordination of Care: 45 minutes.  Greater than 50% of this total time spent on direct patient counseling and coordination of care.    Chief  "Complaint:   Swelling and erythema with pain in the left upper limb    History of Present Illness:      \"Patient is a 69-year-old female presenting to the ED for evaluation of a wasp sting to the left hand that occurred approximately 2 days prior to arrival.  Patient states that she was stung on the left third digit by a wasp around 12:30 PM of the 8/6/2024.  She denies any shortness of breath, difficulty breathing, wheezing, hives/urticaria, swelling of the lips/tongue/throat, throat tightness, dizziness, lightheadedness, syncope, nausea, vomiting or abdominal pain.  She denies any history of anaphylaxis to prior bee stings; however, states that she developed significant redness and swelling throughout her entire arm after a wasp sting a few years ago and had to be admitted to the hospital for IV antibiotics.  Patient states that there is redness and swelling that is spreading up into her hand and she is concerned that this will progress like it did last time. She denies any fevers or chills. She took 2 Benadryl (50mg total) around 1PM of the same day of the sting\", patient was prescribed Keflex but she did not take it.     Review of Systems:    Review of Systems   Constitutional: Negative.  Negative for chills, fatigue and fever.   HENT: Negative.  Negative for congestion, facial swelling, rhinorrhea, sneezing, sore throat, trouble swallowing and voice change.    Eyes: Negative.  Negative for photophobia, pain, discharge, redness, itching and visual disturbance.   Respiratory: Negative.  Negative for apnea, cough, choking, chest tightness, shortness of breath, wheezing and stridor.    Cardiovascular: Negative.  Negative for chest pain, palpitations and leg swelling.   Gastrointestinal: Negative.  Negative for abdominal distention, abdominal pain, anal bleeding, blood in stool, constipation, diarrhea, nausea, rectal pain and vomiting.   Endocrine: Negative.    Genitourinary: Negative.    Musculoskeletal: Negative.  " Negative for arthralgias, joint swelling, myalgias, neck pain and neck stiffness.   Skin:  Positive for color change. Negative for rash.   Allergic/Immunologic: Positive for environmental allergies.        Patient is allergic to bee sting and wasp   Neurological:  Negative for dizziness, tremors, seizures, syncope, facial asymmetry, speech difficulty, weakness, light-headedness, numbness and headaches.   Psychiatric/Behavioral: Negative.  Negative for agitation, behavioral problems, confusion, decreased concentration and dysphoric mood. The patient is not nervous/anxious and is not hyperactive.    All other systems reviewed and are negative.      Past Medical and Surgical History:     Past Medical History:   Diagnosis Date    Bell's palsy     Colon polyp     Hypertension     Kidney calculi     Spinal stenosis        Past Surgical History:   Procedure Laterality Date    CHOLECYSTECTOMY      COLONOSCOPY      ENDOMETRIAL ABLATION      LAMINECTOMY  06/14/2019    Lumbar spine - With placement of Trial device nonfusion device       Meds/Allergies:    Prior to Admission medications    Medication Sig Start Date End Date Taking? Authorizing Provider   cholecalciferol (VITAMIN D3) 400 units tablet Take 400 Units by mouth daily   Yes Historical Provider, MD   polyethylene glycol (MIRALAX) 17 g packet Take 17 g by mouth 2 (two) times a day 7/17/24  Yes PIA Heath   Probiotic Product (PROBIOTIC PO) Take by mouth daily   Yes Historical Provider, MD   cephalexin (KEFLEX) 500 mg capsule Take 1 capsule (500 mg total) by mouth every 6 (six) hours for 5 days  Patient not taking: Reported on 8/8/2024 8/6/24 8/11/24  Brit Carbajal PA-C     I have reviewed home medications with patient personally.    Allergies:   Allergies   Allergen Reactions    Tramadol Vomiting    Ciprofloxacin Other (See Comments)     unknown    Lisinopril      Pt had one dose and became became presyncopal    Prednisone GI Intolerance       Social  History:     Marital Status: /Civil Union   Occupation: unknown  Patient Pre-hospital Living Situation: Unknown  Patient Pre-hospital Level of Mobility: Mobile  Patient Pre-hospital Diet Restrictions: regular   Substance Use History:   Social History     Substance and Sexual Activity   Alcohol Use Never    Comment: occasionally     Social History     Tobacco Use   Smoking Status Never   Smokeless Tobacco Never     Social History     Substance and Sexual Activity   Drug Use Never       Family History:    non-contributory    Physical Exam:     Vitals:   Blood Pressure: 144/79 (08/08/24 0930)  Pulse: 69 (08/08/24 0930)  Temperature: (!) 96.9 °F (36.1 °C) (08/08/24 0737)  Temp Source: Temporal (08/08/24 0737)  Respirations: 16 (08/08/24 0930)  SpO2: 98 % (08/08/24 0930)      Physical Exam  Vitals reviewed.   Constitutional:       General: She is not in acute distress.     Appearance: Normal appearance. She is not ill-appearing, toxic-appearing or diaphoretic.   HENT:      Head: Normocephalic and atraumatic.      Nose: Nose normal.      Mouth/Throat:      Mouth: Mucous membranes are moist.      Pharynx: Oropharynx is clear. No oropharyngeal exudate or posterior oropharyngeal erythema.   Eyes:      Conjunctiva/sclera: Conjunctivae normal.      Pupils: Pupils are equal, round, and reactive to light.   Cardiovascular:      Rate and Rhythm: Normal rate and regular rhythm.      Pulses: Normal pulses.      Heart sounds: Normal heart sounds. No murmur heard.     No friction rub. No gallop.   Pulmonary:      Effort: Pulmonary effort is normal. No respiratory distress.      Breath sounds: Normal breath sounds. No stridor. No wheezing, rhonchi or rales.   Chest:      Chest wall: No tenderness.   Abdominal:      General: Bowel sounds are normal. There is no distension.      Palpations: Abdomen is soft. There is no mass.      Tenderness: There is no abdominal tenderness. There is no right CVA tenderness, left CVA tenderness,  guarding or rebound.      Hernia: No hernia is present.   Musculoskeletal:         General: Swelling and tenderness present.      Cervical back: Normal range of motion and neck supple. No rigidity or tenderness.      Right lower leg: No edema.      Left lower leg: No edema.      Comments: Left upper extremity swelling and tenderness with erythema    Lymphadenopathy:      Cervical: No cervical adenopathy.   Skin:     General: Skin is warm.      Capillary Refill: Capillary refill takes less than 2 seconds.      Coloration: Skin is not jaundiced or pale.      Findings: Erythema present. No bruising, lesion or rash.   Neurological:      General: No focal deficit present.      Mental Status: She is alert and oriented to person, place, and time.   Psychiatric:         Mood and Affect: Mood normal.         Behavior: Behavior normal.     Additional Data:     Lab Results: I have personally reviewed pertinent reports.      Results from last 7 days   Lab Units 08/08/24  0750   WBC Thousand/uL 9.50   HEMOGLOBIN g/dL 15.4   HEMATOCRIT % 47.9*   PLATELETS Thousands/uL 264   SEGS PCT % 50   LYMPHO PCT % 36   MONO PCT % 8   EOS PCT % 5     Results from last 7 days   Lab Units 08/08/24  0750   SODIUM mmol/L 140   POTASSIUM mmol/L 4.2   CHLORIDE mmol/L 104   CO2 mmol/L 26   BUN mg/dL 26*   CREATININE mg/dL 0.88   ANION GAP mmol/L 10   CALCIUM mg/dL 10.1   ALBUMIN g/dL 4.4   TOTAL BILIRUBIN mg/dL 0.50   ALK PHOS U/L 72   ALT U/L 20   AST U/L 19   GLUCOSE RANDOM mg/dL 97                 Results from last 7 days   Lab Units 08/08/24  0750   LACTIC ACID mmol/L 1.3       Imaging: I have personally reviewed pertinent reports.      XR hand 3+ views LEFT   Final Result by Yo Crenshaw MD (08/08 0902)      No acute osseous abnormality. Soft tissue swelling without soft tissue air or radiopaque foreign body         Computerized Assisted Algorithm (CAA) may have been used to analyze all applicable images.         Workstation performed:  [id number]         CT upper extremity w contrast left    (Results Pending)       EKG, Pathology, and Other Studies Reviewed on Admission:   EKG: none, X ray of upper limb shows swelling of soft tissue but no foreign object.     Celia Barrios MD

## 2024-08-08 NOTE — ED PROVIDER NOTES
History  Chief Complaint   Patient presents with    Hand Swelling     Pt c/o left hand swelling over the past two days. Pt states she was here two days ago for bee sting and states swelling has worsened. Pt states she has not started the abx.      HPI this a 69-year-old female presents to the emergency department for concerns of infection of the left hand.  Patient was here approximately 2 days ago where she presented after receiving a bee sting to the left dorsal aspect of the hand.  At that time she was evaluated advised her she had the start of some mild cellulitis and was prescribed Keflex and discharged home.  Unfortunately patient reports she has not been taking antibiotic as prescribed and the left hand has gotten progressively more swollen painful and tender to the touch.  Patient now reports that she is unable to partake in daily activities due to the swelling of the left hand.  Patient is right-hand dominant denies any systemic symptoms no fevers chills nausea vomiting.  Patient reports taking no OTC medications    Prior to Admission Medications   Prescriptions Last Dose Informant Patient Reported? Taking?   Probiotic Product (PROBIOTIC PO) 8/8/2024  Yes Yes   Sig: Take by mouth daily   cephalexin (KEFLEX) 500 mg capsule Not Taking  No No   Sig: Take 1 capsule (500 mg total) by mouth every 6 (six) hours for 5 days   Patient not taking: Reported on 8/8/2024   cholecalciferol (VITAMIN D3) 400 units tablet 8/8/2024  Yes Yes   Sig: Take 400 Units by mouth daily   polyethylene glycol (MIRALAX) 17 g packet 8/8/2024  No Yes   Sig: Take 17 g by mouth 2 (two) times a day      Facility-Administered Medications: None       Past Medical History:   Diagnosis Date    Bell's palsy     Colon polyp     Hypertension     Kidney calculi     Spinal stenosis        Past Surgical History:   Procedure Laterality Date    CHOLECYSTECTOMY      COLONOSCOPY      ENDOMETRIAL ABLATION      LAMINECTOMY  06/14/2019    Lumbar spine - With  placement of Trial device nonfusion device       Family History   Problem Relation Age of Onset    Dementia Mother     Heart disease Mother     Emphysema Father     No Known Problems Daughter     No Known Problems Maternal Grandmother     No Known Problems Maternal Grandfather     No Known Problems Paternal Grandmother     No Known Problems Paternal Grandfather     Colon cancer Maternal Uncle     No Known Problems Maternal Uncle     No Known Problems Maternal Uncle     No Known Problems Maternal Uncle     No Known Problems Maternal Uncle     No Known Problems Son     No Known Problems Brother      I have reviewed and agree with the history as documented.    E-Cigarette/Vaping    E-Cigarette Use Never User      E-Cigarette/Vaping Substances     Social History     Tobacco Use    Smoking status: Never    Smokeless tobacco: Never   Vaping Use    Vaping status: Never Used   Substance Use Topics    Alcohol use: Never     Comment: occasionally    Drug use: Never       Review of Systems   Constitutional:  Negative for activity change, appetite change, chills, fatigue and fever.   HENT:  Negative for congestion, dental problem, drooling, ear discharge, ear pain, facial swelling, postnasal drip, rhinorrhea and sinus pain.    Eyes:  Negative for photophobia, pain, discharge and itching.   Respiratory:  Negative for apnea, cough, chest tightness and shortness of breath.    Cardiovascular:  Negative for chest pain and leg swelling.   Gastrointestinal:  Negative for abdominal distention, abdominal pain, anal bleeding, constipation, diarrhea and nausea.   Endocrine: Negative for cold intolerance, heat intolerance and polydipsia.   Genitourinary:  Negative for difficulty urinating.   Musculoskeletal:  Negative for arthralgias, gait problem, joint swelling and myalgias.   Skin:  Positive for rash and wound. Negative for color change and pallor.   Allergic/Immunologic: Negative for immunocompromised state.   Neurological:  Negative  for dizziness, seizures, facial asymmetry, weakness, light-headedness, numbness and headaches.   Psychiatric/Behavioral:  Negative for agitation, behavioral problems, confusion, decreased concentration and dysphoric mood.    All other systems reviewed and are negative.      Physical Exam  Physical Exam  Vitals and nursing note reviewed.   Constitutional:       General: She is not in acute distress.     Appearance: She is well-developed.   HENT:      Head: Normocephalic and atraumatic.   Eyes:      Conjunctiva/sclera: Conjunctivae normal.      Pupils: Pupils are equal, round, and reactive to light.   Cardiovascular:      Rate and Rhythm: Normal rate and regular rhythm.      Heart sounds: Normal heart sounds. No murmur heard.     No friction rub.   Pulmonary:      Effort: Pulmonary effort is normal.      Breath sounds: Normal breath sounds.   Abdominal:      General: Bowel sounds are normal.      Palpations: Abdomen is soft.   Musculoskeletal:         General: Normal range of motion.      Cervical back: Normal range of motion and neck supple.      Comments: Pt right hand dominant.        Left hand: significant swelling, erythema and TTP left hand to the left mid forarm. Intact distal/proiximal pulses, limited ROM due to swelling, no palpable abscess      Skin:     General: Skin is warm.      Capillary Refill: Capillary refill takes less than 2 seconds.   Neurological:      Mental Status: She is alert and oriented to person, place, and time.      Motor: No abnormal muscle tone.      Coordination: Coordination normal.   Psychiatric:         Behavior: Behavior normal.         Thought Content: Thought content normal.         Vital Signs  ED Triage Vitals   Temperature Pulse Respirations Blood Pressure SpO2   08/08/24 0737 08/08/24 0737 08/08/24 0734 08/08/24 0747 08/08/24 0737   (!) 96.9 °F (36.1 °C) 76 16 141/77 97 %      Temp Source Heart Rate Source Patient Position - Orthostatic VS BP Location FiO2 (%)   08/08/24 0737  08/08/24 0737 08/08/24 0747 08/08/24 0747 --   Temporal Monitor Sitting Right arm       Pain Score       08/08/24 0734       8           Vitals:    08/08/24 0737 08/08/24 0747   BP:  141/77   Pulse: 76    Patient Position - Orthostatic VS:  Sitting         Visual Acuity      ED Medications  Medications   vancomycin (VANCOCIN) 1000 mg in sodium chloride 0.9% 250 mL IVPB (has no administration in time range)   diphenhydrAMINE (BENADRYL) tablet 25 mg (has no administration in time range)   triamcinolone (KENALOG) 0.1 % cream (has no administration in time range)       Diagnostic Studies  Results Reviewed       Procedure Component Value Units Date/Time    MRSA culture [[id number]]     Lab Status: No result Specimen: Nares from Nose     Comprehensive metabolic panel [[id number]]  (Abnormal) Collected: 08/08/24 0750    Lab Status: Final result Specimen: Blood from Arm, Right Updated: 08/08/24 0825     Sodium 140 mmol/L      Potassium 4.2 mmol/L      Chloride 104 mmol/L      CO2 26 mmol/L      ANION GAP 10 mmol/L      BUN 26 mg/dL      Creatinine 0.88 mg/dL      Glucose 97 mg/dL      Calcium 10.1 mg/dL      AST 19 U/L      ALT 20 U/L      Alkaline Phosphatase 72 U/L      Total Protein 7.7 g/dL      Albumin 4.4 g/dL      Total Bilirubin 0.50 mg/dL      eGFR 67 ml/min/1.73sq m     Narrative:      National Kid[employer] guidelines for Chronic Kidney Disease (CKD):     Stage 1 with normal or high GFR (GFR > 90 mL/min/1.73 square meters)    Stage 2 Mild CKD (GFR = 60-89 mL/min/1.73 square meters)    Stage 3A Moderate CKD (GFR = 45-59 mL/min/1.73 square meters)    Stage 3B Moderate CKD (GFR = 30-44 mL/min/1.73 square meters)    Stage 4 Severe CKD (GFR = 15-29 mL/min/1.73 square meters)    Stage 5 End Stage CKD (GFR <15 mL/min/1.73 square meters)  Note: GFR calculation is accurate only with a steady state creatinine    Lactic acid, plasma (w/reflex if result > 2.0) [[id number]]  (Normal) Collected: 08/08/24 0750    Lab  Status: Final result Specimen: Blood from Arm, Right Updated: 08/08/24 0824     LACTIC ACID 1.3 mmol/L     Narrative:      Result may be elevated if tourniquet was used during collection.    CBC and differential [[id number]]  (Abnormal) Collected: 08/08/24 0750    Lab Status: Final result Specimen: Blood from Arm, Right Updated: 08/08/24 0801     WBC 9.50 Thousand/uL      RBC 5.05 Million/uL      Hemoglobin 15.4 g/dL      Hematocrit 47.9 %      MCV 95 fL      MCH 30.5 pg      MCHC 32.2 g/dL      RDW 11.6 %      MPV 9.6 fL      Platelets 264 Thousands/uL      nRBC 0 /100 WBCs      Segmented % 50 %      Immature Grans % 0 %      Lymphocytes % 36 %      Monocytes % 8 %      Eosinophils Relative 5 %      Basophils Relative 1 %      Absolute Neutrophils 4.86 Thousands/µL      Absolute Immature Grans 0.03 Thousand/uL      Absolute Lymphocytes 3.37 Thousands/µL      Absolute Monocytes 0.72 Thousand/µL      Eosinophils Absolute 0.46 Thousand/µL      Basophils Absolute 0.06 Thousands/µL     Blood culture #2 [[id number]] Collected: 08/08/24 0751    Lab Status: In process Specimen: Blood from Hand, Right Updated: 08/08/24 0757    Blood culture #1 [[id number]] Collected: 08/08/24 0751    Lab Status: In process Specimen: Blood from Arm, Right Updated: 08/08/24 0757                   XR hand 3+ views LEFT    (Results Pending)              Procedures  Procedures         ED Course  ED Course as of 08/08/24 0842   Thu Aug 08, 2024   0808 X-ray reviewed by myself no acute osseous injury however there is some significant soft tissue swelling noted in the dorsal aspect of the hand which is consistent with where concerning area of cellulitis is.   0830 Secure chat to hospital physician to discuss potential admission for cellulitis.   0836 Spoke to Dr. Upton of internal medicine.  He is requesting I run the case past orthopedic surgery to see if they would like a CAT scan of the wrist.  Orthopedic surgery was added to our group message.    0840 Hospitalist and orthopedic surgery in agreement with admission.                                 SBIRT 22yo+      Flowsheet Row Most Recent Value   Initial Alcohol Screen: US AUDIT-C     1. How often do you have a drink containing alcohol? 0 Filed at: 08/08/2024 0737   2. How many drinks containing alcohol do you have on a typical day you are drinking?  0 Filed at: 08/08/2024 0737   3a. Male UNDER 65: How often do you have five or more drinks on one occasion? 0 Filed at: 08/08/2024 0737   3b. FEMALE Any Age, or MALE 65+: How often do you have 4 or more drinks on one occassion? 0 Filed at: 08/08/2024 0737   Audit-C Score 0 Filed at: 08/08/2024 0737                      Medical Decision Making  Problems Addressed:  Bee sting: acute illness or injury  Cellulitis of left hand: acute illness or injury  Localized swelling on left hand: acute illness or injury    Amount and/or Complexity of Data Reviewed  Labs: ordered. Decision-making details documented in ED Course.  Radiology: ordered. Decision-making details documented in ED Course.  ECG/medicine tests:  Decision-making details documented in ED Course.    Risk  Decision regarding hospitalization.                 Disposition  Final diagnoses:   Cellulitis of left hand   Localized swelling on left hand   Bee sting     Time reflects when diagnosis was documented in both MDM as applicable and the Disposition within this note       Time User Action Codes Description Comment    8/8/2024  7:55 AM Rolnado Guerrero [L03.114] Cellulitis of left hand     8/8/2024  7:55 AM Rolando Guerrero [R22.32] Localized swelling on left hand     8/8/2024  7:55 AM Rolando Guerrero [T63.441A] Bee sting           ED Disposition       ED Disposition   Admit    Condition   Stable    Date/Time   Thu Aug 8, 2024 0840    Comment   --             Follow-up Information    None         Patient's Medications   Discharge Prescriptions    No medications on file       No discharge procedures on  file.    PDMP Review       None            ED Provider  Electronically Signed by             Rolando Guerrero MD  08/08/24 0875

## 2024-08-08 NOTE — ASSESSMENT & PLAN NOTE
Patient presented to the ED with swelling and erythremia of her left upper limb due to a wasp sting on 08/06/2024. Patient denies anaphylactic symptoms and none were observed on PE. Patient denies being allergic or had a hx of anaphylaxis to bee or wasp stings but she has a hx few years back of having cellulitis and being admitted for IV antibiotics for the same reason. Labs show no systemic infection.     ED Medications  Medications   vancomycin (VANCOCIN) 1000 mg in sodium chloride 0.9% 250 mL IVPB (has no administration in time range)   diphenhydrAMINE (BENADRYL) tablet 25 mg (has no administration in time range)   triamcinolone (KENALOG) 0.1 % cream (has no administration in time range)        Plan    --Observation 24H for any additional delayed allergic reactions, worsening erythema, swelling or level of pain  --IV antibiotics continue vancomycin 1000 mg Q24  --Benadryl 25 mg Q 6  --Topical steroid Q2

## 2024-08-09 VITALS
BODY MASS INDEX: 30.26 KG/M2 | DIASTOLIC BLOOD PRESSURE: 69 MMHG | WEIGHT: 164.46 LBS | HEART RATE: 73 BPM | TEMPERATURE: 97.8 F | OXYGEN SATURATION: 95 % | SYSTOLIC BLOOD PRESSURE: 120 MMHG | HEIGHT: 62 IN | RESPIRATION RATE: 18 BRPM

## 2024-08-09 LAB
BASOPHILS # BLD AUTO: 0.05 THOUSANDS/ÂΜL (ref 0–0.1)
BASOPHILS NFR BLD AUTO: 1 % (ref 0–1)
EOSINOPHIL # BLD AUTO: 0.5 THOUSAND/ÂΜL (ref 0–0.61)
EOSINOPHIL NFR BLD AUTO: 6 % (ref 0–6)
ERYTHROCYTE [DISTWIDTH] IN BLOOD BY AUTOMATED COUNT: 11.6 % (ref 11.6–15.1)
HCT VFR BLD AUTO: 45.5 % (ref 34.8–46.1)
HGB BLD-MCNC: 15.2 G/DL (ref 11.5–15.4)
IMM GRANULOCYTES # BLD AUTO: 0.03 THOUSAND/UL (ref 0–0.2)
IMM GRANULOCYTES NFR BLD AUTO: 0 % (ref 0–2)
LYMPHOCYTES # BLD AUTO: 2.79 THOUSANDS/ÂΜL (ref 0.6–4.47)
LYMPHOCYTES NFR BLD AUTO: 34 % (ref 14–44)
MCH RBC QN AUTO: 30.6 PG (ref 26.8–34.3)
MCHC RBC AUTO-ENTMCNC: 33.4 G/DL (ref 31.4–37.4)
MCV RBC AUTO: 92 FL (ref 82–98)
MONOCYTES # BLD AUTO: 0.9 THOUSAND/ÂΜL (ref 0.17–1.22)
MONOCYTES NFR BLD AUTO: 11 % (ref 4–12)
MRSA NOSE QL CULT: NORMAL
NEUTROPHILS # BLD AUTO: 4.04 THOUSANDS/ÂΜL (ref 1.85–7.62)
NEUTS SEG NFR BLD AUTO: 48 % (ref 43–75)
NRBC BLD AUTO-RTO: 0 /100 WBCS
PLATELET # BLD AUTO: 278 THOUSANDS/UL (ref 149–390)
PMV BLD AUTO: 9.3 FL (ref 8.9–12.7)
RBC # BLD AUTO: 4.97 MILLION/UL (ref 3.81–5.12)
WBC # BLD AUTO: 8.31 THOUSAND/UL (ref 4.31–10.16)

## 2024-08-09 PROCEDURE — 99231 SBSQ HOSP IP/OBS SF/LOW 25: CPT | Performed by: FAMILY MEDICINE

## 2024-08-09 PROCEDURE — 99222 1ST HOSP IP/OBS MODERATE 55: CPT | Performed by: STUDENT IN AN ORGANIZED HEALTH CARE EDUCATION/TRAINING PROGRAM

## 2024-08-09 PROCEDURE — 85025 COMPLETE CBC W/AUTO DIFF WBC: CPT | Performed by: FAMILY MEDICINE

## 2024-08-09 RX ADMIN — DIPHENHYDRAMINE HYDROCHLORIDE 25 MG: 25 TABLET ORAL at 19:47

## 2024-08-09 RX ADMIN — DIPHENHYDRAMINE HYDROCHLORIDE 25 MG: 25 TABLET ORAL at 04:59

## 2024-08-09 RX ADMIN — VANCOMYCIN HYDROCHLORIDE 1500 MG: 1 INJECTION, POWDER, LYOPHILIZED, FOR SOLUTION INTRAVENOUS at 17:32

## 2024-08-09 RX ADMIN — DIPHENHYDRAMINE HYDROCHLORIDE 25 MG: 25 TABLET ORAL at 11:53

## 2024-08-09 NOTE — PROGRESS NOTES
Chinmay Curtis is a 69 y.o. female who is currently ordered Vancomycin IV with management by the Pharmacy Consult service.  Relevant clinical data and objective / subjective history reviewed.  Vancomycin Assessment:  Indication and Goal AUC/Trough: Soft tissue (goal -600, trough >10)  Clinical Status: stable  Micro:     Renal Function:  SCr: 0.88 mg/dL  CrCl: 56 mL/min  Renal replacement: Not on dialysis  Days of Therapy: 2  Current Dose: 1500mg IV q24h  Vancomycin Plan:  New Dosing: same  Estimated AUC: 558 mcg*hr/mL  Estimated Trough: 14.1 mcg/mL  Next Level: 8/10/24 with AM labs  Renal Function Monitoring: Daily BMP and UOP  Pharmacy will continue to follow closely for s/sx of nephrotoxicity, infusion reactions and appropriateness of therapy.  BMP and CBC will be ordered per protocol. We will continue to follow the patient’s culture results and clinical progress daily.    Sumeet Dias, Pharmacist

## 2024-08-09 NOTE — PROGRESS NOTES
[facility]  Progress Note  Name: Chinmay Curtis I  MRN: [id number]  Unit/Bed#: 403-01 I Date of Admission: 8/8/2024   Date of Service: 8/9/2024 I Hospital Day: 0    Assessment & Plan   Cellulitis of left upper extremity  Assessment & Plan  Patient presented to the ED with swelling and erythremia of her left upper limb due to a wasp sting on 08/06/2024. Patient denies anaphylactic symptoms and none were observed on PE. Patient denies being allergic or had a hx of anaphylaxis to bee or wasp stings but she has a hx few years back of having cellulitis and being admitted for IV antibiotics for the same reason. Labs show no systemic infection. X ray shows soft tissue swelling no foreign body.     Today the swelling and erythema are less by at least 30-40%, no pain, wrist and hand movement are slightly limited due to the swelling. Ortho consulted. VS normal, CBC normal, Blood cultures including MRSA pending.       CT upper extremity w contrast left   Final Result       1.  Dorsal hand and wrist cellulitis. No evidence of abscess.   2.  There is fluid tracking along the extensor tendon sheath, cannot exclude infectious/septic tenosynovitis. Consider ultrasound evaluation.   3.  No evidence of deep fascial or intramuscular infection.   4.  No evidence of osteomyelitis.       ED Medications  Medications   vancomycin (VANCOCIN) 1000 mg in sodium chloride 0.9% 250 mL IVPB (has no administration in time range)   diphenhydrAMINE (BENADRYL) tablet 25 mg (has no administration in time range)   triamcinolone (KENALOG) 0.1 % cream (has no administration in time range)        Plan    --Follow up on blood culture, MRSA  --IV antibiotics continue vancomycin 1000 mg Q24  --Benadryl 25 mg Q 6  --Follow on Ortho consultation              VTE Pharmacologic Prophylaxis:   Pharmacologic:  low risk  Mechanical VTE Prophylaxis in Place: Yes      Discussions with Specialists or Other Care Team Provider: ortho consulted      Education and Discussions with Family / Patient: Yes, explained results to patients including radiology results. Discussed the need to stay longer for continuous treatment, ortho consult and observation.     Time Spent for Care: 30 minutes.  More than 50% of total time spent on counseling and coordination of care as described above.    Current Length of Stay: 0 day(s)    Current Patient Status: Observation   Certification Statement: The patient will continue to require additional inpatient hospital stay due to continuous management of hand and wrist cellulitis extending to lower arm.     Discharge Plan: None as of this note    Code Status: Level 1 - Full Code      Subjective:   Patient feels better, patient reported no pain but she observed some limitation in flex ation of her digits and wrist due to the swelling, patient reported no pain, no other symptoms.     Objective:     Vitals:   Temp (24hrs), Av.5 °F (36.4 °C), Min:97.3 °F (36.3 °C), Max:97.8 °F (36.6 °C)    Temp:  [97.3 °F (36.3 °C)-97.8 °F (36.6 °C)] 97.8 °F (36.6 °C)  HR:  [66-77] 76  Resp:  [17-18] 18  BP: (129-141)/(60-83) 129/79  SpO2:  [93 %-98 %] 94 %  Body mass index is 30.08 kg/m².     Input and Output Summary (last 24 hours):       Intake/Output Summary (Last 24 hours) at 2024 0932  Last data filed at 2024 0859  Gross per 24 hour   Intake 540 ml   Output --   Net 540 ml       Physical Exam:     Physical Exam  Vitals and nursing note reviewed.   Constitutional:       General: She is not in acute distress.     Appearance: Normal appearance. She is not ill-appearing, toxic-appearing or diaphoretic.   HENT:      Head: Normocephalic and atraumatic.      Mouth/Throat:      Mouth: Mucous membranes are moist.      Pharynx: Oropharynx is clear.   Eyes:      Conjunctiva/sclera: Conjunctivae normal.   Neck:      Vascular: No carotid bruit.   Cardiovascular:      Rate and Rhythm: Normal rate and regular rhythm.      Pulses: Normal pulses.       Heart sounds: Normal heart sounds. No murmur heard.     No friction rub. No gallop.   Pulmonary:      Effort: Pulmonary effort is normal. No respiratory distress.      Breath sounds: Normal breath sounds. No stridor. No wheezing, rhonchi or rales.   Chest:      Chest wall: No tenderness.   Abdominal:      General: Bowel sounds are normal. There is no distension.      Palpations: Abdomen is soft.      Tenderness: There is no abdominal tenderness. There is no right CVA tenderness, left CVA tenderness, guarding or rebound.   Musculoskeletal:         General: No swelling, tenderness, deformity or signs of injury. Normal range of motion.      Cervical back: Normal range of motion and neck supple. No rigidity or tenderness.      Right lower leg: No edema.      Left lower leg: No edema.   Lymphadenopathy:      Cervical: No cervical adenopathy.   Skin:     General: Skin is warm and dry.      Capillary Refill: Capillary refill takes less than 2 seconds.      Coloration: Skin is not jaundiced or pale.      Findings: No bruising, erythema, lesion or rash.   Neurological:      General: No focal deficit present.      Mental Status: She is alert and oriented to person, place, and time.   Psychiatric:         Mood and Affect: Mood normal.         Behavior: Behavior normal.         Thought Content: Thought content normal.         Judgment: Judgment normal.       Additional Data:     Labs:    Results from last 7 days   Lab Units 08/09/24  0504   WBC Thousand/uL 8.31   HEMOGLOBIN g/dL 15.2   HEMATOCRIT % 45.5   PLATELETS Thousands/uL 278   SEGS PCT % 48   LYMPHO PCT % 34   MONO PCT % 11   EOS PCT % 6     Results from last 7 days   Lab Units 08/08/24  0750   SODIUM mmol/L 140   POTASSIUM mmol/L 4.2   CHLORIDE mmol/L 104   CO2 mmol/L 26   BUN mg/dL 26*   CREATININE mg/dL 0.88   ANION GAP mmol/L 10   CALCIUM mg/dL 10.1   ALBUMIN g/dL 4.4   TOTAL BILIRUBIN mg/dL 0.50   ALK PHOS U/L 72   ALT U/L 20   AST U/L 19   GLUCOSE RANDOM mg/dL 97                  Results from last 7 days   Lab Units 08/08/24  0750   LACTIC ACID mmol/L 1.3           * I Have Reviewed All Lab Data Listed Above.  * Additional Pertinent Lab Tests Reviewed: All Labs For Current Hospital Admission Reviewed    Imaging:    CT upper extremity w contrast left   Final Result      1.  Dorsal hand and wrist cellulitis. No evidence of abscess.   2.  There is fluid tracking along the extensor tendon sheath, cannot exclude infectious/septic tenosynovitis. Consider ultrasound evaluation.   3.  No evidence of deep fascial or intramuscular infection.   4.  No evidence of osteomyelitis.         Workstation performed: [id number]         XR hand 3+ views LEFT   Final Result      No acute osseous abnormality. Soft tissue swelling without soft tissue air or radiopaque foreign body         Computerized Assisted Algorithm (CAA) may have been used to analyze all applicable images.         Workstation performed: [id number]                 Imaging Reports Reviewed by Myself Includes:      CT upper extremity w contrast left   Final Result       1.  Dorsal hand and wrist cellulitis. No evidence of abscess.   2.  There is fluid tracking along the extensor tendon sheath, cannot exclude infectious/septic tenosynovitis. Consider ultrasound evaluation.   3.  No evidence of deep fascial or intramuscular infection.   4.  No evidence of osteomyelitis.       Recent Cultures (last 7 days):     Results from last 7 days   Lab Units 08/08/24  0751   BLOOD CULTURE  Received in Microbiology Lab. Culture in Progress.  Received in Microbiology Lab. Culture in Progress.       Last 24 Hours Medication List:   Current Facility-Administered Medications   Medication Dose Route Frequency Provider Last Rate    acetaminophen  650 mg Oral Q6H PRN Celia Barrios MD      diphenhydrAMINE  25 mg Oral Q6H PRN Celia Barrios MD      vancomycin  1,500 mg Intravenous Q24H Celia Barrios MD 1,500 mg (08/08/24 3693)        Today, Patient Was  Seen By: Celia Barrios MD

## 2024-08-09 NOTE — PLAN OF CARE
Problem: PAIN - ADULT  Goal: Verbalizes/displays adequate comfort level or baseline comfort level  Description: Interventions:  - Encourage patient to monitor pain and request assistance  - Assess pain using appropriate pain scale (0-10)  - Administer analgesics based on type and severity of pain and evaluate response  - Implement non-pharmacological measures as appropriate and evaluate response  - Consider cultural and social influences on pain and pain management  - Notify physician/advanced practitioner if interventions unsuccessful or patient reports new pain  Outcome: Progressing     Problem: INFECTION - ADULT  Goal: Absence or prevention of progression during hospitalization  Description: INTERVENTIONS:  - Assess and monitor for signs and symptoms of infection  - Monitor lab/diagnostic results  - Monitor all insertion sites, i.e. indwelling lines  - Monitor endotracheal if appropriate and nasal secretions for changes in amount and color  - [place] appropriate cooling/warming therapies per order  - Administer medications as ordered  - Instruct and encourage patient and family to use good hand hygiene technique  - Identify and instruct in appropriate isolation precautions for identified infection/condition  Outcome: Progressing     Problem: SAFETY ADULT  Goal: Patient will remain free of falls  Description: INTERVENTIONS:  - Educate patient/family on patient safety including physical limitations  - Instruct patient to call for assistance with activity   - Consult OT/PT to assist with strengthening/mobility   - Keep Call bell within reach  - Keep bed low and locked with side rails adjusted as appropriate  - Keep care items and personal belongings within reach  - Initiate and maintain comfort rounds  - Make Fall Risk Sign visible to staff  - Offer Toileting every 2 Hours, in advance of need  - Initiate/Maintain bed/chair alarm  - Obtain necessary fall risk management equipment: nonskid footwear  - Apply yellow  socks and bracelet for high fall risk patients  - Consider moving patient to room near nurses station  Outcome: Progressing  Goal: Maintain or return to baseline ADL function  Description: INTERVENTIONS:  -  Assess patient's ability to carry out ADLs; assess patient's baseline for ADL function and identify physical deficits which impact ability to perform ADLs (bathing, care of mouth/teeth, toileting, grooming, dressing, etc.)  - Assess/evaluate cause of self-care deficits   - Assess range of motion  - Assess patient's mobility; develop plan if impaired  - Assess patient's need for assistive devices and provide as appropriate  - Encourage maximum independence but intervene and supervise when necessary  - Involve family in performance of ADLs  - Assess for home care needs following discharge   - Consider OT consult to assist with ADL evaluation and planning for discharge  - Provide patient education as appropriate  Outcome: Progressing  Goal: Maintains/Returns to pre admission functional level  Description: INTERVENTIONS:  - Perform AM-PAC 6 Click Basic Mobility/ Daily Activity assessment daily.  - Set and communicate daily mobility goal to care team and patient/family/caregiver.   - Collaborate with rehabilitation services on mobility goals if consulted  - Perform Range of Motion 4 times a day.  - Reposition patient every 2 hours.  - Dangle patient 3 times a day  - Stand patient 3 times a day  - Ambulate patient 3 times a day  - Out of bed to chair 3 times a day   - Out of bed for meals 3 times a day  - Out of bed for toileting  - Record patient progress and toleration of activity level   Outcome: Progressing     Problem: DISCHARGE PLANNING  Goal: Discharge to home or other facility with appropriate resources  Description: INTERVENTIONS:  - Identify barriers to discharge w/patient and caregiver  - Arrange for needed discharge resources and transportation as appropriate  - Identify discharge learning needs (meds,  wound care)  - Arrange for interpretive services to assist at discharge as needed  - Refer to Case Management Department for coordinating discharge planning if the patient needs post-hospital services based on physician/advanced practitioner order or complex needs related to functional status, cognitive ability, or social support system  Outcome: Progressing     Problem: Knowledge Deficit  Goal: Patient/family/caregiver demonstrates understanding of disease process, treatment plan, medications, and discharge instructions  Description: Complete learning assessment and assess knowledge base.  Interventions:  - Provide teaching at level of understanding  - Provide teaching via preferred learning methods  Outcome: Progressing     Problem: SKIN/TISSUE INTEGRITY - ADULT  Goal: Incision(s), wounds(s) or drain site(s) healing without S/S of infection  Description: INTERVENTIONS  - Assess and document dressing, incision, wound bed, drain sites and surrounding tissue  - Provide patient and family education  - Perform skin care/dressing changes as ordered  Outcome: Progressing

## 2024-08-09 NOTE — PLAN OF CARE
Problem: PAIN - ADULT  Goal: Verbalizes/displays adequate comfort level or baseline comfort level  Description: Interventions:  - Encourage patient to monitor pain and request assistance  - Assess pain using appropriate pain scale (0-10)  - Administer analgesics based on type and severity of pain and evaluate response  - Implement non-pharmacological measures as appropriate and evaluate response  - Consider cultural and social influences on pain and pain management  - Notify physician/advanced practitioner if interventions unsuccessful or patient reports new pain  Outcome: Progressing     Problem: INFECTION - ADULT  Goal: Absence or prevention of progression during hospitalization  Description: INTERVENTIONS:  - Assess and monitor for signs and symptoms of infection  - Monitor lab/diagnostic results  - Monitor all insertion sites, i.e. indwelling lines  - Monitor endotracheal if appropriate and nasal secretions for changes in amount and color  - [place] appropriate cooling/warming therapies per order  - Administer medications as ordered  - Instruct and encourage patient and family to use good hand hygiene technique  - Identify and instruct in appropriate isolation precautions for identified infection/condition  Outcome: Progressing     Problem: SAFETY ADULT  Goal: Patient will remain free of falls  Description: INTERVENTIONS:  - Educate patient/family on patient safety including physical limitations  - Instruct patient to call for assistance with activity   - Consult OT/PT to assist with strengthening/mobility   - Keep Call bell within reach  - Keep bed low and locked with side rails adjusted as appropriate  - Keep care items and personal belongings within reach  - Initiate and maintain comfort rounds  - Make Fall Risk Sign visible to staff  - Offer Toileting every 2 Hours, in advance of need  - Initiate/Maintain bed/chair alarm  - Obtain necessary fall risk management equipment: nonskid footwear  - Apply yellow  socks and bracelet for high fall risk patients  - Consider moving patient to room near nurses station  Outcome: Progressing  Goal: Maintain or return to baseline ADL function  Description: INTERVENTIONS:  -  Assess patient's ability to carry out ADLs; assess patient's baseline for ADL function and identify physical deficits which impact ability to perform ADLs (bathing, care of mouth/teeth, toileting, grooming, dressing, etc.)  - Assess/evaluate cause of self-care deficits   - Assess range of motion  - Assess patient's mobility; develop plan if impaired  - Assess patient's need for assistive devices and provide as appropriate  - Encourage maximum independence but intervene and supervise when necessary  - Involve family in performance of ADLs  - Assess for home care needs following discharge   - Consider OT consult to assist with ADL evaluation and planning for discharge  - Provide patient education as appropriate  Outcome: Progressing  Goal: Maintains/Returns to pre admission functional level  Description: INTERVENTIONS:  - Perform AM-PAC 6 Click Basic Mobility/ Daily Activity assessment daily.  - Set and communicate daily mobility goal to care team and patient/family/caregiver.   - Collaborate with rehabilitation services on mobility goals if consulted  - Perform Range of Motion 4 times a day.  - Reposition patient every 2 hours.  - Dangle patient 3 times a day  - Stand patient 3 times a day  - Ambulate patient 3 times a day  - Out of bed to chair 3 times a day   - Out of bed for meals 3 times a day  - Out of bed for toileting  - Record patient progress and toleration of activity level   Outcome: Progressing     Problem: DISCHARGE PLANNING  Goal: Discharge to home or other facility with appropriate resources  Description: INTERVENTIONS:  - Identify barriers to discharge w/patient and caregiver  - Arrange for needed discharge resources and transportation as appropriate  - Identify discharge learning needs (meds,  wound care)  - Arrange for interpretive services to assist at discharge as needed  - Refer to Case Management Department for coordinating discharge planning if the patient needs post-hospital services based on physician/advanced practitioner order or complex needs related to functional status, cognitive ability, or social support system  Outcome: Progressing     Problem: Knowledge Deficit  Goal: Patient/family/caregiver demonstrates understanding of disease process, treatment plan, medications, and discharge instructions  Description: Complete learning assessment and assess knowledge base.  Interventions:  - Provide teaching at level of understanding  - Provide teaching via preferred learning methods  Outcome: Progressing     Problem: SKIN/TISSUE INTEGRITY - ADULT  Goal: Incision(s), wounds(s) or drain site(s) healing without S/S of infection  Description: INTERVENTIONS  - Assess and document dressing, incision, wound bed, drain sites and surrounding tissue  - Provide patient and family education  - Perform skin care/dressing changes as ordered  Outcome: Progressing

## 2024-08-09 NOTE — CONSULTS
Consultation - Orthopedics   Chinmay Curtis 69 y.o. female MRN: [id number]  Unit/Bed#: 403-01 Encounter: [id number]      Assessment & Plan     Assessment:  Cellulitis left wrist/hand  Insect bite  Plan:  The patient was seen and examined.  The cellulitis is greatly improved today.  The patient states that her range of motion and swelling have improved.  At this point, would continue conservative treatment with ABX.  No signs of infectious tenosynovitis of hand.  Ice and elevation.  The patient is acceptable to this plan.    History of Present Illness   Physician Requesting Consult: Shailesh Marlow MD  Reason for Consult / Principal Problem: Cellulitis left hand  HPI: Chinmay Curtis is a 69 y.o. year old female who presented to the emergency department on 8/9/2024 after she was stung by a wasp.  The patient states that she was stung on the dorsal aspect of her left hand.  She was prescribed Keflex and was discharged home.  The patient returned to the emergency department yesterday, after the erythema and swelling along her hand worsened.  She was started on IV Vanco.  Today, the patient states that her hand and wrist feels much better.  She denies any significant pain.  She states that the erythema and swelling have also improved.  She denies any fever or chills.  She denies any numbness or tingling.  Inpatient consult to Orthopedic Surgery  Consult performed by: Donald Fischer PA-C  Consult ordered by: Shailesh Marlow MD          Review of Systems   Constitutional:  Negative for chills, fever and unexpected weight change.   HENT:  Negative for nosebleeds and sore throat.    Eyes:  Negative for pain, redness and visual disturbance.   Respiratory:  Negative for cough, shortness of breath and wheezing.    Cardiovascular:  Negative for chest pain, palpitations and leg swelling.   Gastrointestinal:  Negative for abdominal pain, nausea and vomiting.   Endocrine: Negative for polydipsia and polyuria.    Genitourinary:  Negative for dysuria and hematuria.   Musculoskeletal:  Positive for joint swelling and myalgias.        As noted in HPI   Skin:  Positive for color change. Negative for rash and wound.   Neurological:  Negative for dizziness, numbness and headaches.   Psychiatric/Behavioral:  Negative for decreased concentration and suicidal ideas. The patient is not nervous/anxious.        Historical Information   Past Medical History:   Diagnosis Date    Bell's palsy     Colon polyp     Hypertension     Kidney calculi     Spinal stenosis      Past Surgical History:   Procedure Laterality Date    CHOLECYSTECTOMY      COLONOSCOPY      ENDOMETRIAL ABLATION      LAMINECTOMY  06/14/2019    Lumbar spine - With placement of Trial device nonfusion device     Social History   Social History     Substance and Sexual Activity   Alcohol Use Never    Comment: occasionally     Social History     Substance and Sexual Activity   Drug Use Never     E-Cigarette/Vaping    E-Cigarette Use Never User      E-Cigarette/Vaping Substances     Social History     Tobacco Use   Smoking Status Never   Smokeless Tobacco Never     Family History:   Family History   Problem Relation Age of Onset    Dementia Mother     Heart disease Mother     Emphysema Father     No Known Problems Daughter     No Known Problems Maternal Grandmother     No Known Problems Maternal Grandfather     No Known Problems Paternal Grandmother     No Known Problems Paternal Grandfather     Colon cancer Maternal Uncle     No Known Problems Maternal Uncle     No Known Problems Maternal Uncle     No Known Problems Maternal Uncle     No Known Problems Maternal Uncle     No Known Problems Son     No Known Problems Brother        Meds/Allergies   all current active meds have been reviewed  Allergies   Allergen Reactions    Tramadol Vomiting    Ciprofloxacin Other (See Comments)     unknown    Lisinopril      Pt had one dose and became became presyncopal    Prednisone GI  "Intolerance       Objective   Vitals: Blood pressure 132/76, pulse 73, temperature 97.5 °F (36.4 °C), resp. rate 15, height 5' 2\" (1.575 m), weight 74.6 kg (164 lb 7.4 oz), SpO2 94%.,Body mass index is 30.08 kg/m².      Intake/Output Summary (Last 24 hours) at 8/9/2024 1447  Last data filed at 8/9/2024 1212  Gross per 24 hour   Intake 480 ml   Output --   Net 480 ml     I/O last 24 hours:  In: 660 [P.O.:660]  Out: -     Invasive Devices       Peripheral Intravenous Line  Duration             Peripheral IV 08/08/24 Right Antecubital 1 day                    Physical Exam  Ortho Exam  Left upper extremity is neurovascularly intact  Toes are pink and mobile  Compartments are soft  There is edema along the dorsum of the hand  There is erythema and warmth present with no signs of streaking  No conor pus  Improved range of motion of hand and fingers  Brisk cap refill  Sensation intact  Physical Exam   Constitutional: Appears well-developed and well-nourished. No distress.   HENT:   Head: Normocephalic.   Eyes: Conjunctivae are normal. Right eye exhibits no discharge. Left eye exhibits no discharge. No scleral icterus.   Cardiovascular: Normal rate.    Pulmonary/Chest: Effort normal.   Neurological: Alert and oriented to person, place, and time.   Skin: Skin is warm and dry.  As above  Psychiatric: Normal mood and affect. Behavior is normal. Judgment and thought content normal.    Lab Results: I have personally reviewed pertinent lab results.  CBC:   Lab Results   Component Value Date    WBC 8.31 08/09/2024    HGB 15.2 08/09/2024    HCT 45.5 08/09/2024    MCV 92 08/09/2024     08/09/2024    RBC 4.97 08/09/2024    MCH 30.6 08/09/2024    MCHC 33.4 08/09/2024    RDW 11.6 08/09/2024    MPV 9.3 08/09/2024    NRBC 0 08/09/2024     CMP: No results found for: \"SODIUM\", \"CL\", \"CO2\", \"ANIONGAP\", \"BUN\", \"CREATININE\", \"GLUCOSE\", \"CALCIUM\", \"AST\", \"ALT\", \"ALKPHOS\", \"PROT\", \"BILITOT\", \"EGFR\"  Imaging Studies: I have personally " reviewed pertinent films in PACS  CT scan of left upper extremity    1.  Dorsal hand and wrist cellulitis. No evidence of abscess.  2.  There is fluid tracking along the extensor tendon sheath, cannot exclude infectious/septic tenosynovitis. Consider ultrasound evaluation.  3.  No evidence of deep fascial or intramuscular infection.  4.  No evidence of osteomyelitis.  EKG, Pathology, and Other Studies: I have personally reviewed pertinent films in PACS  VTE Prophylaxis: Sequential compression device (Venodyne)     Code Status: Level 1 - Full Code  Advance Directive and Living Will:      Power of :    POLST:      Counseling / Coordination of Care  Total floor / unit time spent today 30 minutes. Greater than 50% of total time was spent with the patient and / or family counseling and / or coordination of care.

## 2024-08-09 NOTE — PLAN OF CARE
Problem: PAIN - ADULT  Goal: Verbalizes/displays adequate comfort level or baseline comfort level  Description: Interventions:  - Encourage patient to monitor pain and request assistance  - Assess pain using appropriate pain scale (0-10)  - Administer analgesics based on type and severity of pain and evaluate response  - Implement non-pharmacological measures as appropriate and evaluate response  - Consider cultural and social influences on pain and pain management  - Notify physician/advanced practitioner if interventions unsuccessful or patient reports new pain  Outcome: Progressing     Problem: INFECTION - ADULT  Goal: Absence or prevention of progression during hospitalization  Description: INTERVENTIONS:  - Assess and monitor for signs and symptoms of infection  - Monitor lab/diagnostic results  - Monitor all insertion sites, i.e. indwelling lines, tubes, and drains  - Monitor endotracheal if appropriate and nasal secretions for changes in amount and color  - [place] appropriate cooling/warming therapies per order  - Administer medications as ordered  - Instruct and encourage patient and family to use good hand hygiene technique  - Identify and instruct in appropriate isolation precautions for identified infection/condition  Outcome: Progressing     Problem: Knowledge Deficit  Goal: Patient/family/caregiver demonstrates understanding of disease process, treatment plan, medications, and discharge instructions  Description: Complete learning assessment and assess knowledge base.  Interventions:  - Provide teaching at level of understanding  - Provide teaching via preferred learning methods  Outcome: Progressing     Problem: SKIN/TISSUE INTEGRITY - ADULT  Goal: Incision(s), wounds(s) or drain site(s) healing without S/S of infection  Description: INTERVENTIONS  - Assess and document dressing, incision, wound bed, drain sites and surrounding tissue  - Provide patient and family education  - Perform skin  care/dressing changes as ordered  Outcome: Progressing

## 2024-08-10 PROCEDURE — 99239 HOSP IP/OBS DSCHRG MGMT >30: CPT | Performed by: FAMILY MEDICINE

## 2024-08-10 RX ORDER — CEPHALEXIN 500 MG/1
500 CAPSULE ORAL EVERY 6 HOURS SCHEDULED
Qty: 21 CAPSULE | Refills: 0 | Status: SHIPPED | OUTPATIENT
Start: 2024-08-10 | End: 2024-08-15

## 2024-08-10 NOTE — DISCHARGE SUMMARY
"  Discharge Summary - Chinmay Curtis 69 y.o. female MRN: [id number]    Unit/Bed#: 403-01 Encounter: [id number]    Admission Date:   Admission Orders (From admission, onward)       Ordered        08/09/24 1041  INPATIENT ADMISSION  Once            08/08/24 0840  Place in Observation  Once            08/08/24 0840  Place in Observation  Once                            Admitting Diagnosis: Hand swelling [M79.89]  Bee sting [T63.441A]  Cellulitis of left hand [L03.114]  Localized swelling on left hand [R22.32]    HPI: Patient is a 69-year-old female presenting to the ED for evaluation of a wasp sting to the left hand that occurred approximately 2 days prior to arrival. Patient states that she was stung on the left third digit by a wasp around 12:30 PM of the 8/6/2024. She denies any shortness of breath, difficulty breathing, wheezing, hives/urticaria, swelling of the lips/tongue/throat, throat tightness, dizziness, lightheadedness, syncope, nausea, vomiting or abdominal pain. She denies any history of anaphylaxis to prior bee stings; however, states that she developed significant redness and swelling throughout her entire arm after a wasp sting a few years ago and had to be admitted to the hospital for IV antibiotics. Patient states that there is redness and swelling that is spreading up into her hand and she is concerned that this will progress like it did last time. She denies any fevers or chills. She took 2 Benadryl (50mg total) around 1PM of the same day of the sting\", patient was prescribed Keflex but she did not take it.     Procedures Performed: No orders of the defined types were placed in this encounter.      Summary of Hospital Course:     Left upper extremity cellulitis  Bee sting of left upper extremity    69-year-old female presented to the emergency room 3 days prior to admission after being stung by a bee and having a local reaction.  Patient was prescribed Keflex, unfortunately she did not fill her " prescription or take any other medications.  She presented with worsening swelling, recommended for observation.  Treated with IV vancomycin and had a CT scan of the left upper extremity that was concerning for tenosynovitis.  Seen in consultation by orthopedic surgery who recommended no surgical intervention, rather completion of antibiotic course.  Patient had significant improvement of edema, erythema and range of motion.  She will be discharged home on Keflex to complete 7 days of antibiotics.    I spent an extended amount of time discussing the importance of medication adherence, keeping arm elevated, cool compresses as well as need to f/u with PCP. Warning signs reviewed and patient verbalized understanding     Significant Findings, Care, Treatment and Services Provided:     CT    1.  Dorsal hand and wrist cellulitis. No evidence of abscess.   2.  There is fluid tracking along the extensor tendon sheath, cannot exclude infectious/septic tenosynovitis. Consider ultrasound evaluation.   3.  No evidence of deep fascial or intramuscular infection.   4.  No evidence of osteomyelitis.     Complications: none    Discharge Diagnosis: see above    Medical Problems       Resolved Problems  Date Reviewed: 8/9/2024   None         Condition at Discharge: fair         Discharge instructions/Information to patient and family:   See after visit summary for information provided to patient and family.      Provisions for Follow-Up Care:  See after visit summary for information related to follow-up care and any pertinent home health orders.      PCP: Olaf Maldonado DO    Disposition: Home    Planned Readmission: No      Discharge Statement   I spent 40 minutes discharging the patient. This time was spent on the day of discharge. I had direct contact with the patient on the day of discharge. Additional documentation is required if more than 30 minutes were spent on discharge.     Discharge Medications:  See after visit summary for  reconciled discharge medications provided to patient and family.

## 2024-08-10 NOTE — CASE MANAGEMENT
Case Management Discharge Planning Note    Patient name Chinmay Curtis  Location /403-01 MRN [id number]  : 1955 Date 8/10/2024       Current Admission Date: 2024  Current Admission Diagnosis:Cellulitis of left upper extremity   Patient Active Problem List    Diagnosis Date Noted Date Diagnosed    History of colon polyps 2024     Tubular adenoma of colon 2024     Hemorrhoids 2024     Back pain 2023     Cellulitis of left upper extremity 10/12/2022     Dyslipidemia 10/12/2022     COVID-19 2021     Nausea 2020     Constipation 2020     Generalized abdominal pain 2020     Glucose intolerance (impaired glucose tolerance) 2017     Gastroesophageal reflux disease 2017     Benign paroxysmal vertigo 2016     Bell's palsy 2016       LOS (days): 1  Geometric Mean LOS (GMLOS) (days): 3.2  Days to GMLOS:2.3     OBJECTIVE:  Risk of Unplanned Readmission Score: 6.9         Current admission status: Inpatient   Preferred Pharmacy:   [facility] [address] [place] [address], [address]  [address] [place] [address], [address] [facility] [address]  Phone: [phone] Fax: [phone]    Primary Care Provider: Olaf Maldonado DO    Primary Insurance: MEDICARE  Secondary Insurance: [employer]    DISCHARGE DETAILS:      Patient stable for discharge home today.  Follow up providers listed on AVS.  Family to transport patient home.

## 2024-08-12 ENCOUNTER — TRANSITIONAL CARE MANAGEMENT (OUTPATIENT)
Dept: FAMILY MEDICINE CLINIC | Facility: CLINIC | Age: 69
End: 2024-08-12

## 2024-08-13 LAB
BACTERIA BLD CULT: NORMAL
BACTERIA BLD CULT: NORMAL

## 2024-09-23 ENCOUNTER — APPOINTMENT (EMERGENCY)
Dept: CT IMAGING | Facility: HOSPITAL | Age: 69
End: 2024-09-23
Payer: MEDICARE

## 2024-09-23 ENCOUNTER — HOSPITAL ENCOUNTER (EMERGENCY)
Facility: HOSPITAL | Age: 69
Discharge: HOME | End: 2024-09-23
Attending: EMERGENCY MEDICINE
Payer: MEDICARE

## 2024-09-23 VITALS
DIASTOLIC BLOOD PRESSURE: 76 MMHG | WEIGHT: 159 LBS | RESPIRATION RATE: 18 BRPM | SYSTOLIC BLOOD PRESSURE: 141 MMHG | OXYGEN SATURATION: 98 % | HEIGHT: 62 IN | HEART RATE: 68 BPM | BODY MASS INDEX: 29.26 KG/M2 | TEMPERATURE: 97.6 F

## 2024-09-23 DIAGNOSIS — N28.9 RENAL LESION: ICD-10-CM

## 2024-09-23 DIAGNOSIS — K59.00 CONSTIPATION: Primary | ICD-10-CM

## 2024-09-23 DIAGNOSIS — K57.90 DIVERTICULOSIS: ICD-10-CM

## 2024-09-23 LAB
ALBUMIN SERPL BCG-MCNC: 3.7 G/DL (ref 3.5–5)
ALP SERPL-CCNC: 49 U/L (ref 34–104)
ALT SERPL W P-5'-P-CCNC: 17 U/L (ref 7–52)
ANION GAP SERPL CALCULATED.3IONS-SCNC: 6 MMOL/L (ref 4–13)
AST SERPL W P-5'-P-CCNC: 18 U/L (ref 13–39)
BASOPHILS # BLD AUTO: 0.07 THOUSANDS/ΜL (ref 0–0.1)
BASOPHILS NFR BLD AUTO: 1 % (ref 0–1)
BILIRUB SERPL-MCNC: 0.44 MG/DL (ref 0.2–1)
BILIRUB UR QL STRIP: NEGATIVE
BUN SERPL-MCNC: 13 MG/DL (ref 5–25)
CALCIUM SERPL-MCNC: 8.9 MG/DL (ref 8.4–10.2)
CHLORIDE SERPL-SCNC: 106 MMOL/L (ref 96–108)
CLARITY UR: CLEAR
CO2 SERPL-SCNC: 27 MMOL/L (ref 21–32)
COLOR UR: NORMAL
CREAT SERPL-MCNC: 0.77 MG/DL (ref 0.6–1.3)
EOSINOPHIL # BLD AUTO: 0.15 THOUSAND/ΜL (ref 0–0.61)
EOSINOPHIL NFR BLD AUTO: 3 % (ref 0–6)
ERYTHROCYTE [DISTWIDTH] IN BLOOD BY AUTOMATED COUNT: 11.5 % (ref 11.6–15.1)
GFR SERPL CREATININE-BSD FRML MDRD: 79 ML/MIN/1.73SQ M
GLUCOSE SERPL-MCNC: 95 MG/DL (ref 65–140)
GLUCOSE UR STRIP-MCNC: NEGATIVE MG/DL
HCT VFR BLD AUTO: 41.9 % (ref 34.8–46.1)
HGB BLD-MCNC: 13.9 G/DL (ref 11.5–15.4)
HGB UR QL STRIP.AUTO: NEGATIVE
IMM GRANULOCYTES # BLD AUTO: 0.01 THOUSAND/UL (ref 0–0.2)
IMM GRANULOCYTES NFR BLD AUTO: 0 % (ref 0–2)
KETONES UR STRIP-MCNC: NEGATIVE MG/DL
LEUKOCYTE ESTERASE UR QL STRIP: NEGATIVE
LIPASE SERPL-CCNC: 13 U/L (ref 11–82)
LYMPHOCYTES # BLD AUTO: 1.93 THOUSANDS/ΜL (ref 0.6–4.47)
LYMPHOCYTES NFR BLD AUTO: 33 % (ref 14–44)
MAGNESIUM SERPL-MCNC: 2.1 MG/DL (ref 1.9–2.7)
MCH RBC QN AUTO: 31.1 PG (ref 26.8–34.3)
MCHC RBC AUTO-ENTMCNC: 33.2 G/DL (ref 31.4–37.4)
MCV RBC AUTO: 94 FL (ref 82–98)
MONOCYTES # BLD AUTO: 0.58 THOUSAND/ΜL (ref 0.17–1.22)
MONOCYTES NFR BLD AUTO: 10 % (ref 4–12)
NEUTROPHILS # BLD AUTO: 3.17 THOUSANDS/ΜL (ref 1.85–7.62)
NEUTS SEG NFR BLD AUTO: 53 % (ref 43–75)
NITRITE UR QL STRIP: NEGATIVE
NRBC BLD AUTO-RTO: 0 /100 WBCS
PH UR STRIP.AUTO: 7.5 [PH]
PLATELET # BLD AUTO: 240 THOUSANDS/UL (ref 149–390)
PMV BLD AUTO: 9.5 FL (ref 8.9–12.7)
POTASSIUM SERPL-SCNC: 4.4 MMOL/L (ref 3.5–5.3)
PROT SERPL-MCNC: 6.5 G/DL (ref 6.4–8.4)
PROT UR STRIP-MCNC: NEGATIVE MG/DL
RBC # BLD AUTO: 4.47 MILLION/UL (ref 3.81–5.12)
SODIUM SERPL-SCNC: 139 MMOL/L (ref 135–147)
SP GR UR STRIP.AUTO: 1.01
UROBILINOGEN UR QL STRIP.AUTO: 0.2 E.U./DL
WBC # BLD AUTO: 5.91 THOUSAND/UL (ref 4.31–10.16)

## 2024-09-23 PROCEDURE — 99284 EMERGENCY DEPT VISIT MOD MDM: CPT

## 2024-09-23 PROCEDURE — 74177 CT ABD & PELVIS W/CONTRAST: CPT

## 2024-09-23 PROCEDURE — 81003 URINALYSIS AUTO W/O SCOPE: CPT

## 2024-09-23 PROCEDURE — 83735 ASSAY OF MAGNESIUM: CPT

## 2024-09-23 PROCEDURE — 83690 ASSAY OF LIPASE: CPT

## 2024-09-23 PROCEDURE — 36415 COLL VENOUS BLD VENIPUNCTURE: CPT

## 2024-09-23 PROCEDURE — 80053 COMPREHEN METABOLIC PANEL: CPT

## 2024-09-23 PROCEDURE — 96365 THER/PROPH/DIAG IV INF INIT: CPT

## 2024-09-23 PROCEDURE — 99285 EMERGENCY DEPT VISIT HI MDM: CPT

## 2024-09-23 PROCEDURE — 96366 THER/PROPH/DIAG IV INF ADDON: CPT

## 2024-09-23 PROCEDURE — 85025 COMPLETE CBC W/AUTO DIFF WBC: CPT

## 2024-09-23 RX ORDER — SODIUM CHLORIDE, SODIUM GLUCONATE, SODIUM ACETATE, POTASSIUM CHLORIDE, MAGNESIUM CHLORIDE, SODIUM PHOSPHATE, DIBASIC, AND POTASSIUM PHOSPHATE .53; .5; .37; .037; .03; .012; .00082 G/100ML; G/100ML; G/100ML; G/100ML; G/100ML; G/100ML; G/100ML
1000 INJECTION, SOLUTION INTRAVENOUS ONCE
Status: COMPLETED | OUTPATIENT
Start: 2024-09-23 | End: 2024-09-23

## 2024-09-23 RX ADMIN — GLYCERIN 1 SUPPOSITORY: 2 SUPPOSITORY RECTAL at 12:16

## 2024-09-23 RX ADMIN — SODIUM CHLORIDE, SODIUM GLUCONATE, SODIUM ACETATE, POTASSIUM CHLORIDE, MAGNESIUM CHLORIDE, SODIUM PHOSPHATE, DIBASIC, AND POTASSIUM PHOSPHATE 1000 ML: .53; .5; .37; .037; .03; .012; .00082 INJECTION, SOLUTION INTRAVENOUS at 09:35

## 2024-09-23 RX ADMIN — IOHEXOL 100 ML: 350 INJECTION, SOLUTION INTRAVENOUS at 10:58

## 2024-09-23 NOTE — ED PROVIDER NOTES
1. Constipation    2. Diverticulosis    3. Right renal lesion      ED Disposition       ED Disposition   Discharge    Condition   Stable    Date/Time   Mon Sep 23, 2024 12:03 PM    Comment   Chinmay Curtis discharge to home/self care.                   Assessment & Plan       Medical Decision Making  Patient is a well-appearing 69-year-old female presenting with constipation x 5 days. She had a small hard BM this morning and is passing minimal gas.  Brief focused differential diagnosis: Constipation, fecal impaction, bowel obstruction but less likely, electrolyte abnormality  Plan is to get abdominal labs including CBC, CMP, and lipase and CT abdomen/pelvis w IV contrast to evaluate for acute abdominal pathology. Will check electrolytes including magnesium. UA to rule out UTI.  See ED course for interpretation of labs, imaging, and further medical decision making.   IV fluids to encourage hydration. Labs and imaging are reassuring. No evidence of bowel obstruction or fecal impaction. Offered enema here in the emergency department but she declined. Offered a suppository as well but she would like it for home. Advised her to do a bowel prep including half a bottle of MiraLAX with a 32 oz drink of her choice and return if she does not have a bowel movement in the next few days. Advised her to follow-up with GI.  Dispo: Patient is safe/stable for discharge home and was discharged home with strict return precautions. Provided verbal and written supportive care instructions for managing her illness. Advised patient to return to the nearest emergency room if she has new or worsening symptoms or if any questions arise. Advised patient to follow-up with her family doctor and GI. Patient is satisfied with care and agrees with management and plan.     Amount and/or Complexity of Data Reviewed  External Data Reviewed: labs, radiology and notes.  Labs: ordered. Decision-making details documented in ED Course.  Radiology:  ordered. Decision-making details documented in ED Course.    Risk  OTC drugs.  Prescription drug management.                ED Course as of 09/23/24 1308   Mon Sep 23, 2024   0859 Blood Pressure: 141/76  Mildly elevated BP. Other vital signs reviewed and WNL.   1036 CBC and differential(!)  No leukocytosis, anemia, or platelet abnormality.   1036 CMP  Electrolytes WNL. No PADMA or glucose abnormality. No transaminitis.   1051 LIPASE: 13   1051 MAGNESIUM: 2.1   1051 UA w Reflex to Microscopic w Reflex to Culture  Negative for UTI.   1148 CT Abdomen pelvis with contrast  IMPRESSION:  1.  Moderate colonic stool burden without bowel obstruction.  2.  Indeterminate right renal lesion. Contrast-enhanced MRI of the abdomen is advised for further assessment.   1150 Went over results with patient. She feels good at this time but is worried about the constipation. She declined enema and rectal exam. She asked for a suppository for home. She will trial bowel prep at home.        Medications   multi-electrolyte (ISOLYTE-S PH 7.4) bolus 1,000 mL (0 mL Intravenous Stopped 9/23/24 1220)   iohexol (OMNIPAQUE) 350 MG/ML injection (MULTI-DOSE) 100 mL (100 mL Intravenous Given 9/23/24 1058)   glycerin (adult) rectal suppository 1 suppository (1 suppository Rectal Given 9/23/24 1216)       History of Present Illness       Patient is a 69-year-old female with relevant past medical history of Bell's palsy, colon polyp, hypertension, kidney calculi, and spinal stenosis presenting with constipation x 5 days. She has felt bloated for the last 5 days. She is passing minimal gas. She tried a Fleet enema at home.  She reports the constipation is severe and she struggles to get anything out. Last bowel movement prior to this morning was roughly 4 days ago. She had a very small formed bowel movement this morning with some mild bleeding but she has a history of hemorrhoids. She reports a lot of straining. She has been drinking a lot of fluids and  trialing MiraLAX and Fleet enema without relief. She has a longstanding history of constipation but never this severe. Very minimal lower abdominal pain and a little bladder pressure from the pain. No F/C or N/V.       History provided by:  Patient and spouse   used: No    Constipation  Associated symptoms: abdominal pain    Associated symptoms: no back pain, no diarrhea, no dysuria, no fever, no nausea and no vomiting        Review of Systems   Constitutional:  Negative for chills and fever.   HENT:  Negative for congestion, ear pain, rhinorrhea and sore throat.    Eyes:  Negative for pain and visual disturbance.   Respiratory:  Negative for cough and shortness of breath.    Cardiovascular:  Negative for chest pain and palpitations.   Gastrointestinal:  Positive for abdominal pain and constipation. Negative for diarrhea, nausea and vomiting.   Genitourinary:  Negative for dysuria, frequency, hematuria and urgency.   Musculoskeletal:  Negative for arthralgias and back pain.   Skin:  Negative for color change and rash.   Neurological:  Negative for dizziness, seizures, syncope, weakness, light-headedness and headaches.   Psychiatric/Behavioral:  Negative for agitation and confusion.            Objective     ED Triage Vitals [09/23/24 0846]   Temperature Pulse Blood Pressure Respirations SpO2 Patient Position - Orthostatic VS   97.6 °F (36.4 °C) 75 141/76 18 96 % Lying      Temp Source Heart Rate Source BP Location FiO2 (%) Pain Score    Temporal -- Right arm -- 10 - Worst Possible Pain        Physical Exam  Vitals and nursing note reviewed.   Constitutional:       General: She is not in acute distress.     Appearance: She is well-developed. She is not ill-appearing.   HENT:      Head: Normocephalic and atraumatic.   Eyes:      Conjunctiva/sclera: Conjunctivae normal.   Cardiovascular:      Rate and Rhythm: Normal rate and regular rhythm.      Heart sounds: No murmur heard.  Pulmonary:      Effort:  Pulmonary effort is normal. No respiratory distress.      Breath sounds: Normal breath sounds. No wheezing, rhonchi or rales.   Abdominal:      Palpations: Abdomen is soft.      Tenderness: There is abdominal tenderness in the right lower quadrant, suprapubic area and left lower quadrant. There is no guarding or rebound.   Musculoskeletal:         General: No swelling.      Cervical back: Neck supple.   Skin:     General: Skin is warm and dry.      Capillary Refill: Capillary refill takes less than 2 seconds.   Neurological:      General: No focal deficit present.      Mental Status: She is alert and oriented to person, place, and time.      GCS: GCS eye subscore is 4. GCS verbal subscore is 5. GCS motor subscore is 6.      Cranial Nerves: Cranial nerves 2-12 are intact.      Sensory: Sensation is intact.      Motor: Motor function is intact.      Coordination: Coordination is intact.      Gait: Gait is intact.   Psychiatric:         Mood and Affect: Mood normal.         Labs Reviewed   CBC AND DIFFERENTIAL - Abnormal       Result Value    WBC 5.91      RBC 4.47      Hemoglobin 13.9      Hematocrit 41.9      MCV 94      MCH 31.1      MCHC 33.2      RDW 11.5 (*)     MPV 9.5      Platelets 240      nRBC 0      Segmented % 53      Immature Grans % 0      Lymphocytes % 33      Monocytes % 10      Eosinophils Relative 3      Basophils Relative 1      Absolute Neutrophils 3.17      Absolute Immature Grans 0.01      Absolute Lymphocytes 1.93      Absolute Monocytes 0.58      Eosinophils Absolute 0.15      Basophils Absolute 0.07     LIPASE - Normal    Lipase 13     MAGNESIUM - Normal    Magnesium 2.1     UA W REFLEX TO MICROSCOPIC WITH REFLEX TO CULTURE - Normal    Color, UA Straw      Clarity, UA Clear      Specific Gravity, UA 1.015      pH, UA 7.5      Leukocytes, UA Negative      Nitrite, UA Negative      Protein, UA Negative      Glucose, UA Negative      Ketones, UA Negative      Urobilinogen, UA 0.2      Bilirubin,  UA Negative      Occult Blood, UA Negative     COMPREHENSIVE METABOLIC PANEL    Sodium 139      Potassium 4.4      Chloride 106      CO2 27      ANION GAP 6      BUN 13      Creatinine 0.77      Glucose 95      Calcium 8.9      AST 18      ALT 17      Alkaline Phosphatase 49      Total Protein 6.5      Albumin 3.7      Total Bilirubin 0.44      eGFR 79      Narrative:     National Kidney Disease Foundation guidelines for Chronic Kidney Disease (CKD):     Stage 1 with normal or high GFR (GFR > 90 mL/min/1.73 square meters)    Stage 2 Mild CKD (GFR = 60-89 mL/min/1.73 square meters)    Stage 3A Moderate CKD (GFR = 45-59 mL/min/1.73 square meters)    Stage 3B Moderate CKD (GFR = 30-44 mL/min/1.73 square meters)    Stage 4 Severe CKD (GFR = 15-29 mL/min/1.73 square meters)    Stage 5 End Stage CKD (GFR <15 mL/min/1.73 square meters)  Note: GFR calculation is accurate only with a steady state creatinine     CT Abdomen pelvis with contrast   Final Interpretation by Rolando Arroyo MD (09/23 1137)      1.  Moderate colonic stool burden without bowel obstruction.   2.  Indeterminate right renal lesion. Contrast-enhanced MRI of the abdomen is advised for further assessment.      The study was marked in EPIC for immediate notification.      Workstation performed: [id number]             Procedures    ED Medication and Procedure Management   Prior to Admission Medications   Prescriptions Last Dose Informant Patient Reported? Taking?   Probiotic Product (PROBIOTIC PO)   Yes No   Sig: Take by mouth daily   cholecalciferol (VITAMIN D3) 400 units tablet   Yes No   Sig: Take 400 Units by mouth daily   polyethylene glycol (MIRALAX) 17 g packet   No No   Sig: Take 17 g by mouth 2 (two) times a day      Facility-Administered Medications: None     Discharge Medication List as of 9/23/2024 12:13 PM        CONTINUE these medications which have NOT CHANGED    Details   cholecalciferol (VITAMIN D3) 400 units tablet Take 400 Units by mouth  daily, Historical Med      polyethylene glycol (MIRALAX) 17 g packet Take 17 g by mouth 2 (two) times a day, Starting Wed 7/17/2024, Normal      Probiotic Product (PROBIOTIC PO) Take by mouth daily, Historical Med           No discharge procedures on file.     Leonid Cabral PA-C  09/23/24 [id number]

## 2024-09-23 NOTE — DISCHARGE INSTRUCTIONS
Immediately return to the emergency room if you experience any new or worsening symptoms or if the symptoms are lasting longer than expected.     Please insert the glycerin suppository at home. Do a bowel prep at home including a large bottle of MiraLAX (~250g) and combine with roughly 32 oz of non red gatorade and drink over the course of a day. Increase fluids as well. Please follow-up with your family doctor or GI doctor if you do not have a bowel movement in the next few days.    For constipation, take the colace regularly, twice daily.   Also start taking the Miralax daily, do not take this for more than 7 days at a time.   If, despite taking the Miralax you still do not have a bowel movement in 3 days, add the bisacodyl daily.   It is recommended that you continue taking colace after your bowel movements become regular.    CT abdomen/pelvis IMPRESSION:  1.  Moderate colonic stool burden without bowel obstruction.  2.  Indeterminate right renal lesion. Contrast-enhanced MRI of the abdomen is advised for further assessment.

## 2024-11-18 ENCOUNTER — OFFICE VISIT (OUTPATIENT)
Dept: URGENT CARE | Facility: MEDICAL CENTER | Age: 69
End: 2024-11-18
Payer: MEDICARE

## 2024-11-18 VITALS
SYSTOLIC BLOOD PRESSURE: 148 MMHG | WEIGHT: 164 LBS | RESPIRATION RATE: 21 BRPM | HEART RATE: 94 BPM | OXYGEN SATURATION: 100 % | BODY MASS INDEX: 30.18 KG/M2 | TEMPERATURE: 98.5 F | HEIGHT: 62 IN | DIASTOLIC BLOOD PRESSURE: 99 MMHG

## 2024-11-18 DIAGNOSIS — J01.90 ACUTE NON-RECURRENT SINUSITIS, UNSPECIFIED LOCATION: Primary | ICD-10-CM

## 2024-11-18 PROCEDURE — 99212 OFFICE O/P EST SF 10 MIN: CPT | Performed by: PHYSICIAN ASSISTANT

## 2024-11-18 PROCEDURE — G0463 HOSPITAL OUTPT CLINIC VISIT: HCPCS | Performed by: PHYSICIAN ASSISTANT

## 2024-11-18 RX ORDER — METHYLPREDNISOLONE 4 MG/1
TABLET ORAL
Qty: 1 EACH | Refills: 0 | Status: SHIPPED | OUTPATIENT
Start: 2024-11-18

## 2024-11-18 NOTE — PROGRESS NOTES
[facility]        NAME: Chinmay Curtis is a 69 y.o. female  : 1955    MRN: [id number]  DATE: 2024  TIME: 9:26 AM    Assessment and Plan   Acute non-recurrent sinusitis, unspecified location [J01.90]  1. Acute non-recurrent sinusitis, unspecified location  methylPREDNISolone 4 MG tablet therapy pack    amoxicillin-clavulanate (AUGMENTIN) 875-125 mg per tablet            Patient Instructions     Start antibiotic and take with food  Probiotics or yogurt to replace the good bacteria in your gut  Over the counter cold medication to control symptoms  Drink plenty of fluids  If symptoms fail to improve follow up with PCP  If symptoms worsen have yourself rechecked    Follow up with PCP in 3-5 days.  Proceed to  ER if symptoms worsen.    If tests have been performed at [facility], our office will contact you with results if changes need to be made to the care plan discussed with you at the visit.  You can review your full results on [facility] MyChart.    Chief Complaint     Chief Complaint   Patient presents with    URI     Pt states she was around her granddaughter who was sick and she got sick right after that, pt has been feeling symptoms for a little over 2wks, pt has a surplus amount of mucus, nasal congestion constantly blowing her nose, hoarseness, dry throat, pt has taken ibuprofen but it isn't helping         History of Present Illness       Patient presents with cold-like symptoms for the past 2 weeks.  She initially was exposed to her granddaughter who is sick.  She has a lot of thick colored nasal drainage.  This morning she woke up with a hoarse voice.  Patient denies fever, chills, sore throat, nausea, vomiting, diarrhea or headaches.        Review of Systems   Review of Systems   Constitutional:  Negative for chills and fever.   HENT:  Positive for congestion and rhinorrhea. Negative for sore throat.    Respiratory:  Negative for cough and chest tightness.    Cardiovascular:   Negative for chest pain.   Gastrointestinal:  Negative for diarrhea, nausea and vomiting.   Neurological:  Negative for headaches.         Current Medications       Current Outpatient Medications:     amoxicillin-clavulanate (AUGMENTIN) 875-125 mg per tablet, Take 1 tablet by mouth 2 (two) times a day for 7 days, Disp: 14 tablet, Rfl: 0    cholecalciferol (VITAMIN D3) 400 units tablet, Take 400 Units by mouth daily, Disp: , Rfl:     methylPREDNISolone 4 MG tablet therapy pack, Use as directed on package, Disp: 1 each, Rfl: 0    polyethylene glycol (MIRALAX) 17 g packet, Take 17 g by mouth 2 (two) times a day, Disp: 578 g, Rfl: 6    Probiotic Product (PROBIOTIC PO), Take by mouth daily, Disp: , Rfl:     Current Allergies     Allergies as of 11/18/2024 - Reviewed 11/18/2024   Allergen Reaction Noted    Tramadol Vomiting 04/28/2016    Ciprofloxacin Other (See Comments) 04/28/2016    Lisinopril  12/09/2017    Prednisone GI Intolerance 02/29/2016            The following portions of the patient's history were reviewed and updated as appropriate: allergies, current medications, past family history, past medical history, past social history, past surgical history and problem list.     Past Medical History:   Diagnosis Date    Bell's palsy     Colon polyp     Hypertension     Kidney calculi     Spinal stenosis        Past Surgical History:   Procedure Laterality Date    CHOLECYSTECTOMY      COLONOSCOPY      ENDOMETRIAL ABLATION      LAMINECTOMY  06/14/2019    Lumbar spine - With placement of Trial device nonfusion device       Family History   Problem Relation Age of Onset    Dementia Mother     Heart disease Mother     Emphysema Father     No Known Problems Daughter     No Known Problems Maternal Grandmother     No Known Problems Maternal Grandfather     No Known Problems Paternal Grandmother     No Known Problems Paternal Grandfather     Colon cancer Maternal Uncle     No Known Problems Maternal Uncle     No Known  "Problems Maternal Uncle     No Known Problems Maternal Uncle     No Known Problems Maternal Uncle     No Known Problems Son     No Known Problems Brother          Medications have been verified.        Objective   /99 (BP Location: Left arm, Patient Position: Sitting, Cuff Size: Adult)   Pulse 94   Temp 98.5 °F (36.9 °C)   Resp 21   Ht 5' 2\" (1.575 m)   Wt 74.4 kg (164 lb)   SpO2 100%   BMI 30.00 kg/m²   No LMP recorded. Patient is postmenopausal.       Physical Exam     Physical Exam  Vitals and nursing note reviewed.   Constitutional:       Appearance: Normal appearance.   HENT:      Head: Normocephalic and atraumatic.      Right Ear: Tympanic membrane normal.      Left Ear: Tympanic membrane normal.      Nose: Congestion and rhinorrhea present.      Mouth/Throat:      Mouth: Mucous membranes are moist.      Pharynx: Oropharynx is clear.   Eyes:      Conjunctiva/sclera: Conjunctivae normal.   Cardiovascular:      Rate and Rhythm: Normal rate and regular rhythm.      Heart sounds: Normal heart sounds.   Pulmonary:      Effort: Pulmonary effort is normal.      Breath sounds: Normal breath sounds.   Musculoskeletal:      Cervical back: Neck supple.   Lymphadenopathy:      Cervical: No cervical adenopathy.   Skin:     General: Skin is warm.   Neurological:      Mental Status: She is alert.                   "

## 2025-01-28 ENCOUNTER — OFFICE VISIT (OUTPATIENT)
Age: 70
End: 2025-01-28
Payer: MEDICARE

## 2025-01-28 VITALS
HEIGHT: 62 IN | OXYGEN SATURATION: 98 % | DIASTOLIC BLOOD PRESSURE: 90 MMHG | SYSTOLIC BLOOD PRESSURE: 136 MMHG | TEMPERATURE: 98 F | BODY MASS INDEX: 30.36 KG/M2 | WEIGHT: 165 LBS | HEART RATE: 90 BPM | RESPIRATION RATE: 18 BRPM

## 2025-01-28 DIAGNOSIS — D12.6 TUBULAR ADENOMA OF COLON: ICD-10-CM

## 2025-01-28 DIAGNOSIS — Z86.0100 HISTORY OF COLON POLYPS: ICD-10-CM

## 2025-01-28 DIAGNOSIS — K59.04 CHRONIC IDIOPATHIC CONSTIPATION: Primary | ICD-10-CM

## 2025-01-28 DIAGNOSIS — R11.0 NAUSEA: ICD-10-CM

## 2025-01-28 DIAGNOSIS — R10.84 GENERALIZED ABDOMINAL PAIN: ICD-10-CM

## 2025-01-28 PROCEDURE — 99214 OFFICE O/P EST MOD 30 MIN: CPT | Performed by: NURSE PRACTITIONER

## 2025-01-28 RX ORDER — SENNOSIDES A AND B 8.6 MG/1
1 TABLET, FILM COATED ORAL DAILY
Qty: 30 TABLET | Refills: 3 | Status: SHIPPED | OUTPATIENT
Start: 2025-01-28 | End: 2025-02-06 | Stop reason: SDUPTHER

## 2025-01-28 NOTE — PROGRESS NOTES
Name: Chinmay Curtis      : 1955      MRN: [id number]  Encounter Provider: PIA Heath  Encounter Date: 2025   Encounter department: [facility]  :  Assessment & Plan  Chronic idiopathic constipation  While the patient was in the office today, I discussed with the patient that at this point time I feel that her biggest issue is the continued chronic constipation and that we need to work on a better bowel regimen with a goal of having a relieving, non-straining bowel movement at least every other day, if not daily. I discussed that our goal would also be that they never go past 2 days without having a bowel movement.     I reassured the patient that taking MiraLAX daily is not only safe and reasonable and can be taken long-term without any long-term consequences.  At this point in time we discussed the following bowel regimen:    Increase MiraLAX to 1 capful daily and can go as high as 1-1/2 capfuls daily if needed according to bowel movements.  Start Senokot every other night until having a relieving bowel movement at least every other day, if not daily.  Continue to try to drink at least 32 to 64 ounces of water/fluid daily without caffeine.  Contact our office with an update in 2 weeks or sooner if you are having any issues or worsening symptoms.  Orders:  •  senna (SENOKOT) 8.6 MG tablet; Take 1 tablet (8.6 mg total) by mouth daily    Generalized abdominal pain  Orders:  •  senna (SENOKOT) 8.6 MG tablet; Take 1 tablet (8.6 mg total) by mouth daily    Nausea  Orders:  •  senna (SENOKOT) 8.6 MG tablet; Take 1 tablet (8.6 mg total) by mouth daily    History of colon polyps  We will hold off on any repeat colonoscopies until the recommended surveillance date unless the patient would start to develop red flag symptoms in the future.  The patient was agreeable and verbalized an understanding.  DUE: 26    Reviewed GI red flag symptoms with patient  today.         Tubular adenoma of colon  See above.       The patient is to schedule a follow up office visit in 3 months, but understands to call or contact our offices with any issues before then or as needed.     History of Present Illness   HPI  Chinmay Curtis is a 69 y.o. female who presents today with her  for a follow-up office visit regarding her chronic idiopathic constipation, generalized abdominal pain, nausea, and history of colon polyps with tubular adenomas.  The patient reports that since her last office visit she did start taking the MiraLAX daily and was making sure that she was drinking more fluid but has been drinking diet ice tea with caffeine as she cannot tolerate just plain water.  The patient reports that initially she was doing so much better with regards to having more regular bowel movements, until approximately 2 months ago.  She reports that she has been taking half to three quarters a capful of MiraLAX daily, but has been trying to decrease it or go without it some days as she heard that taking MiraLAX daily is bad for your colon.    She reports that for the past 2 months or so she has also been having some epigastric pain with nausea, especially if she is having some constipation issues as well as intermittent reflux, again, that definitely worsens when she is constipated.    The patient currently denies any nausea, dysphagia, vomiting, decreased appetite, unplanned weight loss. Water Intake: Minimal to moderate amounts per day.    The patient currently reports that they have a BM every 1-3 days and reports that it is sometimes relieving, without any consistent diarrhea, nocturnal BMs, straining, melena or bloody stools.  Last BM: Today. Flatus: Minimal.    Meds: Miralax 3/4 Miralax daily.  Daily NSAID Use: Denied    Tobacco: Denied  ETOH: Very rarely  Marijuana: Denied  Illicit Drug Use: Denied    Imaging: (9/23/24): CT of the abdomen pelvis with contrast: 1.  Moderate colonic  "stool burden without bowel obstruction.  2.  Indeterminate right renal lesion. Contrast-enhanced MRI of the abdomen is advised for further assessment.    Endoscopy History: EGD: (6/14/23): Normal. Path: Normal     COLONOSCOPY: (6/14/23): 7 tubular adenomas with moderate diverticulosis and significant luminal narrowing with a recommendation of a repeat colonoscopy in 3 years secondary to the personal history of colon polyps and tubular adenomas.     DUE: 6/14/26    History obtained from: patient    Review of Systems       Objective   /90 (Patient Position: Sitting, Cuff Size: Standard)   Pulse 90   Temp 98 °F (36.7 °C)   Resp 18   Ht 5' 2\" (1.575 m)   Wt 74.8 kg (165 lb)   SpO2 98%   BMI 30.18 kg/m²      Physical Exam  Abdomen is softly distended, with mild diffuse tenderness on upon exam, without any significant guarding or rebound tenderness noted, with faint bowel sounds x 4.  No edema noted of the b/l lower extremities upon exam today. Skin is non-icteric.       "

## 2025-01-28 NOTE — ASSESSMENT & PLAN NOTE
While the patient was in the office today, I discussed with the patient that at this point time I feel that her biggest issue is the continued chronic constipation and that we need to work on a better bowel regimen with a goal of having a relieving, non-straining bowel movement at least every other day, if not daily. I discussed that our goal would also be that they never go past 2 days without having a bowel movement.     I reassured the patient that taking MiraLAX daily is not only safe and reasonable and can be taken long-term without any long-term consequences.  At this point in time we discussed the following bowel regimen:    Increase MiraLAX to 1 capful daily and can go as high as 1-1/2 capfuls daily if needed according to bowel movements.  Start Senokot every other night until having a relieving bowel movement at least every other day, if not daily.  Continue to try to drink at least 32 to 64 ounces of water/fluid daily without caffeine.  Contact our office with an update in 2 weeks or sooner if you are having any issues or worsening symptoms.  Orders:  •  senna (SENOKOT) 8.6 MG tablet; Take 1 tablet (8.6 mg total) by mouth daily

## 2025-01-28 NOTE — PATIENT INSTRUCTIONS
Increase Miralax to 1 capful daily and can increase to 1 1/2 capfuls daily.   Continue to try to drink at least 32 to 64 oz of water/fluid without caffeine.   Start Senokot every other night until having relieving bowel movement at least every other day, if not daily.   Contact us in 2 weeks with an update.   Continue to watch for red flag symptoms.   Schedule a f/u OV in 3 months.     We did review GI red flag symptoms, including, but not limited to: chronic nausea, vomiting, diarrhea, chills, fever, and unintentional weight loss and should call or contact our office with any changes or concerns. I reviewed with the patient that if they notice any blood while vomiting or in their stool they should contact or office or go to the nearest emergency room for immediate evaluation. The patient was agreeable and verbalized an understanding.

## 2025-02-06 ENCOUNTER — NURSE TRIAGE (OUTPATIENT)
Age: 70
End: 2025-02-06

## 2025-02-06 DIAGNOSIS — R10.84 GENERALIZED ABDOMINAL PAIN: ICD-10-CM

## 2025-02-06 DIAGNOSIS — R11.0 NAUSEA: ICD-10-CM

## 2025-02-06 DIAGNOSIS — K59.04 CHRONIC IDIOPATHIC CONSTIPATION: ICD-10-CM

## 2025-02-06 RX ORDER — SENNOSIDES A AND B 8.6 MG/1
2 TABLET, FILM COATED ORAL DAILY
Qty: 60 TABLET | Refills: 3 | Status: SHIPPED | OUTPATIENT
Start: 2025-02-06

## 2025-02-06 NOTE — TELEPHONE ENCOUNTER
"Pt. Calling with no relief of constipation despite recommendations given at OV on 1/28/24, having abdominal bloating, pain and back pain, hx of back surgery as well, pt. Has increased Miralax from 1-1.5 capfuls daily, senokot nightly, pt. Feels her constipation has been an ongoing chronic, pt. Reports using Miralax 1 capful daily and has tried 2 capfuls daily in the past with not much relief, pt. 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 Is active but feels her back surgery is causing her bowel issues, pt. Moving bowels daily, having to strain, denies hard stool, denies blood in stool, + flatus, abdominal pain and bloating does not improve after moving bowels, advised to increase Miralax to 1 capful BID, increase water intake pt. Drinking iced tea and lemonade instead of water, pt.  Agreeable to recommendations but feels her constipation is unrelieved despite adding Senokot and Miralax, please advise further         Reason for Disposition   Constipation is a recurrent ongoing problem (i.e., < 3 BMs / week or straining > 25% of the time)    Answer Assessment - Initial Assessment Questions  1. STOOL PATTERN OR FREQUENCY: \"How often do you have a bowel movement (BM)?\"  (Normal range: 3 times a day to every 3 days)  \"When was your last BM?\"        Every day, soft formed stool, small amount   2. STRAINING: \"Do you have to strain to have a BM?\"       Yes   3. ONSET: \"When did the constipation begin?\"      Ongoing, chronic   4. RECTAL PAIN: \"Does your rectum hurt when the stool comes out?\" If Yes, ask: \"Do you have hemorrhoids? How bad is the pain?\"  (Scale 1-10; or mild, moderate, severe)      Denies   5. BM COMPOSITION: \"Are the stools hard?\"       no  6. BLOOD ON STOOLS: \"Has there been any blood on the toilet tissue or on the surface of the BM?\" If Yes, ask: \"When was the last time?\"      Denies   7. CHRONIC CONSTIPATION: \"Is this a new problem for you?\"  If " "No, ask: \"How long have you had this problem?\" (days, weeks, months)       Yes, taking miralax daily   8. CHANGES IN DIET OR HYDRATION: \"Have there been any recent changes in your diet?\" \"How much fluids are you drinking on a daily basis?\"  \"How much have you had to drink today?\"      Denies   9. MEDICINES: \"Have you been taking any new medicines?\" \"Are you taking any narcotic pain medicines?\" (e.g., Dilaudid, morphine, Percocet, Vicodin)      Denies   10. LAXATIVES: \"Have you been using any stool softeners, laxatives, or enemas?\"  If Yes, ask \"What, how often, and when was the last time?\"        Miralax 1 capful daily, added senokot at bedtime   11. ACTIVITY:  \"How much walking do you do every day?\"  \"Has your activity level decreased in the past week?\"         Back surgery   12. CAUSE: \"What do you think is causing the constipation?\"         Back surgery   13. MEDICAL HISTORY: \"Do you have a history of hemorrhoids, rectal fissures, rectal surgery, or rectal abscess?\"          Denies   14. OTHER SYMPTOMS: \"Do you have any other symptoms?\" (e.g., abdomen pain, bloating, fever, vomiting)        Abdominal pain, bloating   15. PREGNANCY: \"Is there any chance you are pregnant?\" \"When was your last menstrual period?\"        Denies    Protocols used: Constipation-Adult-[place]    "

## 2025-02-06 NOTE — TELEPHONE ENCOUNTER
I do not know that an ultrasound would be significantly helpful at this point, but I did put in an order for an abdominal obstruction series x-rays just to make sure there is not any kind of underlying obstruction although I am not highly suspicious because she is having bowel movements, but she could have a partial obstruction and I will also evaluate the overall stool burden or how much she is really constipated.  I put that order in and as soon as I have the results we will contact her.    Otherwise, I still would like her to proceed with the recommendations.

## 2025-02-06 NOTE — TELEPHONE ENCOUNTER
Can you please call the patient and let her know that I agree she should increase the MiraLAX to twice daily dosing and increase her Senokot to 2 pills at bedtime and that it can take as long as another 7 to 10 days of consistent use to start to see improvement especially the gas and the bloating.  She is to continue to try to drink enough fluids as we discussed at her office visit and that I would like her to try this change consistently for at least 1 week and then contact our office with an update and at that time if she does not see improvement we may consider trying Linzess or Amitiza if she does not see any relief or improvement.

## 2025-02-06 NOTE — TELEPHONE ENCOUNTER
I spoke with patient and conveyed provider recommendations. She will comply. She did ask if you felt radiology study (she mentioned ultrasound)  would be warranted at this time in regards to issues? Please advise.

## 2025-02-07 ENCOUNTER — RESULTS FOLLOW-UP (OUTPATIENT)
Dept: GASTROENTEROLOGY | Facility: CLINIC | Age: 70
End: 2025-02-07

## 2025-02-07 ENCOUNTER — HOSPITAL ENCOUNTER (OUTPATIENT)
Dept: RADIOLOGY | Facility: HOSPITAL | Age: 70
Discharge: HOME | End: 2025-02-07
Payer: MEDICARE

## 2025-02-07 DIAGNOSIS — R10.84 GENERALIZED ABDOMINAL PAIN: ICD-10-CM

## 2025-02-07 DIAGNOSIS — K59.04 CHRONIC IDIOPATHIC CONSTIPATION: ICD-10-CM

## 2025-02-07 PROCEDURE — 74022 RADEX COMPL AQT ABD SERIES: CPT

## 2025-05-05 ENCOUNTER — OFFICE VISIT (OUTPATIENT)
Age: 70
End: 2025-05-05
Payer: MEDICARE

## 2025-05-05 VITALS
BODY MASS INDEX: 30.36 KG/M2 | TEMPERATURE: 98.3 F | OXYGEN SATURATION: 96 % | DIASTOLIC BLOOD PRESSURE: 80 MMHG | SYSTOLIC BLOOD PRESSURE: 132 MMHG | HEIGHT: 62 IN | WEIGHT: 165 LBS | RESPIRATION RATE: 17 BRPM | HEART RATE: 83 BPM

## 2025-05-05 DIAGNOSIS — Z86.0100 HISTORY OF COLON POLYPS: ICD-10-CM

## 2025-05-05 DIAGNOSIS — R14.0 BLOATING: ICD-10-CM

## 2025-05-05 DIAGNOSIS — D12.6 TUBULAR ADENOMA OF COLON: ICD-10-CM

## 2025-05-05 DIAGNOSIS — K58.2 IRRITABLE BOWEL SYNDROME WITH BOTH CONSTIPATION AND DIARRHEA: Primary | ICD-10-CM

## 2025-05-05 PROCEDURE — 99214 OFFICE O/P EST MOD 30 MIN: CPT | Performed by: NURSE PRACTITIONER

## 2025-05-05 RX ORDER — POLYETHYLENE GLYCOL 3350 17 G/17G
17 POWDER, FOR SOLUTION ORAL DAILY
Qty: 578 G | Refills: 2 | Status: SHIPPED | OUTPATIENT
Start: 2025-05-05

## 2025-05-05 NOTE — PATIENT INSTRUCTIONS
Stop senokot.   Start Linzess 72 mcg, 1 pill daily at bed time.   Decrease Miralax to once daily in AM.   Continue to drink at least 32-64 oz of water daily.   Consider food diary for the next month and make a list of trigger foods and then consider elimination diet as discussed.   Continue to watch for red flag symptoms.   Schedule a f/u OV 3 months.     We did review GI red flag symptoms, including, but not limited to: chronic nausea, vomiting, diarrhea, chills, fever, and unintentional weight loss and should call or contact our office with any changes or concerns. I reviewed with the patient that if they notice any blood while vomiting or in their stool they should contact or office or go to the nearest emergency room for immediate evaluation. The patient was agreeable and verbalized an understanding.

## 2025-05-05 NOTE — PROGRESS NOTES
Name: Chinmay Curtis      : 1955      MRN: [id number]  Encounter Provider: PIA Heath  Encounter Date: 2025   Encounter department: [facility]  :  Assessment & Plan  Irritable bowel syndrome with both constipation and diarrhea  While the patient and her  were in the office today, I did have a thorough conversation with them regarding her continued and chronic irritable bowel syndrome symptoms with both constipation and again, what sounds like overflow diarrhea.  I explained to the patient that it may be a matter of finding a balance of the food she is eating, activity level, exercise, and weight loss goals.    We discussed the following bowel regimen/treatment plan:  Stop Senokot at bedtime.  Start Linzess 72 mcg, 1 pill daily at bedtime.  Decrease MiraLAX down to just once daily in the AM.  Continue to drink at least 32 to 64 ounces of water daily.  Consider starting a food diary for the next month and then make a list of trigger foods and possibly consider an elimination diet as discussed.  Consider looking at low FODMAP diet foods to try to identify any trigger foods or foods that could be causing more gas and bloating.  Discussed keeping her fiber intake in moderation.    If her symptoms do not improve or would worsen with the above changes, we would discuss the possibility of proceeding with a repeat colonoscopy at her next office visit.  The patient was agreeable and verbalized an understanding.  Orders:  •  linaCLOtide 72 MCG CAPS; Take 1 PO HS.  •  polyethylene glycol (MIRALAX) 17 g packet; Take 17 g by mouth daily    Bloating  Orders:  •  linaCLOtide 72 MCG CAPS; Take 1 PO HS.    History of colon polyps  We will hold off on any repeat colonoscopies until the recommended surveillance date unless the patient would start to develop red flag symptoms in the future.  The patient was agreeable and verbalized an understanding.  DUE:  6/14/26    Reviewed GI red flag symptoms with patient today.         Tubular adenoma of colon  See above.       The patient is to schedule a follow up office visit in 3 months, but understands to call or contact our offices with any issues before then or as needed.     History of Present Illness   Chinmay Curtis is a 69 y.o. female who presents for a follow-up office visit her irritable bowel syndrome with both constipation and overflow diarrhea, bloating, history of colon polyps and tubular adenoma of the colon.  The patient reports that initially when we had started her on the increased dose of Senokot and MiraLAX she was doing much better.  However, over the past few weeks she reports she just always feels as though she is bloated and has a stomachache and goes back and forth between constipation and what sounds like overflow diarrhea. Her most recent abdominal x-rays showed mild to moderate stool burden w/out any evidence of obstruction.  The patient reports that she has been drinking at least 64 ounces of water and switched to decaffeinated Crystal light iced tea as discussed.     The patient currently denies any reflux, nausea, dysphagia, vomiting, decreased appetite, or unplanned weight loss. Water Intake: Adequate amounts per day.    The patient currently reports that they have a BM every 2-3 days and reports that it is sometimes relieving, without any consistent diarrhea, constipation, straining, melena or bloody stools. Last BM: This morning. Flatus: Sometimes.    GI Meds: MiraLAX twice daily and Senokot 2 tabs at bedtime.  Daily NSAID Use: Denied    Imaging: (2/7/25): Abdominal obstruction series x-rays: Mild to moderate stool burden without any evidence of obstruction.    Endoscopy History: EGD: (6/14/23): Normal. Path: Normal     COLONOSCOPY: (6/14/23): 7 tubular adenomas with moderate diverticulosis and significant luminal narrowing with a recommendation of a repeat colonoscopy in 3 years secondary to  "the personal history of colon polyps and tubular adenomas.     DUE: 6/14/26    HPI  History obtained from: patient and patient's Significant Other  Review of Systems A complete review of systems is negative other than that noted above in the HPI.      Current Outpatient Medications   Medication Sig Dispense Refill   • cholecalciferol (VITAMIN D3) 400 units tablet Take 400 Units by mouth daily     • linaCLOtide 72 MCG CAPS Take 1 PO HS. 30 capsule 2   • polyethylene glycol (MIRALAX) 17 g packet Take 17 g by mouth daily 578 g 2   • Potassium 99 MG TABS Take by mouth in the morning     • Probiotic Product (PROBIOTIC PO) Take by mouth daily       No current facility-administered medications for this visit.     Objective   /80 (BP Location: Right arm, Patient Position: Sitting, Cuff Size: Adult)   Pulse 83   Temp 98.3 °F (36.8 °C) (Temporal)   Resp 17   Ht 5' 2\" (1.575 m)   Wt 74.8 kg (165 lb)   SpO2 96%   BMI 30.18 kg/m²     Physical Exam   Abdomen is soft and distended, nontender, with hypoactive bowel sounds x 4.  No edema noted of the b/l lower extremities upon exam today. Skin is non-icteric.     Lab Results: I personally reviewed relevant lab results.       Results for orders placed during the hospital encounter of 06/14/23    EGD    Narrative  Table formatting from the original result was not included.  [facility]  [address] [facility] [address]  [phone]      DATE OF SERVICE:  6/14/23    PHYSICIAN(S):  Attending:  Diana M Jaiyeola, MD    Fellow:  No Staff Documented      INDICATION:  Nausea, Chronic idiopathic constipation, Gastroesophageal reflux disease without esophagitis    POST-OP DIAGNOSIS:  See the impression below.    PREPROCEDURE:  Informed consent was obtained for the procedure, including sedation.  Risks of perforation, hemorrhage, adverse drug reaction and aspiration were discussed. The patient was placed in the left lateral decubitus " position.    Patient was explained about the risks and benefits of the procedure. Risks including but not limited to bleeding, infection, and perforation were explained in detail. Also explained about less than 100% sensitivity with the exam and other alternatives.    PROCEDURE: EGD    DETAILS OF PROCEDURE:  Patient was taken to the procedure room where a time out was performed to confirm correct patient and correct procedure. The patient underwent monitored anesthesia care, which was administered by an anesthesia professional. The patient's blood pressure, heart rate, level of consciousness, respirations and oxygen were monitored throughout the procedure. The scope was advanced to the second part of the duodenum. Retroflexion was performed in the fundus. The patient experienced no blood loss. The procedure was not difficult. The patient tolerated the procedure well. There were no apparent adverse events.    ANESTHESIA INFORMATION:  ASA: II  Anesthesia Type: IV Sedation with Anesthesia    MEDICATIONS:  No administrations occurring from 1020 to 1043 on 06/14/23      FINDINGS:  Regular Z-line 34 cm from the incisors  The esophagus appeared normal.  The stomach appeared normal. Performed random biopsy using biopsy forceps.  The duodenum appeared normal.      SPECIMENS:  ID Type Source Tests Collected by Time Destination  1 : cold fcp bx of antrum incisura for intestional metaplasia Tissue Stomach TISSUE EXAM Diana M Jaiyeola, MD 6/14/2023 10:39 AM  2 : cold fcp bx of stomach body for intestional metaplasia Tissue Stomach TISSUE EXAM Diana M Jaiyeola, MD 6/14/2023 10:41 AM  3 : cold snare polyp x 5 Tissue Large Intestine, Transverse Colon TISSUE EXAM Diana M Jaiyeola, MD 6/14/2023 10:58 AM  4 : cold snare polyp x 1 Tissue Large Intestine, Sigmoid Colon TISSUE EXAM Diana M Jaiyeola, MD 6/14/2023 11:07 AM  5 : cold snare polyp x1 Tissue Rectum TISSUE EXAM Diana M Jaiyeola, MD 6/14/2023 11:08 AM          Impression  The  esophagus appeared normal.  The stomach appeared normal. Performed random mapping biopsies for history of intestinal metaplasia.  The duodenum appeared normal.      RECOMMENDATION:  Await pathology results    If pathology is consistent with gastric intestinal metaplasia repeat EGD in 3 years for surveillance  Proceed with colonoscopy          Diana M Jaiyeola, MD

## 2025-05-05 NOTE — ASSESSMENT & PLAN NOTE
While the patient and her  were in the office today, I did have a thorough conversation with them regarding her continued and chronic irritable bowel syndrome symptoms with both constipation and again, what sounds like overflow diarrhea.  I explained to the patient that it may be a matter of finding a balance of the food she is eating, activity level, exercise, and weight loss goals.    We discussed the following bowel regimen/treatment plan:  Stop Senokot at bedtime.  Start Linzess 72 mcg, 1 pill daily at bedtime.  Decrease MiraLAX down to just once daily in the AM.  Continue to drink at least 32 to 64 ounces of water daily.  Consider starting a food diary for the next month and then make a list of trigger foods and possibly consider an elimination diet as discussed.  Consider looking at low FODMAP diet foods to try to identify any trigger foods or foods that could be causing more gas and bloating.  Discussed keeping her fiber intake in moderation.    If her symptoms do not improve or would worsen with the above changes, we would discuss the possibility of proceeding with a repeat colonoscopy at her next office visit.  The patient was agreeable and verbalized an understanding.  Orders:  •  linaCLOtide 72 MCG CAPS; Take 1 PO HS.  •  polyethylene glycol (MIRALAX) 17 g packet; Take 17 g by mouth daily

## 2025-06-03 ENCOUNTER — APPOINTMENT (EMERGENCY)
Dept: RADIOLOGY | Facility: HOSPITAL | Age: 70
End: 2025-06-03
Payer: MEDICARE

## 2025-06-03 ENCOUNTER — HOSPITAL ENCOUNTER (EMERGENCY)
Facility: HOSPITAL | Age: 70
Discharge: HOME | End: 2025-06-03
Payer: MEDICARE

## 2025-06-03 VITALS
WEIGHT: 167.99 LBS | OXYGEN SATURATION: 97 % | DIASTOLIC BLOOD PRESSURE: 71 MMHG | HEART RATE: 62 BPM | BODY MASS INDEX: 30.73 KG/M2 | RESPIRATION RATE: 17 BRPM | TEMPERATURE: 97.3 F | SYSTOLIC BLOOD PRESSURE: 162 MMHG

## 2025-06-03 DIAGNOSIS — R07.9 CHEST PAIN: ICD-10-CM

## 2025-06-03 DIAGNOSIS — R03.0 ELEVATED BP WITHOUT DIAGNOSIS OF HYPERTENSION: Primary | ICD-10-CM

## 2025-06-03 LAB
2HR DELTA HS TROPONIN: -1 NG/L
ALBUMIN SERPL BCG-MCNC: 4.3 G/DL (ref 3.5–5)
ALP SERPL-CCNC: 84 U/L (ref 34–104)
ALT SERPL W P-5'-P-CCNC: 23 U/L (ref 7–52)
ANION GAP SERPL CALCULATED.3IONS-SCNC: 7 MMOL/L (ref 4–13)
AST SERPL W P-5'-P-CCNC: 22 U/L (ref 13–39)
ATRIAL RATE: 61 BPM
ATRIAL RATE: 72 BPM
BASOPHILS # BLD AUTO: 0.11 THOUSANDS/ÂΜL (ref 0–0.1)
BASOPHILS NFR BLD AUTO: 1 % (ref 0–1)
BILIRUB SERPL-MCNC: 0.38 MG/DL (ref 0.2–1)
BUN SERPL-MCNC: 14 MG/DL (ref 5–25)
CALCIUM SERPL-MCNC: 9.5 MG/DL (ref 8.4–10.2)
CARDIAC TROPONIN I PNL SERPL HS: 4 NG/L (ref ?–50)
CARDIAC TROPONIN I PNL SERPL HS: 5 NG/L (ref ?–50)
CHLORIDE SERPL-SCNC: 105 MMOL/L (ref 96–108)
CO2 SERPL-SCNC: 28 MMOL/L (ref 21–32)
CREAT SERPL-MCNC: 0.81 MG/DL (ref 0.6–1.3)
EOSINOPHIL # BLD AUTO: 0.44 THOUSAND/ÂΜL (ref 0–0.61)
EOSINOPHIL NFR BLD AUTO: 6 % (ref 0–6)
ERYTHROCYTE [DISTWIDTH] IN BLOOD BY AUTOMATED COUNT: 11.8 % (ref 11.6–15.1)
GFR SERPL CREATININE-BSD FRML MDRD: 73 ML/MIN/1.73SQ M
GLUCOSE SERPL-MCNC: 107 MG/DL (ref 65–140)
HCT VFR BLD AUTO: 48.3 % (ref 34.8–46.1)
HGB BLD-MCNC: 16 G/DL (ref 11.5–15.4)
IMM GRANULOCYTES # BLD AUTO: 0.02 THOUSAND/UL (ref 0–0.2)
IMM GRANULOCYTES NFR BLD AUTO: 0 % (ref 0–2)
LIPASE SERPL-CCNC: 20 U/L (ref 11–82)
LYMPHOCYTES # BLD AUTO: 3.44 THOUSANDS/ÂΜL (ref 0.6–4.47)
LYMPHOCYTES NFR BLD AUTO: 46 % (ref 14–44)
MCH RBC QN AUTO: 31.1 PG (ref 26.8–34.3)
MCHC RBC AUTO-ENTMCNC: 33.1 G/DL (ref 31.4–37.4)
MCV RBC AUTO: 94 FL (ref 82–98)
MONOCYTES # BLD AUTO: 0.84 THOUSAND/ÂΜL (ref 0.17–1.22)
MONOCYTES NFR BLD AUTO: 11 % (ref 4–12)
NEUTROPHILS # BLD AUTO: 2.75 THOUSANDS/ÂΜL (ref 1.85–7.62)
NEUTS SEG NFR BLD AUTO: 36 % (ref 43–75)
NRBC BLD AUTO-RTO: 0 /100 WBCS
P AXIS: 14 DEGREES
P AXIS: 31 DEGREES
PLATELET # BLD AUTO: 278 THOUSANDS/UL (ref 149–390)
PMV BLD AUTO: 9.7 FL (ref 8.9–12.7)
POTASSIUM SERPL-SCNC: 4.2 MMOL/L (ref 3.5–5.3)
PR INTERVAL: 168 MS
PR INTERVAL: 172 MS
PROT SERPL-MCNC: 7.5 G/DL (ref 6.4–8.4)
QRS AXIS: 43 DEGREES
QRS AXIS: 61 DEGREES
QRSD INTERVAL: 72 MS
QRSD INTERVAL: 74 MS
QT INTERVAL: 388 MS
QT INTERVAL: 402 MS
QTC INTERVAL: 404 MS
QTC INTERVAL: 424 MS
RBC # BLD AUTO: 5.14 MILLION/UL (ref 3.81–5.12)
SODIUM SERPL-SCNC: 140 MMOL/L (ref 135–147)
T WAVE AXIS: 41 DEGREES
T WAVE AXIS: 52 DEGREES
VENTRICULAR RATE: 61 BPM
VENTRICULAR RATE: 72 BPM
WBC # BLD AUTO: 7.6 THOUSAND/UL (ref 4.31–10.16)

## 2025-06-03 PROCEDURE — 71045 X-RAY EXAM CHEST 1 VIEW: CPT

## 2025-06-03 PROCEDURE — 84484 ASSAY OF TROPONIN QUANT: CPT

## 2025-06-03 PROCEDURE — 99285 EMERGENCY DEPT VISIT HI MDM: CPT

## 2025-06-03 PROCEDURE — 85025 COMPLETE CBC W/AUTO DIFF WBC: CPT

## 2025-06-03 PROCEDURE — 83690 ASSAY OF LIPASE: CPT

## 2025-06-03 PROCEDURE — 93005 ELECTROCARDIOGRAM TRACING: CPT

## 2025-06-03 PROCEDURE — 36415 COLL VENOUS BLD VENIPUNCTURE: CPT

## 2025-06-03 PROCEDURE — 80053 COMPREHEN METABOLIC PANEL: CPT

## 2025-06-03 PROCEDURE — 93010 ELECTROCARDIOGRAM REPORT: CPT | Performed by: INTERNAL MEDICINE

## 2025-06-03 RX ORDER — SODIUM CHLORIDE 9 MG/ML
3 INJECTION INTRAVENOUS
Status: DISCONTINUED | OUTPATIENT
Start: 2025-06-03 | End: 2025-06-03 | Stop reason: HOSPADM

## 2025-06-03 RX ORDER — ASPIRIN 81 MG/1
324 TABLET, CHEWABLE ORAL ONCE
Status: COMPLETED | OUTPATIENT
Start: 2025-06-03 | End: 2025-06-03

## 2025-06-03 RX ADMIN — ASPIRIN 324 MG: 81 TABLET, CHEWABLE ORAL at 06:02

## 2025-06-03 NOTE — DISCHARGE INSTRUCTIONS
You should followup with cardiology to rule out other cardiac causes of your chest pain today.    You should also see your primary doctor to followup and have your BP re-checked.    If you have a worsening of your symptoms then please come back the ED.

## 2025-06-03 NOTE — ED CARE HANDOFF
Emergency Department Sign Out Note        Sign out and transfer of care from Dr. Hawkins. See Separate Emergency Department note.     The patient, Chinmay Curtis, was evaluated by the previous provider for chest pain.    Workup Completed:  ECG  CBC, CMP, initial HS troponin    ED Course / Workup Pending (followup):  Repeat troponin.      HEART Risk Score      Flowsheet Row Most Recent Value   Heart Score Risk Calculator    History 1 Filed at: 06/03/2025 0639   ECG 0 Filed at: 06/03/2025 0639   Age 2 Filed at: 06/03/2025 0639   Risk Factors 1 Filed at: 06/03/2025 0639   Troponin 0 Filed at: 06/03/2025 0639   HEART Score 4 Filed at: 06/03/2025 0639          (ISAR) Identification of Seniors at Risk  In the last 24 hours, have you needed more help than usual?: 0  Have you been hospitalized for one or more nights during the past 6 months?: 0  In general, do you see well?: 0  In general, do you have serious problems with your memory?: 0  Do you take more than three different medications every day?: 0  ISAR Score: 0                              ED Course as of 06/03/25 0815   Tue Jun 03, 2025   0654 Received sign out from Dr. Hawkins.  70 yof with hx of HLD, not on medications, presented with substernal to left of substernal chest pain accompanied by nausea, onset at 0500, mostly resolved at this time however is aggravated by arm movements.  Had intermittent chest pain yesterday while doing yard work as well without accompanying symptoms.  ECG non-ischemic and is without evidence of arrhythmia, ectopy, or abnormal interval.  Initial trop 5, heart score 4.  Likely MSK chest pain, however will obtain delta troponin.  If delta troponin normal, would be appropriate for discharge with cardiology referral and strict return precautions.    0746 Repeat ECG right 61, no ST segment elevations or depressions, no abnormal intervals, no evidence of ectopy or arrhythmia.  No changes as compared to prior from earlier today.  Normal ECG.    0814 Delta 2hr hsTnI: -1   0814 Is appropriate for discharge with PCP follow-up for elevated blood pressure and cardiology follow-up for chest pain.  Strict return precautions and supportive care measures discussed prior to discharge.     Procedures  Medical Decision Making  See ED course.    Amount and/or Complexity of Data Reviewed  Labs: ordered. Decision-making details documented in ED Course.  Radiology: ordered and independent interpretation performed.    Risk  OTC drugs.  Prescription drug management.            Disposition  Final diagnoses:   Elevated BP without diagnosis of hypertension   Chest pain     Time reflects when diagnosis was documented in both MDM as applicable and the Disposition within this note       Time User Action Codes Description Comment    6/3/2025  6:39 AM Thanh Hawkins Add [R03.0] Elevated BP without diagnosis of hypertension     6/3/2025  6:39 AM Thanh Hawkins Add [R07.9] Chest pain           ED Disposition       ED Disposition   Discharge    Condition   Stable    Date/Time   Tue Rickie 3, 2025 0814    Comment   Chinmay Curtis discharge to home/self care.                   Follow-up Information       Follow up With Specialties Details Why Contact Info Additional Information    Olaf Maldonado,  Family Medicine Schedule an appointment as soon as possible for a visit   [address] [place] [address] [id number] [phone]       Replaced by [facility] Emergency Medicine Go to  If symptoms worsen, As needed [address] [facility] [id number] [phone] Replaced by [facility] Emergency Department, [address], [place], [address]    [facility]   [address] [place] [id number] [phone] [facility], [address] [place] [address], [phone]          Patient's Medications   Discharge  Prescriptions    No medications on file            ED Provider  Electronically Signed by     Heather Sebastian DO  06/03/25 0854

## 2025-06-03 NOTE — ED PROVIDER NOTES
Time reflects when diagnosis was documented in both MDM as applicable and the Disposition within this note       Time User Action Codes Description Comment    6/3/2025  6:39 AM Thanh Hawkins Add [R03.0] Elevated BP without diagnosis of hypertension     6/3/2025  6:39 AM Thanh Hawkins [R07.9] Chest pain           ED Disposition       ED Disposition   Discharge    Condition   Stable    Date/Time   Tue Rickie 3, 2025  8:14 AM    Comment   Chinmay TITO Curtis discharge to home/self care.                   Assessment & Plan       Medical Decision Making  Medical complexity: 70-year-old female presents to the emergency department today with chest pain that was present when she woke from sleep.  Nonexertional, nonpositional.  Not reproducible.  Still present at this time.  Her story is suspicious for possible ACS/MI.  Her initial EKG however demonstrates a normal sinus rhythm with normal morphology without acute ST changes.  She is notably hypertensive into the 170s systolics on presentation.  She is still endorsing some nausea as well.  Alternative pathologies that can cause similar symptoms include pericarditis and myocarditis, pleural effusions, focal infiltrate such as pneumonia, abdominal pathology such as pancreatitis, hepatobiliary disease.  Workup to include CMP, lipase, and serum troponin biomarkers with continuous cardiac monitoring and serial EKGs.  Patient will be loaded with ASA as she continues to have chest pain here in the emergency department.    Reassessment/disposition: Thankfully, delta troponin was negative.  Patient's chest pain is likely musculoskeletal in nature though given her risk factors for cardiac disease and the unclear nature of the etiology of her symptoms, would have her follow-up with cardiology team and come back to the emergency department immediately if she experiences new or different chest pain of any kind.  Patient was discharged in no acute distress.    Amount and/or Complexity of  Data Reviewed  Labs: ordered. Decision-making details documented in ED Course.  Radiology: ordered and independent interpretation performed.    Risk  OTC drugs.  Prescription drug management.        ED Course as of 06/06/25 2201 Tue Jun 03, 2025 0602 ECG interpreted by myself.  Date: 6/3/2025.  Time: 0539.  Rate: 72 beats per end.  Axis: Normal.  Rhythm: Regular.  There are no ST elevations or depressions evident on this ECG.  There are no T waves which are inverted or significantly flattened.  There are no pathologic Q waves present on his ECG.  Interpretation: This is a normal ECG with normal sinus rhythm.   0612 hs TnI 0hr: 5  Needs delta at 2hr         Medications   aspirin chewable tablet 324 mg (324 mg Oral Given 6/3/25 0602)       ED Risk Strat Scores   HEART Risk Score      Flowsheet Row Most Recent Value   Heart Score Risk Calculator    History 1 Filed at: 06/03/2025 0639   ECG 0 Filed at: 06/03/2025 0639   Age 2 Filed at: 06/03/2025 0639   Risk Factors 1 Filed at: 06/03/2025 0639   Troponin 0 Filed at: 06/03/2025 0639   HEART Score 4 Filed at: 06/03/2025 0639          HEART Risk Score      Flowsheet Row Most Recent Value   Heart Score Risk Calculator    History 1 Filed at: 06/03/2025 0639   ECG 0 Filed at: 06/03/2025 0639   Age 2 Filed at: 06/03/2025 0639   Risk Factors 1 Filed at: 06/03/2025 0639   Troponin 0 Filed at: 06/03/2025 0639   HEART Score 4 Filed at: 06/03/2025 0639                      No data recorded          SBIRT 20yo+      Flowsheet Row Most Recent Value   Initial Alcohol Screen: US AUDIT-C     1. How often do you have a drink containing alcohol? 0 Filed at: 06/03/2025 0735   2. How many drinks containing alcohol do you have on a typical day you are drinking?  0 Filed at: 06/03/2025 0735   3a. Male UNDER 65: How often do you have five or more drinks on one occasion? 0 Filed at: 06/03/2025 0735   3b. FEMALE Any Age, or MALE 65+: How often do you have 4 or more drinks on one occassion? 0  "Filed at: 06/03/2025 0735   Audit-C Score 0 Filed at: 06/03/2025 0735   LISBET: How many times in the past year have you...    Used an illegal drug or used a prescription medication for non-medical reasons? Never Filed at: 06/03/2025 0735                            History of Present Illness       Chief Complaint   Patient presents with    Chest Pain     Pt awoke from sleep with mid chest and left arm chest pain       Past Medical History[1]   Past Surgical History[2]   Family History[3]   Social History[4]   E-Cigarette/Vaping    E-Cigarette Use Never User       E-Cigarette/Vaping Substances      I have reviewed and agree with the history as documented.     This is a 70-year-old female with a past history of dyslipidemia according to limited chart review and elevated BP readings in the past without dx of HTN.  She presents to the emergency department today with chest pain that was present when she woke from sleep around 5 AM.  Patient states that yesterday she was working outside working with a shovel and says that throughout the day she just felt \"off\" but cannot describe specific symptoms.  She does state that intermittently throughout the day yesterday she was noticing a very mild chest discomfort that would come and go.  Otherwise she has been in her completely normal state of health recently.  This morning when she woke up around 5 AM she noticed an 8 out of 10 in severity pain that she localizes to the left of the lower substernal area with radiation up to her left shoulder and into her left shoulder blade area.  She says that this was associated with a feeling of nausea.  She denies any diaphoresis or shortness of breath.  The patient denies any positional component to her pain or exertional component of her pain.  She denies any prior known issues with her heart.  She does not take any medications daily for any reason.        Review of Systems   Constitutional:  Negative for chills and fever.   HENT:  " Negative for ear pain and sore throat.    Eyes:  Negative for pain and visual disturbance.   Respiratory:  Negative for cough and shortness of breath.    Cardiovascular:  Positive for chest pain. Negative for palpitations.   Gastrointestinal:  Positive for nausea. Negative for abdominal pain and vomiting.   Genitourinary:  Negative for dysuria and hematuria.   Musculoskeletal:  Negative for arthralgias and back pain.   Skin:  Negative for color change and rash.   Neurological:  Negative for seizures and syncope.   All other systems reviewed and are negative.          Objective       ED Triage Vitals [06/03/25 0532]   Temperature Pulse Blood Pressure Respirations SpO2 Patient Position - Orthostatic VS   (!) 97.3 °F (36.3 °C) 76 (!) 175/83 18 98 % Sitting      Temp Source Heart Rate Source BP Location FiO2 (%) Pain Score    Temporal Monitor Left arm -- 8      Vitals      Date and Time Temp Pulse SpO2 Resp BP Pain Score FACES Pain Rating User   06/03/25 0845 97.3 °F (36.3 °C) 62 97 % 17 162/71 -- -- CB   06/03/25 0730 -- 62 97 % 17 162/71 -- -- CB   06/03/25 0645 -- 61 94 % 19 175/83 -- -- CB   06/03/25 0532 97.3 °F (36.3 °C) 76 98 % 18 175/83 8 -- KTR            Physical Exam  Vitals and nursing note reviewed.   Constitutional:       General: She is not in acute distress.     Appearance: She is well-developed. She is obese.   HENT:      Head: Normocephalic and atraumatic.      Right Ear: External ear normal.      Left Ear: External ear normal.      Nose: Nose normal. No congestion or rhinorrhea.      Mouth/Throat:      Mouth: Mucous membranes are moist.      Pharynx: Oropharynx is clear. No oropharyngeal exudate or posterior oropharyngeal erythema.     Eyes:      General: No scleral icterus.     Extraocular Movements: Extraocular movements intact.      Conjunctiva/sclera: Conjunctivae normal.      Pupils: Pupils are equal, round, and reactive to light.       Cardiovascular:      Rate and Rhythm: Normal rate and  regular rhythm.      Pulses: Normal pulses.      Heart sounds: Normal heart sounds. No murmur heard.  Pulmonary:      Effort: Pulmonary effort is normal. No respiratory distress.      Breath sounds: Normal breath sounds. No wheezing or rhonchi.   Abdominal:      General: Abdomen is flat. There is no distension.      Palpations: Abdomen is soft.      Tenderness: There is no abdominal tenderness. There is no guarding.     Musculoskeletal:         General: No swelling.      Cervical back: Neck supple. No rigidity.      Right lower leg: No edema.      Left lower leg: No edema.   Lymphadenopathy:      Cervical: No cervical adenopathy.     Skin:     General: Skin is warm and dry.      Capillary Refill: Capillary refill takes less than 2 seconds.      Coloration: Skin is not jaundiced.      Findings: No rash.     Neurological:      General: No focal deficit present.      Mental Status: She is alert and oriented to person, place, and time. Mental status is at baseline.     Psychiatric:         Mood and Affect: Mood normal.         Behavior: Behavior normal.         Results Reviewed       Procedure Component Value Units Date/Time    HS Troponin I 2hr [[id number]]  (Normal) Collected: 06/03/25 0743    Lab Status: Final result Specimen: Blood from Arm, Right Updated: 06/03/25 0812     hs TnI 2hr 4 ng/L      Delta 2hr hsTnI -1 ng/L     HS Troponin 0hr (reflex protocol) [[id number]]  (Normal) Collected: 06/03/25 0540    Lab Status: Final result Specimen: Blood from Arm, Right Updated: 06/03/25 0611     hs TnI 0hr 5 ng/L     Comprehensive metabolic panel [[id number]] Collected: 06/03/25 0540    Lab Status: Final result Specimen: Blood from Arm, Right Updated: 06/03/25 0604     Sodium 140 mmol/L      Potassium 4.2 mmol/L      Chloride 105 mmol/L      CO2 28 mmol/L      ANION GAP 7 mmol/L      BUN 14 mg/dL      Creatinine 0.81 mg/dL      Glucose 107 mg/dL      Calcium 9.5 mg/dL      AST 22 U/L      ALT 23 U/L      Alkaline Phosphatase  84 U/L      Total Protein 7.5 g/dL      Albumin 4.3 g/dL      Total Bilirubin 0.38 mg/dL      eGFR 73 ml/min/1.73sq m     Narrative:      [employer] guidelines for Chronic Kidney Disease (CKD):     Stage 1 with normal or high GFR (GFR > 90 mL/min/1.73 square meters)    Stage 2 Mild CKD (GFR = 60-89 mL/min/1.73 square meters)    Stage 3A Moderate CKD (GFR = 45-59 mL/min/1.73 square meters)    Stage 3B Moderate CKD (GFR = 30-44 mL/min/1.73 square meters)    Stage 4 Severe CKD (GFR = 15-29 mL/min/1.73 square meters)    Stage 5 End Stage CKD (GFR <15 mL/min/1.73 square meters)  Note: GFR calculation is accurate only with a steady state creatinine    Lipase [[id number]]  (Normal) Collected: 06/03/25 0540    Lab Status: Final result Specimen: Blood from Arm, Right Updated: 06/03/25 0604     Lipase 20 u/L     CBC and differential [[id number]]  (Abnormal) Collected: 06/03/25 0540    Lab Status: Final result Specimen: Blood from Arm, Right Updated: 06/03/25 0549     WBC 7.60 Thousand/uL      RBC 5.14 Million/uL      Hemoglobin 16.0 g/dL      Hematocrit 48.3 %      MCV 94 fL      MCH 31.1 pg      MCHC 33.1 g/dL      RDW 11.8 %      MPV 9.7 fL      Platelets 278 Thousands/uL      nRBC 0 /100 WBCs      Segmented % 36 %      Immature Grans % 0 %      Lymphocytes % 46 %      Monocytes % 11 %      Eosinophils Relative 6 %      Basophils Relative 1 %      Absolute Neutrophils 2.75 Thousands/µL      Absolute Immature Grans 0.02 Thousand/uL      Absolute Lymphocytes 3.44 Thousands/µL      Absolute Monocytes 0.84 Thousand/µL      Eosinophils Absolute 0.44 Thousand/µL      Basophils Absolute 0.11 Thousands/µL             X-ray chest 1 view portable   ED Interpretation by Thanh Hawkins MD (06/03 0604)   No focal infiltrate, no pneumothorax, no pleural effusion      Final Interpretation by Yo Crenshaw MD (06/03 1204)      No acute cardiopulmonary disease.            Resident: Nicola Patterson I, amanda  attending radiologist, have reviewed the images and agree with the final report above.      Workstation performed: [id number]             Procedures    ED Medication and Procedure Management   Prior to Admission Medications   Prescriptions Last Dose Informant Patient Reported? Taking?   Potassium 99 MG TABS 6/2/2025  Yes Yes   Sig: Take by mouth in the morning   Probiotic Product (PROBIOTIC PO) Past Week  Yes Yes   Sig: Take by mouth in the morning.   cholecalciferol (VITAMIN D3) 400 units tablet 6/2/2025  Yes Yes   Sig: Take 400 Units by mouth in the morning.   linaCLOtide 72 MCG CAPS More than a month  No No   Sig: Take 1 PO HS.   polyethylene glycol (MIRALAX) 17 g packet More than a month  No No   Sig: Take 17 g by mouth daily      Facility-Administered Medications: None     Discharge Medication List as of 6/3/2025 12:11 PM        CONTINUE these medications which have NOT CHANGED    Details   cholecalciferol (VITAMIN D3) 400 units tablet Take 400 Units by mouth in the morning., Historical Med      Potassium 99 MG TABS Take by mouth in the morning, Historical Med      Probiotic Product (PROBIOTIC PO) Take by mouth in the morning., Historical Med      linaCLOtide 72 MCG CAPS Take 1 PO HS., Normal      polyethylene glycol (MIRALAX) 17 g packet Take 17 g by mouth daily, Starting Mon 5/5/2025, Normal             ED SEPSIS DOCUMENTATION   Time reflects when diagnosis was documented in both MDM as applicable and the Disposition within this note       Time User Action Codes Description Comment    6/3/2025  6:39 AM Thanh Hawkins Add [R03.0] Elevated BP without diagnosis of hypertension     6/3/2025  6:39 AM Thanh Hawkins Add [R07.9] Chest pain                    [1]   Past Medical History:  Diagnosis Date    Bell's palsy     Colon polyp     Hypertension     Kidney calculi     Spinal stenosis    [2]   Past Surgical History:  Procedure Laterality Date    CHOLECYSTECTOMY      COLONOSCOPY      ENDOMETRIAL ABLATION       LAMINECTOMY  06/14/2019    Lumbar spine - With placement of Trial device nonfusion device   [3]   Family History  Problem Relation Name Age of Onset    Dementia Mother      Heart disease Mother      Emphysema Father      No Known Problems Daughter      No Known Problems Maternal Grandmother      No Known Problems Maternal Grandfather      No Known Problems Paternal Grandmother      No Known Problems Paternal Grandfather      Colon cancer Maternal Uncle      No Known Problems Maternal Uncle      No Known Problems Maternal Uncle      No Known Problems Maternal Uncle      No Known Problems Maternal Uncle      No Known Problems Son      No Known Problems Brother     [4]   Social History  Tobacco Use    Smoking status: Never    Smokeless tobacco: Never   Vaping Use    Vaping status: Never Used   Substance Use Topics    Alcohol use: Never     Comment: occasionally    Drug use: Never        Thanh Hawkins MD  06/06/25 7399

## 2025-08-06 ENCOUNTER — CONSULT (OUTPATIENT)
Dept: CARDIOLOGY CLINIC | Facility: CLINIC | Age: 70
End: 2025-08-06
Payer: MEDICARE

## 2025-08-06 VITALS
DIASTOLIC BLOOD PRESSURE: 80 MMHG | RESPIRATION RATE: 18 BRPM | HEART RATE: 80 BPM | OXYGEN SATURATION: 98 % | WEIGHT: 165 LBS | BODY MASS INDEX: 30.36 KG/M2 | HEIGHT: 62 IN | SYSTOLIC BLOOD PRESSURE: 138 MMHG

## 2025-08-06 DIAGNOSIS — R07.9 CHEST PAIN: ICD-10-CM

## 2025-08-06 PROCEDURE — 99204 OFFICE O/P NEW MOD 45 MIN: CPT | Performed by: INTERNAL MEDICINE

## 2025-08-06 RX ORDER — SENNOSIDES 8.6 MG
8.6 TABLET ORAL
COMMUNITY

## 2025-08-13 ENCOUNTER — OFFICE VISIT (OUTPATIENT)
Age: 70
End: 2025-08-13
Payer: MEDICARE

## 2025-08-13 PROBLEM — R10.13 EPIGASTRIC PAIN: Status: ACTIVE | Noted: 2025-08-13

## 2025-08-13 PROBLEM — Z79.1 NSAID LONG-TERM USE: Status: ACTIVE | Noted: 2025-08-13
